# Patient Record
Sex: FEMALE | Race: WHITE | NOT HISPANIC OR LATINO | Employment: OTHER | URBAN - METROPOLITAN AREA
[De-identification: names, ages, dates, MRNs, and addresses within clinical notes are randomized per-mention and may not be internally consistent; named-entity substitution may affect disease eponyms.]

---

## 2019-03-08 ENCOUNTER — OFFICE VISIT (OUTPATIENT)
Dept: URGENT CARE | Facility: CLINIC | Age: 84
End: 2019-03-08
Payer: MEDICARE

## 2019-03-08 VITALS
HEART RATE: 78 BPM | TEMPERATURE: 97 F | OXYGEN SATURATION: 98 % | WEIGHT: 182 LBS | BODY MASS INDEX: 32.25 KG/M2 | HEIGHT: 63 IN | SYSTOLIC BLOOD PRESSURE: 140 MMHG | DIASTOLIC BLOOD PRESSURE: 85 MMHG

## 2019-03-08 DIAGNOSIS — S69.91XA HAND INJURY, RIGHT, INITIAL ENCOUNTER: Primary | ICD-10-CM

## 2019-03-08 PROCEDURE — 99203 OFFICE O/P NEW LOW 30 MIN: CPT | Mod: S$GLB,,, | Performed by: NURSE PRACTITIONER

## 2019-03-08 PROCEDURE — 73130 X-RAY EXAM OF HAND: CPT | Mod: FY,RT,S$GLB, | Performed by: RADIOLOGY

## 2019-03-08 PROCEDURE — 99203 PR OFFICE/OUTPT VISIT, NEW, LEVL III, 30-44 MIN: ICD-10-PCS | Mod: S$GLB,,, | Performed by: NURSE PRACTITIONER

## 2019-03-08 PROCEDURE — 73130 XR HAND COMPLETE 3 VIEW RIGHT: ICD-10-PCS | Mod: FY,RT,S$GLB, | Performed by: RADIOLOGY

## 2019-03-08 NOTE — PATIENT INSTRUCTIONS
Hand Contusion  You have a contusion. This is also called a bruise. There is swelling and some bleeding under the skin, but no broken bones. This injury generally takes a few days to a few weeks to heal.  During that time, the bruise will typically change in color from reddish, to purple-blue, to greenish-yellow, then to yellow-brown.  Home care  · Elevate the hand to reduce pain and swelling. As much as possible, sit or lie down with the hand raised about the level of your heart. This is especially important during the first 48 hours.  · Ice the hand to help reduce pain and swelling. Wrap a cold source (ice pack or ice cubes in a plastic bag) in a thin towel. Apply to the bruised area for 20 minutes every 1 to 2 hours the first day. Continue this 3 to 4 times a day until the pain and swelling goes away.  · Unless another medicine was prescribed, you can take acetaminophen, ibuprofen, or naproxen to control pain. (If you have chronic liver or kidney disease or ever had a stomach ulcer or gastrointestinal bleeding, talk with your doctor before using these medicines.)  Follow up  Follow up with your healthcare provider or our staff as advised. Call if you are not improving within 1 to 2 weeks.  When to seek medical advice   Call your healthcare provider right away if you have any of the following:  · Increased pain or swelling  · Arm becomes cold, blue, numb or tingly  · Signs of infection: Warmth, drainage, or increased redness or pain around the bruise  · Inability to move the injured hand   · Frequent bruising for unknown reasons  Date Last Reviewed: 2/1/2017 © 2000-2017 [employer]. [address] [place] [address], [place] [address]. All rights reserved. This information is not intended as a substitute for professional medical care. Always follow your healthcare professional's instructions.      Closed Hand Fracture (Adult)  You have a fracture, or broken bone, in your hand. This may be a small crack  or chip in the bone. Or it may be a major break with the broken parts pushed out of place. A closed fracture means that the broken bone has not gone through the skin. A hand fracture is treated with a splint or cast. It usually takes 4 to 6 weeks to heal. Severe injuries may require surgery.     Home care  · Keep your arm elevated to reduce pain and swelling. When sitting or lying down, elevate your arm above the level of your heart. You can do this by placing your arm on a pillow that rests on your chest or on a pillow at your side. This is most important during the first 48 hours after injury.  · Apply an ice pack over the injured area for no more than 15 to 20 minutes. Do this every 1 to 2 hours for the first 24 to 48 hours. Continue with ice packs as needed to ease pain and swelling. To make an ice pack, put ice cubes in a plastic bag that seals at the top. Wrap the bag in a clean, thin towel or cloth. Never put ice or an ice pack directly on the skin. You can place the ice pack inside the sling and directly over the cast or splint. As the ice melts, be careful that the cast or splint doesnt get wet.  · Keep the cast or splint completely dry at all times. Bathe with your cast or splint out of the water, protected with 2 large plastic bags. Place 1 bag outside the other. Tape each bag with duct tape at the top end. If a fiberglass cast or splint gets wet, dry it with a hair dryer on a cool setting.  · You may use over-the-counter pain medicine to control pain, unless another pain medicine was prescribed. If you have chronic liver or kidney disease or ever had a stomach ulcer or GI bleeding, talk with your provider beforeusing these medicines.  Follow-up care  Follow up with your healthcare provider within 1 week, or as advised. This is to be sure the bone is healing properly. If you were given a splint, it may be changed to a cast at your follow-up visit.  If X-rays were taken, you will be told of any new  findings that may affect your care.  When to seek medical advice  Call your healthcare provider right away if any of these occur:  · The plaster cast or splint becomes wet or soft  · The fiberglass cast or splint stays wet for more than 24 hours  · The cast has a bad smell  · The plaster cast or splint becomes loose  · There is increased tightness or pain under the cast or splint  · The fingers on your injured hand become swollen, cold, blue, numb, or tingly  Date Last Reviewed: 12/3/2015  © 2355-3365 [employer]. [address] [place] [address]. All rights reserved. This information is not intended as a substitute for professional medical care. Always follow your healthcare professional's instructions.        -Referral to follow up with Orthopedics.  I have given you an [facility] doctor referral. If you don't hear from them in a few days call [phone]  -Wear the splint until your follow up with Hand Surgery.  -Ice to the affected hand.  -Ibuprofen or Tylenol as needed for pain.  Please follow up with your Primary care provider within 2-5 days if your signs and symptoms have not resolved or worsen.     If your condition worsens or fails to improve we recommend that you receive another evaluation at the emergency room immediately or contact your primary medical clinic to discuss your concerns.   You must understand that you have received an Urgent Care treatment only and that you may be released before all of your medical problems are known or treated. You, the patient, will arrange for follow up care as instructed.
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
## 2020-03-12 ENCOUNTER — OFFICE VISIT (OUTPATIENT)
Dept: OPTOMETRY | Facility: CLINIC | Age: 85
End: 2020-03-12
Payer: COMMERCIAL

## 2020-03-12 DIAGNOSIS — H52.13 MYOPIA WITH ASTIGMATISM AND PRESBYOPIA, BILATERAL: Primary | ICD-10-CM

## 2020-03-12 DIAGNOSIS — H52.203 MYOPIA WITH ASTIGMATISM AND PRESBYOPIA, BILATERAL: Primary | ICD-10-CM

## 2020-03-12 DIAGNOSIS — H52.4 MYOPIA WITH ASTIGMATISM AND PRESBYOPIA, BILATERAL: Primary | ICD-10-CM

## 2020-03-12 DIAGNOSIS — Z96.1 PSEUDOPHAKIA: ICD-10-CM

## 2020-03-12 PROCEDURE — 92015 DETERMINE REFRACTIVE STATE: CPT | Mod: S$GLB,,, | Performed by: OPTOMETRIST

## 2020-03-12 PROCEDURE — 99999 PR PBB SHADOW E&M-EST. PATIENT-LVL II: ICD-10-PCS | Mod: PBBFAC,,, | Performed by: OPTOMETRIST

## 2020-03-12 PROCEDURE — 92015 PR REFRACTION: ICD-10-PCS | Mod: S$GLB,,, | Performed by: OPTOMETRIST

## 2020-03-12 PROCEDURE — 92004 PR EYE EXAM, NEW PATIENT,COMPREHESV: ICD-10-PCS | Mod: S$GLB,,, | Performed by: OPTOMETRIST

## 2020-03-12 PROCEDURE — 99999 PR PBB SHADOW E&M-EST. PATIENT-LVL II: CPT | Mod: PBBFAC,,, | Performed by: OPTOMETRIST

## 2020-03-12 PROCEDURE — 92004 COMPRE OPH EXAM NEW PT 1/>: CPT | Mod: S$GLB,,, | Performed by: OPTOMETRIST

## 2020-03-12 RX ORDER — LOSARTAN POTASSIUM 100 MG/1
100 TABLET ORAL DAILY
COMMUNITY
Start: 2020-03-06

## 2020-03-12 RX ORDER — GLYCOPYRROLATE AND FORMOTEROL FUMARATE 9; 4.8 UG/1; UG/1
2 AEROSOL, METERED RESPIRATORY (INHALATION) 2 TIMES DAILY
COMMUNITY
Start: 2020-03-06

## 2020-03-12 NOTE — PROGRESS NOTES
HPI     RANDY:1-2 yrs   Glasses? no  Contacts? no  H/o eye surgery, injections or laser: Cat eval OU   H/o eye injury: no  Known eye conditions? no  Family h/o eye conditions? Mother-cataracts   Eye gtts?no    (-) Flashes (+) Floaters OU occasional, unchanged  (-) Mucous   (-) Tearing (-) Itching (-) Burning   (-) Headaches (-) Eye Pain/discomfort (-) Irritation   (-) Redness (-) Double vision (-) Blurry vision    Diabetic? (-)  A1c?  (No results found for: HGBA1C)        Last edited by Elizabet Flood on 3/12/2020 10:24 AM. (History)            Assessment /Plan     For exam results, see Encounter Report.    Myopia with astigmatism and presbyopia, bilateral    Pseudophakia      1. SRx released to patient. Patient educated on lens options. Normal ocular health. RTC 1 year for routine exam.   2. Good result. Monitor.

## 2020-10-21 ENCOUNTER — TELEPHONE (OUTPATIENT)
Dept: OBSTETRICS AND GYNECOLOGY | Facility: CLINIC | Age: 85
End: 2020-10-21

## 2020-10-21 NOTE — TELEPHONE ENCOUNTER
Pt states she feels something in her vagina like her bladder is falling.  Discomfort improves when sleeping.  She is able to void.  Scheduled tomorrow with Dr. Garcia.

## 2020-10-22 ENCOUNTER — OFFICE VISIT (OUTPATIENT)
Dept: OBSTETRICS AND GYNECOLOGY | Facility: CLINIC | Age: 85
End: 2020-10-22
Attending: OBSTETRICS & GYNECOLOGY
Payer: MEDICARE

## 2020-10-22 VITALS
SYSTOLIC BLOOD PRESSURE: 122 MMHG | BODY MASS INDEX: 29.77 KG/M2 | WEIGHT: 168 LBS | DIASTOLIC BLOOD PRESSURE: 80 MMHG | HEIGHT: 63 IN

## 2020-10-22 DIAGNOSIS — Z12.31 ENCOUNTER FOR SCREENING MAMMOGRAM FOR BREAST CANCER: ICD-10-CM

## 2020-10-22 DIAGNOSIS — R10.2 PELVIC PRESSURE IN FEMALE: Primary | ICD-10-CM

## 2020-10-22 PROCEDURE — 99999 PR PBB SHADOW E&M-EST. PATIENT-LVL III: CPT | Mod: PBBFAC,,, | Performed by: OBSTETRICS & GYNECOLOGY

## 2020-10-22 PROCEDURE — 1159F MED LIST DOCD IN RCRD: CPT | Mod: S$GLB,,, | Performed by: OBSTETRICS & GYNECOLOGY

## 2020-10-22 PROCEDURE — 1126F PR PAIN SEVERITY QUANTIFIED, NO PAIN PRESENT: ICD-10-PCS | Mod: S$GLB,,, | Performed by: OBSTETRICS & GYNECOLOGY

## 2020-10-22 PROCEDURE — 1159F PR MEDICATION LIST DOCUMENTED IN MEDICAL RECORD: ICD-10-PCS | Mod: S$GLB,,, | Performed by: OBSTETRICS & GYNECOLOGY

## 2020-10-22 PROCEDURE — 99203 PR OFFICE/OUTPT VISIT, NEW, LEVL III, 30-44 MIN: ICD-10-PCS | Mod: S$GLB,,, | Performed by: OBSTETRICS & GYNECOLOGY

## 2020-10-22 PROCEDURE — 99999 PR PBB SHADOW E&M-EST. PATIENT-LVL III: ICD-10-PCS | Mod: PBBFAC,,, | Performed by: OBSTETRICS & GYNECOLOGY

## 2020-10-22 PROCEDURE — 99203 OFFICE O/P NEW LOW 30 MIN: CPT | Mod: S$GLB,,, | Performed by: OBSTETRICS & GYNECOLOGY

## 2020-10-22 PROCEDURE — 1126F AMNT PAIN NOTED NONE PRSNT: CPT | Mod: S$GLB,,, | Performed by: OBSTETRICS & GYNECOLOGY

## 2020-10-22 PROCEDURE — 1101F PT FALLS ASSESS-DOCD LE1/YR: CPT | Mod: CPTII,S$GLB,, | Performed by: OBSTETRICS & GYNECOLOGY

## 2020-10-22 PROCEDURE — 1101F PR PT FALLS ASSESS DOC 0-1 FALLS W/OUT INJ PAST YR: ICD-10-PCS | Mod: CPTII,S$GLB,, | Performed by: OBSTETRICS & GYNECOLOGY

## 2020-10-22 RX ORDER — AMLODIPINE BESYLATE 2.5 MG/1
TABLET ORAL
COMMUNITY
Start: 2020-10-01 | End: 2022-07-24

## 2020-10-22 NOTE — PROGRESS NOTES
Subjective:       Patient ID: Carmina Arcos is a 89 y.o. female.    Chief Complaint:  Vaginal Prolapse (Hyst last mammo 2016 birads 2 DIS )      History of Present Illness  - patient presents with c/o feeling something falling between her legs a couple of days ago when she walks; she doesn't feel it now. Has issues with having to strain for BMs. Her last BM was 3 - 4 days ago.     Past Medical History:   Diagnosis Date    Anxiety     Breast cancer     right (lumpectomy and radiation)    Broken bones     Colon cancer     COPD (chronic obstructive pulmonary disease)     Depression     depression (no meds now)    Hypertension        Past Surgical History:   Procedure Laterality Date    APPENDECTOMY      BREAST LUMPECTOMY Right     colon operation       HYSTERECTOMY      Pt states still have ovaries     TONSILLECTOMY           Current Outpatient Medications:     amLODIPine (NORVASC) 2.5 MG tablet, 1 tab Oral daily (standard), Disp: , Rfl:     BEVESPI AEROSPHERE 9-4.8 mcg HFAA, Inhale 2 puffs into the lungs 2 (two) times daily., Disp: , Rfl:     hydrochlorothiazide (HYDRODIURIL) 25 MG tablet, Take 25 mg by mouth once daily., Disp: , Rfl: 4    levothyroxine (SYNTHROID) 50 MCG tablet, Take 50 mcg by mouth once daily., Disp: , Rfl: 4    losartan (COZAAR) 100 MG tablet, , Disp: , Rfl:     Review of patient's allergies indicates:   Allergen Reactions    Penicillins Hives and Shortness Of Breath       GYN & OB History  No LMP recorded. Patient has had a hysterectomy.   Date of Last Pap: No result found    OB History    Para Term  AB Living   4 4 3     4   SAB TAB Ectopic Multiple Live Births           4      # Outcome Date GA Lbr Jason/2nd Weight Sex Delivery Anes PTL Lv   4 Term 60 37w0d  3.204 kg (7 lb 1 oz) F Vag-Spont  N JOSE   3 Term 57 40w0d  3.43 kg (7 lb 9 oz) M Vag-Spont  N JOSE   2 Term 54 37w0d  3.43 kg (7 lb 9 oz) F Vag-Spont  N JOSE   1 Para 50   3.742  "kg (8 lb 4 oz) M Vag-Spont  N JOSE       Social History     Socioeconomic History    Marital status:      Spouse name: Not on file    Number of children: Not on file    Years of education: Not on file    Highest education level: Not on file   Occupational History    Not on file   Social Needs    Financial resource strain: Not on file    Food insecurity     Worry: Not on file     Inability: Not on file    Transportation needs     Medical: Not on file     Non-medical: Not on file   Tobacco Use    Smoking status: Never Smoker    Smokeless tobacco: Never Used   Substance and Sexual Activity    Alcohol use: No    Drug use: No    Sexual activity: Never   Lifestyle    Physical activity     Days per week: Not on file     Minutes per session: Not on file    Stress: Not on file   Relationships    Social connections     Talks on phone: Not on file     Gets together: Not on file     Attends Islam service: Not on file     Active member of club or organization: Not on file     Attends meetings of clubs or organizations: Not on file     Relationship status: Not on file   Other Topics Concern    Not on file   Social History Narrative    Not on file       Family History   Problem Relation Age of Onset    Breast cancer Neg Hx     Colon cancer Neg Hx     Ovarian cancer Neg Hx        Review of Systems  Review of Systems   All other systems reviewed and are negative.       Objective:     Vitals:    10/22/20 0840   BP: 122/80   Weight: 76.2 kg (167 lb 15.9 oz)   Height: 5' 3" (1.6 m)       Physical Exam:   Constitutional: She appears well-developed and well-nourished. She is cooperative. No distress.             Abdominal: Soft. Normal appearance. There is no abdominal tenderness.     Genitourinary: There is no rash, tenderness or lesion on the right labia. There is no rash, tenderness or lesion on the left labia. Uterus is absent. Right adnexum displays no mass, no tenderness and no fullness. Left adnexum " displays no mass, no tenderness and no fullness. There is rectocele and cystocele in the vagina. Vaginal cuff normal.Cervix exhibits absence.    Genitourinary Comments: Minimal cystorectocele. Cuff well supported.                 Neurological: She is alert.          Assessment/ Plan:     Orders Placed This Encounter    Mammo Digital Screening Bilat w/ Talat       Carmina was seen today for vaginal prolapse.    Diagnoses and all orders for this visit:    Pelvic pressure in female    Encounter for screening mammogram for breast cancer  -     Mammo Digital Screening Bilat w/ Talat; Future  -     Mammo Digital Screening Bilat w/ Talat    - discussed how her constipation and straining caused the issue. Will take Miralax today and may need to take it for several days until having regular BMs. Advised her to increase po hydration and take Colace daily. She'll keep me posted.    Follow up for annual exam.

## 2021-03-11 ENCOUNTER — OFFICE VISIT (OUTPATIENT)
Dept: OPTOMETRY | Facility: CLINIC | Age: 86
End: 2021-03-11
Payer: COMMERCIAL

## 2021-03-11 DIAGNOSIS — H52.203 MYOPIA WITH ASTIGMATISM AND PRESBYOPIA, BILATERAL: Primary | ICD-10-CM

## 2021-03-11 DIAGNOSIS — H02.831 DERMATOCHALASIS OF BOTH UPPER EYELIDS: ICD-10-CM

## 2021-03-11 DIAGNOSIS — H52.13 MYOPIA WITH ASTIGMATISM AND PRESBYOPIA, BILATERAL: Primary | ICD-10-CM

## 2021-03-11 DIAGNOSIS — H02.834 DERMATOCHALASIS OF BOTH UPPER EYELIDS: ICD-10-CM

## 2021-03-11 DIAGNOSIS — H52.4 MYOPIA WITH ASTIGMATISM AND PRESBYOPIA, BILATERAL: Primary | ICD-10-CM

## 2021-03-11 DIAGNOSIS — H04.123 DRY EYE SYNDROME OF BOTH EYES: ICD-10-CM

## 2021-03-11 DIAGNOSIS — Z96.1 PSEUDOPHAKIA: ICD-10-CM

## 2021-03-11 PROCEDURE — 92014 COMPRE OPH EXAM EST PT 1/>: CPT | Mod: S$GLB,,, | Performed by: OPTOMETRIST

## 2021-03-11 PROCEDURE — 92014 PR EYE EXAM, EST PATIENT,COMPREHESV: ICD-10-PCS | Mod: S$GLB,,, | Performed by: OPTOMETRIST

## 2021-03-11 PROCEDURE — 92015 PR REFRACTION: ICD-10-PCS | Mod: S$GLB,,, | Performed by: OPTOMETRIST

## 2021-03-11 PROCEDURE — 99999 PR PBB SHADOW E&M-EST. PATIENT-LVL II: CPT | Mod: PBBFAC,,, | Performed by: OPTOMETRIST

## 2021-03-11 PROCEDURE — 99999 PR PBB SHADOW E&M-EST. PATIENT-LVL II: ICD-10-PCS | Mod: PBBFAC,,, | Performed by: OPTOMETRIST

## 2021-03-11 PROCEDURE — 92015 DETERMINE REFRACTIVE STATE: CPT | Mod: S$GLB,,, | Performed by: OPTOMETRIST

## 2021-06-24 ENCOUNTER — OFFICE VISIT (OUTPATIENT)
Dept: OTOLARYNGOLOGY | Facility: CLINIC | Age: 86
End: 2021-06-24
Payer: MEDICARE

## 2021-06-24 VITALS
HEART RATE: 83 BPM | DIASTOLIC BLOOD PRESSURE: 72 MMHG | TEMPERATURE: 97 F | WEIGHT: 175.25 LBS | BODY MASS INDEX: 31.05 KG/M2 | SYSTOLIC BLOOD PRESSURE: 134 MMHG

## 2021-06-24 DIAGNOSIS — J30.9 ALLERGIC RHINITIS, UNSPECIFIED SEASONALITY, UNSPECIFIED TRIGGER: ICD-10-CM

## 2021-06-24 DIAGNOSIS — R13.10 DYSPHAGIA, UNSPECIFIED TYPE: ICD-10-CM

## 2021-06-24 DIAGNOSIS — K21.9 LARYNGOPHARYNGEAL REFLUX (LPR): Primary | ICD-10-CM

## 2021-06-24 DIAGNOSIS — H93.299 ABNORMAL AUDITORY PERCEPTION, UNSPECIFIED LATERALITY: ICD-10-CM

## 2021-06-24 DIAGNOSIS — R09.89 CHOKING IN ADULT: ICD-10-CM

## 2021-06-24 DIAGNOSIS — J34.2 NASAL SEPTAL DEVIATION: ICD-10-CM

## 2021-06-24 PROCEDURE — 1126F AMNT PAIN NOTED NONE PRSNT: CPT | Mod: S$GLB,,, | Performed by: SPECIALIST

## 2021-06-24 PROCEDURE — 1101F PT FALLS ASSESS-DOCD LE1/YR: CPT | Mod: CPTII,S$GLB,, | Performed by: SPECIALIST

## 2021-06-24 PROCEDURE — 99999 PR PBB SHADOW E&M-EST. PATIENT-LVL III: ICD-10-PCS | Mod: PBBFAC,,, | Performed by: SPECIALIST

## 2021-06-24 PROCEDURE — 3288F FALL RISK ASSESSMENT DOCD: CPT | Mod: CPTII,S$GLB,, | Performed by: SPECIALIST

## 2021-06-24 PROCEDURE — 1101F PR PT FALLS ASSESS DOC 0-1 FALLS W/OUT INJ PAST YR: ICD-10-PCS | Mod: CPTII,S$GLB,, | Performed by: SPECIALIST

## 2021-06-24 PROCEDURE — 99204 OFFICE O/P NEW MOD 45 MIN: CPT | Mod: 25,S$GLB,, | Performed by: SPECIALIST

## 2021-06-24 PROCEDURE — 1126F PR PAIN SEVERITY QUANTIFIED, NO PAIN PRESENT: ICD-10-PCS | Mod: S$GLB,,, | Performed by: SPECIALIST

## 2021-06-24 PROCEDURE — 1159F MED LIST DOCD IN RCRD: CPT | Mod: S$GLB,,, | Performed by: SPECIALIST

## 2021-06-24 PROCEDURE — 31575 LARYNGOSCOPY: ICD-10-PCS | Mod: S$GLB,,, | Performed by: SPECIALIST

## 2021-06-24 PROCEDURE — 31575 DIAGNOSTIC LARYNGOSCOPY: CPT | Mod: S$GLB,,, | Performed by: SPECIALIST

## 2021-06-24 PROCEDURE — 3288F PR FALLS RISK ASSESSMENT DOCUMENTED: ICD-10-PCS | Mod: CPTII,S$GLB,, | Performed by: SPECIALIST

## 2021-06-24 PROCEDURE — 99999 PR PBB SHADOW E&M-EST. PATIENT-LVL III: CPT | Mod: PBBFAC,,, | Performed by: SPECIALIST

## 2021-06-24 PROCEDURE — 1159F PR MEDICATION LIST DOCUMENTED IN MEDICAL RECORD: ICD-10-PCS | Mod: S$GLB,,, | Performed by: SPECIALIST

## 2021-06-24 PROCEDURE — 99204 PR OFFICE/OUTPT VISIT, NEW, LEVL IV, 45-59 MIN: ICD-10-PCS | Mod: 25,S$GLB,, | Performed by: SPECIALIST

## 2021-06-24 RX ORDER — FAMOTIDINE 20 MG/1
20 TABLET, FILM COATED ORAL
Status: DISCONTINUED | OUTPATIENT
Start: 2021-06-24 | End: 2022-07-25

## 2021-06-25 ENCOUNTER — TELEPHONE (OUTPATIENT)
Dept: AUDIOLOGY | Facility: CLINIC | Age: 86
End: 2021-06-25

## 2021-06-30 ENCOUNTER — CLINICAL SUPPORT (OUTPATIENT)
Dept: AUDIOLOGY | Facility: CLINIC | Age: 86
End: 2021-06-30
Payer: MEDICARE

## 2021-06-30 DIAGNOSIS — H93.293 ABNORMAL AUDITORY PERCEPTION OF BOTH EARS: ICD-10-CM

## 2021-06-30 DIAGNOSIS — H90.3 SENSORINEURAL HEARING LOSS (SNHL), BILATERAL: Primary | ICD-10-CM

## 2021-06-30 PROCEDURE — 92557 PR COMPREHENSIVE HEARING TEST: ICD-10-PCS | Mod: S$GLB,,, | Performed by: AUDIOLOGIST

## 2021-06-30 PROCEDURE — 92557 COMPREHENSIVE HEARING TEST: CPT | Mod: S$GLB,,, | Performed by: AUDIOLOGIST

## 2021-08-12 ENCOUNTER — HOSPITAL ENCOUNTER (OUTPATIENT)
Dept: RADIOLOGY | Facility: HOSPITAL | Age: 86
Discharge: HOME | End: 2021-08-12
Attending: SPECIALIST
Payer: MEDICARE

## 2021-08-12 DIAGNOSIS — K21.9 LARYNGOPHARYNGEAL REFLUX (LPR): ICD-10-CM

## 2021-08-12 DIAGNOSIS — R13.10 DYSPHAGIA, UNSPECIFIED TYPE: ICD-10-CM

## 2021-08-12 DIAGNOSIS — R09.89 CHOKING IN ADULT: ICD-10-CM

## 2021-08-12 PROCEDURE — A9698 NON-RAD CONTRAST MATERIALNOC: HCPCS | Performed by: SPECIALIST

## 2021-08-12 PROCEDURE — 74220 FL ESOPHAGRAM COMPLETE: ICD-10-PCS | Mod: 26,,, | Performed by: RADIOLOGY

## 2021-08-12 PROCEDURE — 74220 X-RAY XM ESOPHAGUS 1CNTRST: CPT | Mod: TC

## 2021-08-12 PROCEDURE — 25500020 PHARM REV CODE 255: Performed by: SPECIALIST

## 2021-08-12 PROCEDURE — 74220 X-RAY XM ESOPHAGUS 1CNTRST: CPT | Mod: 26,,, | Performed by: RADIOLOGY

## 2021-08-12 RX ADMIN — BARIUM SULFATE 170 ML: 0.6 SUSPENSION ORAL at 01:08

## 2022-01-12 ENCOUNTER — TELEPHONE (OUTPATIENT)
Dept: OTOLARYNGOLOGY | Facility: CLINIC | Age: 87
End: 2022-01-12
Payer: MEDICARE

## 2022-01-12 NOTE — TELEPHONE ENCOUNTER
----- Message from Sylvia Bateman sent at 1/12/2022 11:08 AM CST -----  Regarding: Test results  Contact: [phone]  Patient Marion calling in ref to her test results from her ESOPHAGRAM done 8/12/21. Patient would like to know if the doctor would call her with the results she doesn't want to come into the clinic due to the Covid concern.. Please call patient at [phone]      Thank You and Have a great day  Called and spoke with patient today letting her know per Dr. Hardy states Please inform patient that t her barium swallow shows some mild abnormalities .  She needs to schedule a follow-up appointment within the next 2 weeks to discuss these findings and a general follow-up for her last visit.  I will discuss with them at their next visit. Nothing significant to worry about.    LP

## 2022-02-04 ENCOUNTER — OFFICE VISIT (OUTPATIENT)
Dept: OTOLARYNGOLOGY | Facility: CLINIC | Age: 87
End: 2022-02-04
Payer: MEDICARE

## 2022-02-04 VITALS
SYSTOLIC BLOOD PRESSURE: 149 MMHG | TEMPERATURE: 97 F | WEIGHT: 170.88 LBS | BODY MASS INDEX: 30.27 KG/M2 | DIASTOLIC BLOOD PRESSURE: 75 MMHG | HEART RATE: 92 BPM

## 2022-02-04 DIAGNOSIS — R13.10 DYSPHAGIA, UNSPECIFIED TYPE: Primary | ICD-10-CM

## 2022-02-04 DIAGNOSIS — R09.89 CHOKING IN ADULT: ICD-10-CM

## 2022-02-04 DIAGNOSIS — J30.9 ALLERGIC RHINITIS, UNSPECIFIED SEASONALITY, UNSPECIFIED TRIGGER: ICD-10-CM

## 2022-02-04 DIAGNOSIS — H90.3 SENSORINEURAL HEARING LOSS (SNHL) OF BOTH EARS: ICD-10-CM

## 2022-02-04 DIAGNOSIS — J34.2 NASAL SEPTAL DEVIATION: ICD-10-CM

## 2022-02-04 PROCEDURE — 1159F MED LIST DOCD IN RCRD: CPT | Mod: CPTII,S$GLB,, | Performed by: SPECIALIST

## 2022-02-04 PROCEDURE — 99214 PR OFFICE/OUTPT VISIT, EST, LEVL IV, 30-39 MIN: ICD-10-PCS | Mod: S$GLB,,, | Performed by: SPECIALIST

## 2022-02-04 PROCEDURE — 1126F AMNT PAIN NOTED NONE PRSNT: CPT | Mod: CPTII,S$GLB,, | Performed by: SPECIALIST

## 2022-02-04 PROCEDURE — 1101F PT FALLS ASSESS-DOCD LE1/YR: CPT | Mod: CPTII,S$GLB,, | Performed by: SPECIALIST

## 2022-02-04 PROCEDURE — 1159F PR MEDICATION LIST DOCUMENTED IN MEDICAL RECORD: ICD-10-PCS | Mod: CPTII,S$GLB,, | Performed by: SPECIALIST

## 2022-02-04 PROCEDURE — 3288F FALL RISK ASSESSMENT DOCD: CPT | Mod: CPTII,S$GLB,, | Performed by: SPECIALIST

## 2022-02-04 PROCEDURE — 3288F PR FALLS RISK ASSESSMENT DOCUMENTED: ICD-10-PCS | Mod: CPTII,S$GLB,, | Performed by: SPECIALIST

## 2022-02-04 PROCEDURE — 1160F PR REVIEW ALL MEDS BY PRESCRIBER/CLIN PHARMACIST DOCUMENTED: ICD-10-PCS | Mod: CPTII,S$GLB,, | Performed by: SPECIALIST

## 2022-02-04 PROCEDURE — 99999 PR PBB SHADOW E&M-EST. PATIENT-LVL III: ICD-10-PCS | Mod: PBBFAC,,, | Performed by: SPECIALIST

## 2022-02-04 PROCEDURE — 1101F PR PT FALLS ASSESS DOC 0-1 FALLS W/OUT INJ PAST YR: ICD-10-PCS | Mod: CPTII,S$GLB,, | Performed by: SPECIALIST

## 2022-02-04 PROCEDURE — 99214 OFFICE O/P EST MOD 30 MIN: CPT | Mod: S$GLB,,, | Performed by: SPECIALIST

## 2022-02-04 PROCEDURE — 1160F RVW MEDS BY RX/DR IN RCRD: CPT | Mod: CPTII,S$GLB,, | Performed by: SPECIALIST

## 2022-02-04 PROCEDURE — 1126F PR PAIN SEVERITY QUANTIFIED, NO PAIN PRESENT: ICD-10-PCS | Mod: CPTII,S$GLB,, | Performed by: SPECIALIST

## 2022-02-04 PROCEDURE — 99999 PR PBB SHADOW E&M-EST. PATIENT-LVL III: CPT | Mod: PBBFAC,,, | Performed by: SPECIALIST

## 2022-02-04 NOTE — PROGRESS NOTES
Subjective:       Patient ID: Carmina Arcos is a 89 y.o. female.    Chief Complaint: Follow-up (With results)    Follow-up    The patient is returning to discuss results from diagnostic test done after her last visit.  There are multiple issues to discuss:  1. Dysphagia/swallowing:  She has had no more choking spells since starting on the famotidine.  She  does have intermittent dysphagia.  2. Hearing:  Patient states that her hearing is not so good.  She is not having ringing in the ears.      Review of Systems     Constitutional: Positive for fatigue.  Negative for appetite change and unexpected weight loss.      HENT: Positive for postnasal drip, runny nose, sinus pressure, stuffy nose, trouble swallowing (Dysphagia and choking) and voice change.  Negative for ear discharge, ear infection, ear pain, facial swelling, hearing loss, mouth sores, nosebleeds, ringing in the ears, sinus infection, tonsil infection and dental problems.      Eyes:  Negative for change in eyesight, eye drainage, eye itching and photophobia.     Respiratory:  Negative for shortness of breath, sleep apnea, snoring and wheezing.      Cardiovascular:  Negative for foot swelling, irregular heartbeat and swollen veins.     Gastrointestinal:  Positive for acid reflux and heartburn. Negative for constipation and diarrhea.     Genitourinary: Negative for difficulty urinating, sexual problems and frequent urination.     Musc: Positive for aching joints and back pain. Negative for aching muscles.     Allergy: Positive for seasonal allergies. Negative for food allergies.     Endocrine: Negative for cold intolerance and heat intolerance.      Neurological: Negative for dizziness, light-headedness, seizures and tremors.      Hematologic: Negative for bruises/bleeds easily.      Psychiatric: Negative for decreased concentration, depression, nervous/anxious and sleep disturbance.                Objective:      Physical Exam  Vitals and nursing note  reviewed. Chaperone present: The patient's son accompanies her to the office.   Constitutional:       General: She is awake.      Appearance: Normal appearance. She is well-developed, well-groomed and normal weight.   HENT:      Head: Normocephalic.      Jaw: There is normal jaw occlusion.      Salivary Glands: Right salivary gland is not diffusely enlarged. Left salivary gland is not diffusely enlarged.      Right Ear: Ear canal and external ear normal. Decreased hearing noted. Tympanic membrane is retracted.      Left Ear: Ear canal and external ear normal. Decreased hearing noted. Tympanic membrane is retracted.      Nose: Septal deviation (To the left), mucosal edema (cyanotic, boggy inferior turbinates bilaterally) and rhinorrhea (clear mucus bilaterally) present. No nasal deformity. Rhinorrhea is clear.      Right Turbinates: Enlarged and pale.      Left Turbinates: Enlarged and pale.      Mouth/Throat:      Lips: No lesions.      Mouth: No oral lesions.      Dentition: Abnormal dentition. Has dentures ( partial upper denture). No gum lesions.      Tongue: No lesions.      Palate: No mass and lesions.      Pharynx: Oropharynx is clear. Uvula midline.   Eyes:      General: Lids are normal.         Right eye: No discharge.         Left eye: No discharge.      Conjunctiva/sclera:      Right eye: Right conjunctiva is injected. No exudate.     Left eye: Left conjunctiva is injected. No exudate.     Pupils: Pupils are equal, round, and reactive to light.   Neck:      Thyroid: No thyroid mass or thyromegaly.      Trachea: Trachea normal. No tracheal deviation.   Cardiovascular:      Rate and Rhythm: Normal rate and regular rhythm.      Pulses: Normal pulses.      Heart sounds: Normal heart sounds.   Pulmonary:      Effort: Pulmonary effort is normal.      Breath sounds: Normal breath sounds. No stridor. No decreased breath sounds, wheezing, rhonchi or rales.   Abdominal:      General: Bowel sounds are normal.       Palpations: Abdomen is soft.      Tenderness: There is no abdominal tenderness.   Musculoskeletal:      Cervical back: No muscular tenderness. Decreased range of motion.   Lymphadenopathy:      Head:      Right side of head: No submental, submandibular, preauricular, posterior auricular or occipital adenopathy.      Left side of head: No submental, submandibular, preauricular, posterior auricular or occipital adenopathy.      Cervical: No cervical adenopathy.   Skin:     General: Skin is warm and dry.      Findings: No petechiae or rash.      Nails: There is no clubbing.   Neurological:      Mental Status: She is alert and oriented to person, place, and time.      Cranial Nerves: No cranial nerve deficit.      Sensory: No sensory deficit.      Gait: Gait normal.   Psychiatric:         Speech: Speech normal.         Behavior: Behavior normal. Behavior is cooperative.         Thought Content: Thought content normal.         Judgment: Judgment normal.         Barium esophagram-esophageal dysmotility, prominent cricopharyngeal bar and small hiatal hernia, no strictures, obstruction or tumors noted      I personally discussed the results of the above audiogram and barium swallow studies with the patient in full detail and answered all of her questions during the visit.  Provided her with a copy of the audiogram.    Assessment:       1. Dysphagia, unspecified type    2. Choking in adult    3. Allergic rhinitis, unspecified seasonality, unspecified trigger    4. Nasal septal deviation    5. Sensorineural hearing loss (SNHL) of both ears        Plan:       I  will have the patient continue her present medications.  I will recheck her in 6 months.  I am recommending that she use bilateral amplification/hearing aids.  She will check with her insurance carrier further preferred provider.  I did write a note of medical clearance on the bottom of her audiogram.  She is medically cleared for bilateral amplification.

## 2022-06-20 ENCOUNTER — OFFICE VISIT (OUTPATIENT)
Dept: URGENT CARE | Facility: CLINIC | Age: 87
End: 2022-06-20
Payer: MEDICARE

## 2022-06-20 VITALS
TEMPERATURE: 98 F | HEIGHT: 63 IN | RESPIRATION RATE: 18 BRPM | WEIGHT: 170 LBS | SYSTOLIC BLOOD PRESSURE: 123 MMHG | OXYGEN SATURATION: 92 % | DIASTOLIC BLOOD PRESSURE: 76 MMHG | HEART RATE: 77 BPM | BODY MASS INDEX: 30.12 KG/M2

## 2022-06-20 DIAGNOSIS — S81.811A LACERATION OF RIGHT LOWER EXTREMITY, INITIAL ENCOUNTER: Primary | ICD-10-CM

## 2022-06-20 PROCEDURE — 99203 OFFICE O/P NEW LOW 30 MIN: CPT | Mod: 25,S$GLB,, | Performed by: NURSE PRACTITIONER

## 2022-06-20 PROCEDURE — 90471 IMMUNIZATION ADMIN: CPT | Mod: S$GLB,,, | Performed by: NURSE PRACTITIONER

## 2022-06-20 PROCEDURE — 1159F PR MEDICATION LIST DOCUMENTED IN MEDICAL RECORD: ICD-10-PCS | Mod: CPTII,S$GLB,, | Performed by: NURSE PRACTITIONER

## 2022-06-20 PROCEDURE — 90471 TDAP VACCINE GREATER THAN OR EQUAL TO 7YO IM: ICD-10-PCS | Mod: S$GLB,,, | Performed by: NURSE PRACTITIONER

## 2022-06-20 PROCEDURE — 12031 LACERATION REPAIR: ICD-10-PCS | Mod: S$GLB,,, | Performed by: NURSE PRACTITIONER

## 2022-06-20 PROCEDURE — 1160F PR REVIEW ALL MEDS BY PRESCRIBER/CLIN PHARMACIST DOCUMENTED: ICD-10-PCS | Mod: CPTII,S$GLB,, | Performed by: NURSE PRACTITIONER

## 2022-06-20 PROCEDURE — 12031 INTMD RPR S/A/T/EXT 2.5 CM/<: CPT | Mod: S$GLB,,, | Performed by: NURSE PRACTITIONER

## 2022-06-20 PROCEDURE — 90715 TDAP VACCINE GREATER THAN OR EQUAL TO 7YO IM: ICD-10-PCS | Mod: S$GLB,,, | Performed by: NURSE PRACTITIONER

## 2022-06-20 PROCEDURE — 1159F MED LIST DOCD IN RCRD: CPT | Mod: CPTII,S$GLB,, | Performed by: NURSE PRACTITIONER

## 2022-06-20 PROCEDURE — 99203 PR OFFICE/OUTPT VISIT, NEW, LEVL III, 30-44 MIN: ICD-10-PCS | Mod: 25,S$GLB,, | Performed by: NURSE PRACTITIONER

## 2022-06-20 PROCEDURE — 90715 TDAP VACCINE 7 YRS/> IM: CPT | Mod: S$GLB,,, | Performed by: NURSE PRACTITIONER

## 2022-06-20 PROCEDURE — 1160F RVW MEDS BY RX/DR IN RCRD: CPT | Mod: CPTII,S$GLB,, | Performed by: NURSE PRACTITIONER

## 2022-06-21 NOTE — PROCEDURES
"Laceration Repair    Date/Time: 6/20/2022 5:45 PM  Performed by: Renzo Heard NP  Authorized by: Renzo Heard NP   Consent Done: Yes  Consent: Verbal consent obtained. Written consent not obtained.  Consent given by: patient  Patient understanding: patient states understanding of the procedure being performed  Patient identity confirmed: verbally with patient and name  Time out: Immediately prior to procedure a "time out" was called to verify the correct patient, procedure, equipment, support staff and site/side marked as required.  Body area: lower extremity  Location details: right lower leg  Laceration length: 2.5 cm  Foreign bodies: no foreign bodies  Tendon involvement: none  Nerve involvement: none  Vascular damage: no  Anesthesia: see MAR for details    Patient sedated: no  Irrigation solution: saline  Irrigation method: syringe  Amount of cleaning: extensive  Debridement: none  Degree of undermining: none  Skin closure: glue  Approximation: close  Dressing: open (no dressing)  Patient tolerance: Patient tolerated the procedure well with no immediate complications        "

## 2022-07-19 ENCOUNTER — TELEPHONE (OUTPATIENT)
Dept: PULMONOLOGY | Facility: CLINIC | Age: 87
End: 2022-07-19
Payer: MEDICARE

## 2022-07-19 DIAGNOSIS — J44.9 CHRONIC OBSTRUCTIVE PULMONARY DISEASE, UNSPECIFIED COPD TYPE: Primary | ICD-10-CM

## 2022-07-22 ENCOUNTER — HOSPITAL ENCOUNTER (EMERGENCY)
Facility: HOSPITAL | Age: 87
Discharge: HOME | End: 2022-07-22
Attending: EMERGENCY MEDICINE
Payer: MEDICARE

## 2022-07-22 VITALS
WEIGHT: 177 LBS | DIASTOLIC BLOOD PRESSURE: 70 MMHG | TEMPERATURE: 99 F | HEART RATE: 90 BPM | OXYGEN SATURATION: 92 % | RESPIRATION RATE: 20 BRPM | SYSTOLIC BLOOD PRESSURE: 142 MMHG | BODY MASS INDEX: 31.36 KG/M2 | HEIGHT: 63 IN

## 2022-07-22 DIAGNOSIS — F30.10 MANIC BEHAVIOR: Primary | ICD-10-CM

## 2022-07-22 LAB
ALBUMIN SERPL BCP-MCNC: 3.4 G/DL (ref 3.5–5.2)
ALP SERPL-CCNC: 96 U/L (ref 55–135)
ALT SERPL W/O P-5'-P-CCNC: 11 U/L (ref 10–44)
AMPHET+METHAMPHET UR QL: NEGATIVE
ANION GAP SERPL CALC-SCNC: 12 MMOL/L (ref 8–16)
APAP SERPL-MCNC: <3 UG/ML (ref 10–20)
AST SERPL-CCNC: 14 U/L (ref 10–40)
BARBITURATES UR QL SCN>200 NG/ML: NEGATIVE
BASOPHILS # BLD AUTO: 0.05 K/UL (ref 0–0.2)
BASOPHILS NFR BLD: 0.6 % (ref 0–1.9)
BENZODIAZ UR QL SCN>200 NG/ML: NEGATIVE
BILIRUB SERPL-MCNC: 0.6 MG/DL (ref 0.1–1)
BILIRUB UR QL STRIP: NEGATIVE
BUN SERPL-MCNC: 13 MG/DL (ref 10–30)
BZE UR QL SCN: NEGATIVE
CALCIUM SERPL-MCNC: 9.6 MG/DL (ref 8.7–10.5)
CANNABINOIDS UR QL SCN: NEGATIVE
CHLORIDE SERPL-SCNC: 97 MMOL/L (ref 95–110)
CLARITY UR REFRACT.AUTO: CLEAR
CO2 SERPL-SCNC: 26 MMOL/L (ref 23–29)
COLOR UR AUTO: YELLOW
CREAT SERPL-MCNC: 0.7 MG/DL (ref 0.5–1.4)
CREAT UR-MCNC: 52 MG/DL (ref 15–325)
DIFFERENTIAL METHOD: ABNORMAL
EOSINOPHIL # BLD AUTO: 0.2 K/UL (ref 0–0.5)
EOSINOPHIL NFR BLD: 2.6 % (ref 0–8)
ERYTHROCYTE [DISTWIDTH] IN BLOOD BY AUTOMATED COUNT: 13.2 % (ref 11.5–14.5)
EST. GFR  (AFRICAN AMERICAN): >60 ML/MIN/1.73 M^2
EST. GFR  (NON AFRICAN AMERICAN): >60 ML/MIN/1.73 M^2
ETHANOL SERPL-MCNC: <10 MG/DL
GLUCOSE SERPL-MCNC: 114 MG/DL (ref 70–110)
GLUCOSE UR QL STRIP: NEGATIVE
HCT VFR BLD AUTO: 39.8 % (ref 37–48.5)
HGB BLD-MCNC: 12.9 G/DL (ref 12–16)
HGB UR QL STRIP: NEGATIVE
IMM GRANULOCYTES # BLD AUTO: 0.03 K/UL (ref 0–0.04)
IMM GRANULOCYTES NFR BLD AUTO: 0.4 % (ref 0–0.5)
KETONES UR QL STRIP: NEGATIVE
LEUKOCYTE ESTERASE UR QL STRIP: NEGATIVE
LYMPHOCYTES # BLD AUTO: 1.3 K/UL (ref 1–4.8)
LYMPHOCYTES NFR BLD: 15.9 % (ref 18–48)
MCH RBC QN AUTO: 28.4 PG (ref 27–31)
MCHC RBC AUTO-ENTMCNC: 32.4 G/DL (ref 32–36)
MCV RBC AUTO: 88 FL (ref 82–98)
METHADONE UR QL SCN>300 NG/ML: NEGATIVE
MONOCYTES # BLD AUTO: 0.7 K/UL (ref 0.3–1)
MONOCYTES NFR BLD: 8.3 % (ref 4–15)
NEUTROPHILS # BLD AUTO: 5.8 K/UL (ref 1.8–7.7)
NEUTROPHILS NFR BLD: 72.2 % (ref 38–73)
NITRITE UR QL STRIP: NEGATIVE
NRBC BLD-RTO: 0 /100 WBC
OPIATES UR QL SCN: NEGATIVE
PCP UR QL SCN>25 NG/ML: NEGATIVE
PH UR STRIP: 6 [PH] (ref 5–8)
PLATELET # BLD AUTO: 235 K/UL (ref 150–450)
PMV BLD AUTO: 10.2 FL (ref 9.2–12.9)
POTASSIUM SERPL-SCNC: 3.7 MMOL/L (ref 3.5–5.1)
PROT SERPL-MCNC: 6.9 G/DL (ref 6–8.4)
PROT UR QL STRIP: NEGATIVE
RBC # BLD AUTO: 4.54 M/UL (ref 4–5.4)
SARS-COV-2 RDRP RESP QL NAA+PROBE: NEGATIVE
SODIUM SERPL-SCNC: 135 MMOL/L (ref 136–145)
SP GR UR STRIP: 1.01 (ref 1–1.03)
TOXICOLOGY INFORMATION: NORMAL
TSH SERPL DL<=0.005 MIU/L-ACNC: 1.77 UIU/ML (ref 0.4–4)
URN SPEC COLLECT METH UR: NORMAL
WBC # BLD AUTO: 7.98 K/UL (ref 3.9–12.7)

## 2022-07-22 PROCEDURE — 80307 DRUG TEST PRSMV CHEM ANLYZR: CPT | Performed by: EMERGENCY MEDICINE

## 2022-07-22 PROCEDURE — U0002 COVID-19 LAB TEST NON-CDC: HCPCS | Performed by: EMERGENCY MEDICINE

## 2022-07-22 PROCEDURE — 85025 COMPLETE CBC W/AUTO DIFF WBC: CPT | Performed by: EMERGENCY MEDICINE

## 2022-07-22 PROCEDURE — 84443 ASSAY THYROID STIM HORMONE: CPT | Performed by: EMERGENCY MEDICINE

## 2022-07-22 PROCEDURE — 80143 DRUG ASSAY ACETAMINOPHEN: CPT | Performed by: EMERGENCY MEDICINE

## 2022-07-22 PROCEDURE — 99284 EMERGENCY DEPT VISIT MOD MDM: CPT | Mod: 25

## 2022-07-22 PROCEDURE — 81003 URINALYSIS AUTO W/O SCOPE: CPT | Mod: 59 | Performed by: EMERGENCY MEDICINE

## 2022-07-22 PROCEDURE — 82077 ASSAY SPEC XCP UR&BREATH IA: CPT | Performed by: EMERGENCY MEDICINE

## 2022-07-22 PROCEDURE — 80053 COMPREHEN METABOLIC PANEL: CPT | Performed by: EMERGENCY MEDICINE

## 2022-07-22 PROCEDURE — 99285 EMERGENCY DEPT VISIT HI MDM: CPT | Mod: CS,,, | Performed by: EMERGENCY MEDICINE

## 2022-07-22 PROCEDURE — 99285 PR EMERGENCY DEPT VISIT,LEVEL V: ICD-10-PCS | Mod: CS,,, | Performed by: EMERGENCY MEDICINE

## 2022-07-22 RX ORDER — FAMOTIDINE 10 MG/1
10 TABLET ORAL 2 TIMES DAILY
COMMUNITY

## 2022-07-22 NOTE — DISCHARGE INSTRUCTIONS
As we discussed, there is no emergent indication for psychiatric hospitalization today.  Should Ms. Arcos worsen in any way, she should return to the ER.  Be sure to follow-up with her psychiatry appointment next week.

## 2022-07-22 NOTE — ED NOTES
Pt lying on the stretcher resting, she endorses poor sleep for 3 days. Her speech is rambling and hyper verbal. Pt denies SI/HI/AVH's. Pt does follow instructions and is cooperative.

## 2022-07-22 NOTE — FIRST PROVIDER EVALUATION
Medical screening exam completed.  I have conducted a focused provider triage encounter, findings are as follows:    Brief history of present illness:  Zac in my her son with concern about her mental health.  She has suspected schizphrenia and has had volitile emotions l;ately.  She lives alone.  Not suicidal.    There were no vitals filed for this visit.    Pertinent physical exam:  Alert and conversant    Brief workup plan:  Will order psych order set.  WIll hold PEC for now.      Preliminary workup initiated; this workup will be continued and followed by the physician or advanced practice provider that is assigned to the patient when roomed.

## 2022-07-22 NOTE — ED PROVIDER NOTES
"Encounter Date: 7/22/2022       History     Chief Complaint   Patient presents with    Psychiatric Evaluation     Pt brought in by son for psych evaluation.  States pt has been volatile and confabulating.  Denies SI/HI.   Pt has hx "suspected schizophrenia"     89 yo W with pmhx COPD, anxiety, depression, HTN, breast CA presents with family for psych evaluation.  History is assisted by the patient and the patient's daughter.  They note that patient was having coughing flares of COPD 3 weeks ago and she was started on course of prednisone by a family member.  She was on the prednisone for almost 2 weeks.  That seemed to have precipitated manic behavior of the patient.  At baseline she lives alone.  Family members note that she has been rambling and seeing biblical allegories from daily activities.  Patient notes that this is not her normal behavior and is aware that she is having abnormal thinking such as seeing numbers in the atmosphere.  Patient recognizes that this is abnormal.  She denies any SI, HI, AVH.  At baseline she lives alone and feels safe at home.  Family members note that she was worse with this sujata 5 days ago and she is improving somewhat.  Patient is adamant that she does not want to go to psychiatric facility because if you put me in a psych hospital, I will die.  She recalls her father dying in a psychiatric hospital.  No EtOH or illicits.  She was hospitalized in a psychiatric facility in the 90s when she had a manic episode that was precipitated by a diet pill.  The daughter is not certain mother needs to go to a psychiatric facility.  The patient wishes to see a psychiatrist to be started on medication and to have more home health care at her house.  SpO2 borderline at triage 92-94%.  Patient denies any chest pain or shortness of breath and feels that her COPD is under control.  Family is requesting labs make sure patient's sodium is into low causing this behavior.        Review of " patient's allergies indicates:   Allergen Reactions    Penicillins Hives and Shortness Of Breath     Past Medical History:   Diagnosis Date    Anxiety     Breast cancer 2000    right (lumpectomy and radiation)    Broken bones     Colon cancer     COPD (chronic obstructive pulmonary disease)     Depression     depression (no meds now)    Hypertension      Past Surgical History:   Procedure Laterality Date    APPENDECTOMY      BREAST LUMPECTOMY Right     colon operation       HYSTERECTOMY      Pt states still have ovaries     TONSILLECTOMY       Family History   Problem Relation Age of Onset    Breast cancer Neg Hx     Colon cancer Neg Hx     Ovarian cancer Neg Hx      Social History     Tobacco Use    Smoking status: Never Smoker    Smokeless tobacco: Never Used   Substance Use Topics    Alcohol use: No    Drug use: No     Review of Systems   Constitutional: Negative for fever.   HENT: Negative for sore throat.    Respiratory: Negative for cough and shortness of breath.    Cardiovascular: Negative for chest pain.   Gastrointestinal: Negative for nausea.   Genitourinary: Negative for dysuria.   Musculoskeletal: Negative for back pain.   Skin: Negative for rash.   Neurological: Negative for weakness.   Hematological: Does not bruise/bleed easily.   Psychiatric/Behavioral: Positive for confusion and sleep disturbance. Negative for agitation, behavioral problems, decreased concentration, dysphoric mood, hallucinations, self-injury and suicidal ideas. The patient is hyperactive. The patient is not nervous/anxious.        Physical Exam     Initial Vitals [07/22/22 1438]   BP Pulse Resp Temp SpO2   (!) 142/70 93 20 98.6 °F (37 °C) (!) 92 %      MAP       --         Physical Exam    Nursing note and vitals reviewed.  Constitutional: She appears well-developed and well-nourished. She is not diaphoretic. No distress.   HENT:   Head: Normocephalic and atraumatic.   Eyes: EOM are normal. Pupils are equal,  round, and reactive to light. Right eye exhibits no discharge. Left eye exhibits no discharge. No scleral icterus.   Neck: Neck supple. No JVD present.   Normal range of motion.  Cardiovascular: Normal rate, regular rhythm and normal heart sounds. Exam reveals no gallop and no friction rub.    No murmur heard.  Pulmonary/Chest: Breath sounds normal. No respiratory distress. She has no wheezes. She has no rhonchi. She has no rales. She exhibits no tenderness.   Speaking complete sentences on room air in no distress   Abdominal: Abdomen is soft. Bowel sounds are normal. She exhibits no distension and no mass. There is no abdominal tenderness. There is no rebound and no guarding.   Musculoskeletal:         General: No tenderness or edema. Normal range of motion.      Cervical back: Normal range of motion and neck supple.     Lymphadenopathy:     She has no cervical adenopathy.   Neurological: She is alert and oriented to person, place, and time. She has normal strength. No sensory deficit.   Skin: Skin is warm and dry. Capillary refill takes less than 2 seconds.   Psychiatric: Her behavior is normal. Her affect is inappropriate. Her speech is rapid and/or pressured. She is not actively hallucinating. Thought content is delusional. Thought content is not paranoid. She expresses impulsivity. She expresses no homicidal and no suicidal ideation. She exhibits normal remote memory. She is attentive.         ED Course   Procedures  Labs Reviewed   CBC W/ AUTO DIFFERENTIAL - Abnormal; Notable for the following components:       Result Value    Lymph % 15.9 (*)     All other components within normal limits   COMPREHENSIVE METABOLIC PANEL - Abnormal; Notable for the following components:    Sodium 135 (*)     Glucose 114 (*)     Albumin 3.4 (*)     All other components within normal limits   ACETAMINOPHEN LEVEL - Abnormal; Notable for the following components:    Acetaminophen (Tylenol), Serum <3.0 (*)     All other components  within normal limits   TSH   URINALYSIS, REFLEX TO URINE CULTURE    Narrative:     Specimen Source->Urine   DRUG SCREEN PANEL, URINE EMERGENCY    Narrative:     Specimen Source->Urine   ALCOHOL,MEDICAL (ETHANOL)   SARS-COV-2 RNA AMPLIFICATION, QUAL          Imaging Results          X-Ray Chest AP Portable (Final result)  Result time 07/22/22 17:30:46    Final result by Enrique Bacon MD (07/22/22 17:30:46)                 Impression:      No acute intrathoracic process.    2.3 cm partially calcified nodule in the right infrahilar region.      Electronically signed by: Enrique Bacon MD  Date:    07/22/2022  Time:    17:30             Narrative:    EXAMINATION:  XR CHEST AP PORTABLE    CLINICAL HISTORY:  Hypoxemia    TECHNIQUE:  Single frontal view of the chest was performed.    COMPARISON:  Esophagram dated 08/12/2021    FINDINGS:  The trachea is unremarkable.  There is stable right paratracheal soft tissue fullness.  The cardiomediastinal silhouette is otherwise unremarkable.  There is no evidence of free air beneath the hemidiaphragms.  There are no pleural effusions.  There is no evidence of a pneumothorax.  There is an azygous fissure.  There is no evidence of pneumomediastinum.  No airspace opacity is present.  There is a 2.3 cm partially calcified nodule in the right infrahilar region.  There are degenerative changes in the osseous structures.                                 Medications - No data to display  Medical Decision Making:   History:   I obtained history from: someone other than patient.  Old Medical Records: I decided to obtain old medical records.  Initial Assessment:   89 yo W with pmhx COPD, anxiety, depression, HTN, breast CA presents with family for psych evaluation for increased delusions, sujata over the past few weeks since taking prednisone.  Differential Diagnosis:   Medication induced sujata, electrolyte abnormalities, infectious issues, psychiatric illness, pneumonia, COVID  Clinical Tests:    Lab Tests: Ordered  Radiological Study: Ordered  ED Management:  No SI, HI, AVH.  I explained to the patient and her daughter that I would be happy to look for an organic medical etiology of the patient's symptoms.  Will obtain labs.  Will hold off on pec at this time and readdress once labs result.    Reassessment:  CBC and CMP overall unremarkable without any significant abnormalities.  TSH normal.  U tox clear.  UA is clear.  COVID is negative.  Chest x-ray negative for acute process.  On repeat assessment, patient remains breathing comfortably.  I had a meeting with the patient's 3 children here.  They agree that there is no danger for the patient to be at home and they are arranging taking turns staying in her house to care for her.  They do not feel there is any emergent indication for psychiatric hospitalization.  After explained our psychiatry process here, they are comfortable deferring for outpatient evaluation.  An ambulatory referral to psychiatry was provided.  Patient already has a psychiatry appointment on Friday.  Patient and her children were explained at length return precautions.  Again, no obvious emergent pec criteria at this time.                      Clinical Impression:   Final diagnoses:  [F30.10] Manic behavior (Primary)          ED Disposition Condition    Discharge Stable        ED Prescriptions     None        Follow-up Information     Follow up With Specialties Details Why Contact Info    Dick Graham MD Internal Medicine   [address] [facility] [address]  [phone]      [facility] - Emergency Dept Emergency Medicine  As needed, If symptoms worsen [address] [facility] [address] [phone]           Rohith Gutierrez MD  07/22/22 [id number]

## 2022-07-24 ENCOUNTER — HOSPITAL ENCOUNTER (INPATIENT)
Facility: HOSPITAL | Age: 87
LOS: 8 days | Discharge: HOME HEALTH | DRG: 885 | End: 2022-08-09
Attending: EMERGENCY MEDICINE | Admitting: HOSPITALIST
Payer: MEDICARE

## 2022-07-24 DIAGNOSIS — T14.8XXA BLISTER: ICD-10-CM

## 2022-07-24 DIAGNOSIS — Z91.89 AT RISK FOR LONG QT SYNDROME: ICD-10-CM

## 2022-07-24 DIAGNOSIS — Z91.89 AT RISK FOR PROLONGED QT INTERVAL SYNDROME: ICD-10-CM

## 2022-07-24 DIAGNOSIS — F23 ACUTE PSYCHOSIS: ICD-10-CM

## 2022-07-24 DIAGNOSIS — H90.3 SENSORINEURAL HEARING LOSS (SNHL) OF BOTH EARS: ICD-10-CM

## 2022-07-24 DIAGNOSIS — R41.0 DRUG-INDUCED DELIRIUM: ICD-10-CM

## 2022-07-24 DIAGNOSIS — J96.01 ACUTE HYPOXEMIC RESPIRATORY FAILURE: ICD-10-CM

## 2022-07-24 DIAGNOSIS — I10 HYPERTENSION, UNSPECIFIED TYPE: ICD-10-CM

## 2022-07-24 DIAGNOSIS — C18.6 MALIGNANT NEOPLASM OF DESCENDING COLON: ICD-10-CM

## 2022-07-24 DIAGNOSIS — F06.30 MOOD DISORDER DUE TO A GENERAL MEDICAL CONDITION: ICD-10-CM

## 2022-07-24 DIAGNOSIS — F30.10 MANIC BEHAVIOR: ICD-10-CM

## 2022-07-24 DIAGNOSIS — T50.905A DRUG-INDUCED DELIRIUM: ICD-10-CM

## 2022-07-24 DIAGNOSIS — J44.1 COPD EXACERBATION: ICD-10-CM

## 2022-07-24 DIAGNOSIS — R41.82 ALTERED MENTAL STATUS, UNSPECIFIED ALTERED MENTAL STATUS TYPE: Primary | ICD-10-CM

## 2022-07-24 DIAGNOSIS — J44.9 COPD, SEVERITY TO BE DETERMINED: ICD-10-CM

## 2022-07-24 DIAGNOSIS — R07.9 CHEST PAIN: ICD-10-CM

## 2022-07-24 DIAGNOSIS — R41.0 DELIRIUM: ICD-10-CM

## 2022-07-24 LAB
ALBUMIN SERPL BCP-MCNC: 3.4 G/DL (ref 3.5–5.2)
ALP SERPL-CCNC: 102 U/L (ref 55–135)
ALT SERPL W/O P-5'-P-CCNC: 13 U/L (ref 10–44)
AMPHET+METHAMPHET UR QL: NEGATIVE
ANION GAP SERPL CALC-SCNC: 11 MMOL/L (ref 8–16)
APAP SERPL-MCNC: <3 UG/ML (ref 10–20)
AST SERPL-CCNC: 16 U/L (ref 10–40)
BARBITURATES UR QL SCN>200 NG/ML: NEGATIVE
BASOPHILS # BLD AUTO: 0.06 K/UL (ref 0–0.2)
BASOPHILS NFR BLD: 0.7 % (ref 0–1.9)
BENZODIAZ UR QL SCN>200 NG/ML: NEGATIVE
BILIRUB SERPL-MCNC: 0.5 MG/DL (ref 0.1–1)
BILIRUB UR QL STRIP: NEGATIVE
BUN SERPL-MCNC: 17 MG/DL (ref 10–30)
BZE UR QL SCN: NEGATIVE
CALCIUM SERPL-MCNC: 9.7 MG/DL (ref 8.7–10.5)
CANNABINOIDS UR QL SCN: NEGATIVE
CHLORIDE SERPL-SCNC: 99 MMOL/L (ref 95–110)
CLARITY UR REFRACT.AUTO: CLEAR
CO2 SERPL-SCNC: 25 MMOL/L (ref 23–29)
COLOR UR AUTO: YELLOW
CREAT SERPL-MCNC: 0.7 MG/DL (ref 0.5–1.4)
CREAT UR-MCNC: 114 MG/DL (ref 15–325)
DIFFERENTIAL METHOD: ABNORMAL
EOSINOPHIL # BLD AUTO: 0.2 K/UL (ref 0–0.5)
EOSINOPHIL NFR BLD: 2.4 % (ref 0–8)
ERYTHROCYTE [DISTWIDTH] IN BLOOD BY AUTOMATED COUNT: 13.4 % (ref 11.5–14.5)
EST. GFR  (AFRICAN AMERICAN): >60 ML/MIN/1.73 M^2
EST. GFR  (NON AFRICAN AMERICAN): >60 ML/MIN/1.73 M^2
ETHANOL SERPL-MCNC: <10 MG/DL
GLUCOSE SERPL-MCNC: 134 MG/DL (ref 70–110)
GLUCOSE UR QL STRIP: NEGATIVE
HCT VFR BLD AUTO: 41.5 % (ref 37–48.5)
HGB BLD-MCNC: 13.5 G/DL (ref 12–16)
HGB UR QL STRIP: NEGATIVE
IMM GRANULOCYTES # BLD AUTO: 0.03 K/UL (ref 0–0.04)
IMM GRANULOCYTES NFR BLD AUTO: 0.3 % (ref 0–0.5)
KETONES UR QL STRIP: NEGATIVE
LEUKOCYTE ESTERASE UR QL STRIP: NEGATIVE
LYMPHOCYTES # BLD AUTO: 1.2 K/UL (ref 1–4.8)
LYMPHOCYTES NFR BLD: 13.6 % (ref 18–48)
MCH RBC QN AUTO: 28.4 PG (ref 27–31)
MCHC RBC AUTO-ENTMCNC: 32.5 G/DL (ref 32–36)
MCV RBC AUTO: 87 FL (ref 82–98)
METHADONE UR QL SCN>300 NG/ML: NEGATIVE
MONOCYTES # BLD AUTO: 0.8 K/UL (ref 0.3–1)
MONOCYTES NFR BLD: 9.8 % (ref 4–15)
NEUTROPHILS # BLD AUTO: 6.3 K/UL (ref 1.8–7.7)
NEUTROPHILS NFR BLD: 73.2 % (ref 38–73)
NITRITE UR QL STRIP: NEGATIVE
NRBC BLD-RTO: 0 /100 WBC
OPIATES UR QL SCN: NEGATIVE
PCP UR QL SCN>25 NG/ML: NEGATIVE
PH UR STRIP: 6 [PH] (ref 5–8)
PLATELET # BLD AUTO: 246 K/UL (ref 150–450)
PMV BLD AUTO: 10.5 FL (ref 9.2–12.9)
POTASSIUM SERPL-SCNC: 4 MMOL/L (ref 3.5–5.1)
PROT SERPL-MCNC: 7 G/DL (ref 6–8.4)
PROT UR QL STRIP: NEGATIVE
RBC # BLD AUTO: 4.75 M/UL (ref 4–5.4)
SARS-COV-2 RDRP RESP QL NAA+PROBE: NEGATIVE
SODIUM SERPL-SCNC: 135 MMOL/L (ref 136–145)
SP GR UR STRIP: 1.02 (ref 1–1.03)
TOXICOLOGY INFORMATION: NORMAL
TSH SERPL DL<=0.005 MIU/L-ACNC: 1.55 UIU/ML (ref 0.4–4)
URN SPEC COLLECT METH UR: NORMAL
WBC # BLD AUTO: 8.58 K/UL (ref 3.9–12.7)

## 2022-07-24 PROCEDURE — G0378 HOSPITAL OBSERVATION PER HR: HCPCS

## 2022-07-24 PROCEDURE — 93005 ELECTROCARDIOGRAM TRACING: CPT

## 2022-07-24 PROCEDURE — 90792 PR PSYCHIATRIC DIAGNOSTIC EVALUATION W/MEDICAL SERVICES: ICD-10-PCS | Mod: ,,, | Performed by: PSYCHIATRY & NEUROLOGY

## 2022-07-24 PROCEDURE — 25000003 PHARM REV CODE 250

## 2022-07-24 PROCEDURE — 99220 PR INITIAL OBSERVATION CARE,LEVL III: CPT | Mod: GC,,, | Performed by: HOSPITALIST

## 2022-07-24 PROCEDURE — U0002 COVID-19 LAB TEST NON-CDC: HCPCS | Performed by: EMERGENCY MEDICINE

## 2022-07-24 PROCEDURE — 80053 COMPREHEN METABOLIC PANEL: CPT

## 2022-07-24 PROCEDURE — 25000003 PHARM REV CODE 250: Performed by: STUDENT IN AN ORGANIZED HEALTH CARE EDUCATION/TRAINING PROGRAM

## 2022-07-24 PROCEDURE — 82077 ASSAY SPEC XCP UR&BREATH IA: CPT

## 2022-07-24 PROCEDURE — 99285 EMERGENCY DEPT VISIT HI MDM: CPT | Mod: CS,,, | Performed by: EMERGENCY MEDICINE

## 2022-07-24 PROCEDURE — 84443 ASSAY THYROID STIM HORMONE: CPT

## 2022-07-24 PROCEDURE — 90792 PSYCH DIAG EVAL W/MED SRVCS: CPT | Mod: ,,, | Performed by: PSYCHIATRY & NEUROLOGY

## 2022-07-24 PROCEDURE — 99285 EMERGENCY DEPT VISIT HI MDM: CPT

## 2022-07-24 PROCEDURE — 80307 DRUG TEST PRSMV CHEM ANLYZR: CPT

## 2022-07-24 PROCEDURE — 99285 PR EMERGENCY DEPT VISIT,LEVEL V: ICD-10-PCS | Mod: CS,,, | Performed by: EMERGENCY MEDICINE

## 2022-07-24 PROCEDURE — 80143 DRUG ASSAY ACETAMINOPHEN: CPT

## 2022-07-24 PROCEDURE — 85025 COMPLETE CBC W/AUTO DIFF WBC: CPT

## 2022-07-24 PROCEDURE — 93010 ELECTROCARDIOGRAM REPORT: CPT | Mod: ,,, | Performed by: INTERNAL MEDICINE

## 2022-07-24 PROCEDURE — 93010 EKG 12-LEAD: ICD-10-PCS | Mod: ,,, | Performed by: INTERNAL MEDICINE

## 2022-07-24 PROCEDURE — 99220 PR INITIAL OBSERVATION CARE,LEVL III: ICD-10-PCS | Mod: GC,,, | Performed by: HOSPITALIST

## 2022-07-24 PROCEDURE — 81003 URINALYSIS AUTO W/O SCOPE: CPT

## 2022-07-24 RX ORDER — AMLODIPINE BESYLATE 2.5 MG/1
2.5 TABLET ORAL DAILY
Status: DISCONTINUED | OUTPATIENT
Start: 2022-07-24 | End: 2022-07-25

## 2022-07-24 RX ORDER — ENOXAPARIN SODIUM 100 MG/ML
40 INJECTION SUBCUTANEOUS EVERY 24 HOURS
Status: DISCONTINUED | OUTPATIENT
Start: 2022-07-24 | End: 2022-08-09 | Stop reason: HOSPADM

## 2022-07-24 RX ORDER — HYDROCHLOROTHIAZIDE 25 MG/1
25 TABLET ORAL DAILY
Status: DISCONTINUED | OUTPATIENT
Start: 2022-07-24 | End: 2022-07-25

## 2022-07-24 RX ORDER — IBUPROFEN 200 MG
16 TABLET ORAL
Status: DISCONTINUED | OUTPATIENT
Start: 2022-07-24 | End: 2022-08-09 | Stop reason: HOSPADM

## 2022-07-24 RX ORDER — GLUCAGON 1 MG
1 KIT INJECTION
Status: DISCONTINUED | OUTPATIENT
Start: 2022-07-24 | End: 2022-08-09 | Stop reason: HOSPADM

## 2022-07-24 RX ORDER — IPRATROPIUM BROMIDE AND ALBUTEROL SULFATE 2.5; .5 MG/3ML; MG/3ML
3 SOLUTION RESPIRATORY (INHALATION) EVERY 4 HOURS PRN
Status: DISCONTINUED | OUTPATIENT
Start: 2022-07-24 | End: 2022-08-02

## 2022-07-24 RX ORDER — FAMOTIDINE 20 MG/1
20 TABLET, FILM COATED ORAL DAILY
Status: DISCONTINUED | OUTPATIENT
Start: 2022-07-24 | End: 2022-07-30

## 2022-07-24 RX ORDER — SODIUM CHLORIDE 0.9 % (FLUSH) 0.9 %
10 SYRINGE (ML) INJECTION EVERY 12 HOURS PRN
Status: DISCONTINUED | OUTPATIENT
Start: 2022-07-24 | End: 2022-08-09 | Stop reason: HOSPADM

## 2022-07-24 RX ORDER — NALOXONE HCL 0.4 MG/ML
0.02 VIAL (ML) INJECTION
Status: DISCONTINUED | OUTPATIENT
Start: 2022-07-24 | End: 2022-08-09 | Stop reason: HOSPADM

## 2022-07-24 RX ORDER — LEVOTHYROXINE SODIUM 50 UG/1
50 TABLET ORAL DAILY
Status: DISCONTINUED | OUTPATIENT
Start: 2022-07-25 | End: 2022-07-25

## 2022-07-24 RX ORDER — QUETIAPINE FUMARATE 25 MG/1
50 TABLET, FILM COATED ORAL NIGHTLY
Status: DISCONTINUED | OUTPATIENT
Start: 2022-07-24 | End: 2022-07-25

## 2022-07-24 RX ORDER — METHOCARBAMOL 500 MG/1
1000 TABLET, FILM COATED ORAL 4 TIMES DAILY PRN
Status: DISCONTINUED | OUTPATIENT
Start: 2022-07-24 | End: 2022-07-25

## 2022-07-24 RX ORDER — IBUPROFEN 200 MG
24 TABLET ORAL
Status: DISCONTINUED | OUTPATIENT
Start: 2022-07-24 | End: 2022-08-09 | Stop reason: HOSPADM

## 2022-07-24 RX ADMIN — HYDROCHLOROTHIAZIDE 25 MG: 25 TABLET ORAL at 03:07

## 2022-07-24 RX ADMIN — METHOCARBAMOL 1000 MG: 500 TABLET ORAL at 11:07

## 2022-07-24 RX ADMIN — QUETIAPINE FUMARATE 50 MG: 25 TABLET ORAL at 09:07

## 2022-07-24 RX ADMIN — FAMOTIDINE 20 MG: 20 TABLET ORAL at 03:07

## 2022-07-24 RX ADMIN — AMLODIPINE BESYLATE 2.5 MG: 2.5 TABLET ORAL at 03:07

## 2022-07-24 NOTE — H&P
"[facility] - Emergency Dept  Hospital Medicine  History & Physical    Patient Name: Carmina Arcos  MRN: [id number]  Patient Class: OP- Observation  Admission Date: 7/24/2022  Attending Physician: Poppy Gonzales MD   Primary Care Provider: Dick Graham MD         Patient information was obtained from relative(s) and ER records.     Subjective:     Principal Problem:Manic behavior    Chief Complaint:   Chief Complaint   Patient presents with    Multiple complaints     Daughter want psych eval, not sleeping, took sons prednisone        HPI: History provided mostly by daughter Nimco. Ms Grewal is a 89 yo that  according to daughter after having a 3-4 weeks ago COPD exacerbation, was started on Prednisone by her son who is a nurse. States  that after a few days she started noticing behavioral changes described as flight of ideas, insomnia, rapid speech, alternating irritability with excitability, grandiose delusions auditory hallucinations, fixation with allegories, numerology and Taoism.  Daughter also affirms, reckless spending. She was taken to [facility] but there was no psych service available. They decided to come to [facility] for psychiatric evaluation.  States previous episode precipitated by "diet pills " in 1999 and had to be committed to [email or web address] for a week. There she was started on risperidone, its unclear for how long she was on it. Daughter states that the patient also has a history of depression with ne suicide attempt.    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 She currently was living on her own and managed rental property, her family lives close to her home and visit regularly.    PMH: Breast Cancer? Colon Cancer. HTN treated with losartan hydrochlorothiazide and amlodipine, Hypothyroidism treated with levothyroxine, COPD treated with with formoterol-glycopyrrolate , albuterol PRN and Sildenafil given by his " son.    Surgical hx positive for bilateral knee replacement, tonsillectomy, hysterectomy, right lumpectomy and colon surgery .    Allergies: Penicillin      Past Medical History:   Diagnosis Date    Anxiety     Breast cancer 2000    right (lumpectomy and radiation)    Broken bones     Colon cancer     COPD (chronic obstructive pulmonary disease)     Depression     depression (no meds now)    Hypertension        Past Surgical History:   Procedure Laterality Date    APPENDECTOMY      BREAST LUMPECTOMY Right     colon operation       COLON SURGERY      HYSTERECTOMY      Pt states still have ovaries     JOINT REPLACEMENT      TONSILLECTOMY         Review of patient's allergies indicates:   Allergen Reactions    Penicillins Hives and Shortness Of Breath       Current Facility-Administered Medications on File Prior to Encounter   Medication    famotidine tablet 20 mg     Current Outpatient Medications on File Prior to Encounter   Medication Sig    amLODIPine (NORVASC) 2.5 MG tablet 1 tab Oral daily (standard)    BEVESPI AEROSPHERE 9-4.8 mcg HFAA Inhale 2 puffs into the lungs 2 (two) times daily.    famotidine (PEPCID) 10 MG tablet Take 10 mg by mouth 2 (two) times daily.    hydrochlorothiazide (HYDRODIURIL) 25 MG tablet Take 25 mg by mouth once daily.    levothyroxine (SYNTHROID) 50 MCG tablet Take 50 mcg by mouth once daily.    losartan (COZAAR) 100 MG tablet      Family History    None       Tobacco Use    Smoking status: Current Every Day Smoker     Types: Cigarettes    Smokeless tobacco: Never Used    Tobacco comment: smoked for 30-35 years per daughter quit many years ago   Substance and Sexual Activity    Alcohol use: Yes     Comment: socially less than one a month    Drug use: No    Sexual activity: Not Currently     Review of Systems   Constitutional:  Negative for activity change, chills, fatigue and fever.   HENT:  Positive for trouble swallowing. Negative for sore throat.     Respiratory:  Negative for apnea, choking and shortness of breath.    Cardiovascular:  Negative for chest pain and palpitations.   Gastrointestinal:  Negative for abdominal pain and anal bleeding.   Endocrine: Negative for cold intolerance.   Genitourinary:  Negative for difficulty urinating and dysuria.   Musculoskeletal:  Negative for arthralgias and gait problem.   Neurological:  Negative for light-headedness, numbness and headaches.   Psychiatric/Behavioral:  Negative for agitation, behavioral problems, confusion and decreased concentration.    Objective:     Vital Signs (Most Recent):  Temp: 98.7 °F (37.1 °C) (07/24/22 1820)  Pulse: 96 (07/24/22 1812)  Resp: 18 (07/24/22 1010)  BP: 135/74 (07/24/22 1810)  SpO2: 95 % (07/24/22 1812) Vital Signs (24h Range):  Temp:  [98.4 °F (36.9 °C)-98.7 °F (37.1 °C)] 98.7 °F (37.1 °C)  Pulse:  [] 96  Resp:  [18] 18  SpO2:  [95 %] 95 %  BP: (135-177)/(69-85) 135/74     Weight: 80.3 kg (177 lb)  Body mass index is 31.35 kg/m².    Physical Exam  Constitutional:       General: She is not in acute distress.     Appearance: She is not toxic-appearing.   HENT:      Head: Normocephalic and atraumatic.      Nose: Nose normal.      Mouth/Throat:      Mouth: Mucous membranes are moist.      Pharynx: Oropharynx is clear.   Eyes:      Extraocular Movements: Extraocular movements intact.      Conjunctiva/sclera: Conjunctivae normal.      Pupils: Pupils are equal, round, and reactive to light.   Cardiovascular:      Rate and Rhythm: Normal rate and regular rhythm.      Heart sounds: No murmur heard.    No gallop.   Pulmonary:      Effort: Pulmonary effort is normal. No respiratory distress.      Breath sounds: Normal breath sounds.   Abdominal:      General: There is no distension.      Palpations: There is no mass.      Tenderness: There is no abdominal tenderness.      Hernia: No hernia is present.   Musculoskeletal:      Cervical back: Normal range of motion and neck supple. No  rigidity.      Right lower leg: No edema.      Left lower leg: No edema.   Skin:     General: Skin is warm.   Neurological:      Mental Status: She is alert and oriented to person, place, and time.   Psychiatric:      Comments: Grandiose delusions, rapid speech, emotional lability         CRANIAL NERVES     CN III, IV, VI   Pupils are equal, round, and reactive to light.     Significant Labs: All pertinent labs within the past 24 hours have been reviewed.  Recent Lab Results         07/24/22  1344   07/24/22  1043   07/24/22  1036        Benzodiazepines Negative           Methadone metabolites Negative           Phencyclidine Negative           Acetaminophen (Tylenol), Serum     <3.0  Comment: Toxic Levels:  Adults (4 hr post-ingestion).........>150 ug/mL  Adults (12 hr post-ingestion)........>40 ug/mL  Peds (2 hr post-ingestion, liquid)...>225 ug/mL         Albumin     3.4       Alcohol, Serum     <10       Alkaline Phosphatase     102       ALT     13       Amphetamine Screen, Ur Negative           Anion Gap     11       Appearance, UA   Clear         AST     16       Barbiturate Screen, Ur Negative           Baso #     0.06       Basophil %     0.7       Bilirubin (UA)   Negative         BILIRUBIN TOTAL     0.5  Comment: For infants and newborns, interpretation of results should be based  on gestational age, weight and in agreement with clinical  observations.    Premature Infant recommended reference ranges:  Up to 24 hours.............<8.0 mg/dL  Up to 48 hours............<12.0 mg/dL  3-5 days..................<15.0 mg/dL  6-29 days.................<15.0 mg/dL         BUN     17       Calcium     9.7       Chloride     99       CO2     25       Cocaine (Metab.) Negative           Color, UA   Yellow         Creatinine     0.7       Creatinine, Urine 114.0           Differential Method     Automated       eGFR if      >60.0       eGFR if non      >60.0  Comment: Calculation used to  obtain the estimated glomerular filtration  rate (eGFR) is the CKD-EPI equation.          Eos #     0.2       Eosinophil %     2.4       Glucose     134       Glucose, UA   Negative         Gran # (ANC)     6.3       Gran %     73.2       Hematocrit     41.5       Hemoglobin     13.5       Immature Grans (Abs)     0.03  Comment: Mild elevation in immature granulocytes is non specific and   can be seen in a variety of conditions including stress response,   acute inflammation, trauma and pregnancy. Correlation with other   laboratory and clinical findings is essential.         Immature Granulocytes     0.3       Ketones, UA   Negative         Leukocytes, UA   Negative         Lymph #     1.2       Lymph %     13.6       MCH     28.4       MCHC     32.5       MCV     87       Mono #     0.8       Mono %     9.8       MPV     10.5       NITRITE UA   Negative         nRBC     0       Occult Blood UA   Negative         Opiate Scrn, Ur Negative           pH, UA   6.0         Platelets     246       Potassium     4.0       PROTEIN TOTAL     7.0       Protein, UA   Negative  Comment: Recommend a 24 hour urine protein or a urine   protein/creatinine ratio if globulin induced proteinuria is  clinically suspected.           RBC     4.75       RDW     13.4       SARS-CoV-2 RNA, Amplification, Qual   Negative  Comment: This test utilizes isothermal nucleic acid amplification   technology to detect the SARS-CoV-2 RdRp nucleic acid segment.   The analytical sensitivity (limit of detection) is 125 genome   equivalents/mL.     A POSITIVE result implies infection with the SARS-CoV-2 virus;  the patient is presumed to be contagious.    A NEGATIVE result means that SARS-CoV-2 nucleic acids are not  present above the limit of detection. A NEGATIVE result should be   treated as presumptive. It does not rule out the possibility of   COVID-19 and should not be the sole basis for treatment decisions.   If COVID-19 is strongly suspected  based on clinical and exposure   history, re-testing using an alternate molecular assay should be   considered.       This test is only for use under the Food and Drug   Administration s Emergency Use Authorization (EUA).   Commercial kits are provided by [employer].   Performance characteristics of the EUA have been independently  verified by [facility] Department of  Pathology and Laboratory Medicine.   _________________________________________________________________  The ID NOW COVID-19 Letter of Authorization, along with the   authorized Fact Sheet for Healthcare Providers, the authorized Fact  Sheet for Patients, and authorized labeling are available on the FDA   website:  www.fda.gov/MedicalDevices/Safety/EmergencySituations/dnb104235.htm           Sodium     135       Specific [place], UA   1.020         Specimen UA   Urine, Clean Catch         Marijuana (THC) Metabolite Negative           Toxicology Information SEE COMMENT  Comment: This screen includes the following classes of drugs at the listed   cut-off:    Benzodiazepines 200 ng/ml  Methadone 300 ng/ml  Cocaine metabolite 300 ng/ml  Opiates 300 ng/ml  Barbiturates 200 ng/ml  Amphetamines 1000 ng/ml  Marijuana metabs (THC) 50 ng/ml  Phencyclidine (PCP) 25 ng/ml    This is a screening test. If results do not correlate with clinical   presentation, then a confirmatory send out test is advised.     This report is intended for use in clinical monitoring and management   of   patients. It is not intended for use in employment related drug   testing.             TSH     1.547       WBC     8.58               Significant Imaging: I have reviewed all pertinent imaging results/findings within the past 24 hours.    Assessment/Plan:     * Drug-induced delirium  Ms. Arcos is a 89 yo that  according to daughter after having a 3-4 weeks ago COPD exacerbation, was started on Prednisone by her son who is a nurse. States  that after a few days she  started noticing behavioral changes described as flight of ideas, insomnia, rapid speech, alternating irritability with excitability, grandiose delusions auditory hallucinations, fixation with allegories, numerology and Christian.     Differential includes but not limited to drug induced delirium like from recent steroids use vs manic episode from mood disorder. Unlikely to be 2/2 to infection given normal WBC and lacks of infectious symptoms. UA is also negative. UDS is also negative. TSH is normal, low likely moran for thyroid condition     - Psychiatry consult in the ED; recommended Seraquel 50 mg QHS. QTc 470  - Continue to monitor mood and mental status   - Delirium precautions   - Holding further steroids       COPD exacerbation  - Not in exacerbation at this time   - Duo Neb PRN     Mood disorder due to a general medical condition  See drug induced delirium     Sensorineural hearing loss (SNHL) of both ears        Hypertension  Home regimen: amlodipine 2.5 mg QD, HCTZ 25 mg QD, Losartan 100 mg QD     Plan:   - Resume home regimen       Hx of breast cancer  S/p right lumpectomy and radiation     VTE Risk Mitigation (From admission, onward)         Ordered     enoxaparin injection 40 mg  Daily         07/24/22 1332     IP VTE HIGH RISK PATIENT  Once         07/24/22 1332     Place sequential compression device  Until discontinued         07/24/22 1332                   Gadiel Grajeda MD  Department of Hospital Medicine   [facility] - Emergency Dept

## 2022-07-24 NOTE — HPI
"Carmina Arcos is a 89 y.o. female with a past psychiatric history of substance-induced psychotic disorder who presented to [facility] due to Manic behavior. Psychiatry was consulted for "sujata x several days, multiple ED visits".    Per Primary Team:  89 yo W with pmhx COPD, anxiety, depression, HTN, breast CA presents with family for psych evaluation.  History is assisted by the patient and the patient's daughter.  They note that patient was having coughing flares of COPD 3 weeks ago and she was started on course of prednisone by a family member.  She was on the prednisone for almost 2 weeks.  The prednisone has since discontinued that she continues to have manic symptoms including insomnia, reckless spending behavior, typical references Septra.  She was seen in the emergency department several days ago and discharged home with close observation by family members and outpatient psychiatry follow-up.  That follow-up is in 6 days.  Family states that she will need a sooner psychiatric evaluation.  She did have history of manic episode many years ago in the setting of taking a diet pill.  She was admitted inpatient time.  The patient does report some foul-smelling odor in her urine and is concerned that she has a kidney infection.  Otherwise she is essentially without complaints.     The history is provided by the patient and a relative. No  was used.     Per Psychiatry:  Patient was seen in the ED with daughter at bedside; pt and daughter provided history together. Pt was alert, oriented x4, rapid pressured interruptive speech, euthymic affect. They report about a week of psychiatric symptoms including excitability/irritability, anxiety, insomnia, hyperactivity, visual hallucinations, hyperreligious and grandiose delusions. Pt has been preoccupied with numerology and believes her family is part of a biblical story and that [facility]. She has also been " "impulsively shopping and spending large amounts of money. She has intermittent confusion, FOI, BALA on exam.    Pt has a history of COPD and started a tapered 2-wk course of PO Prednisone for a presumed exacerbation, which she got from her son (a nurse and RT). Before the course was completed, the pt's psychiatric symptoms started as described above. Family was concerned that the Prednisone may be causing her symptoms but they could not convince her to stop taking it. They presented to the ED several times since then and were initially comfortable with outpatient follow-up with pt's former psychiatrist, Dr. Ceballos, on Friday 7/29. However, they have become increasingly concerned with pt's symptoms and feel she may be a danger to herself.     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 She speaks several languages and has no known history of cognitive decline. Her psychiatric history includes one substance-induced psychotic episode in 1999. She took some stimulant diet pills, developed insomnia and visual hallucinations, and was admitted to [place]. She was started on Risperdal and Zoloft, which she continued to see Dr. Ceballos for for some time. Eventually her symptoms resolved and she went back to her "relatively high-strung" baseline, per daughter. She has been out of Dr. Ceballos's care and not having psychiatric issues for the past 10+ years. Pt's daughter also reports a remote suicide attempt over 30 yrs ago, via Benadryl overdose.    Prior to the course of Prednisone, pt was at baseline with none of her current symptoms. She last took Prednisone on Wednesday 7/20 but the symptoms have persisted. Pt and family are amenable to inpt hospitalization for her current symptoms.    Psychiatric Review of Systems-is patient experiencing or having changes in  sleep: yes, insomnia  appetite: no  weight: no  energy/anergy: yes, " hyperactive  interest/pleasure/anhedonia: no  somatic symptoms: no  libido: no  anxiety/panic: yes  guilty/hopelessness: no  concentration: yes  S.I.B.s/risky behavior: yes, impulsive shopping  any drugs: no  alcohol: no     Allergies:  Penicillins

## 2022-07-24 NOTE — ED TRIAGE NOTES
"Carmina Arcos, a 89 y.o. female presents to the ED w/ complaint of psych eval. Pt recently seen for the same chief complaint. Pt family members assist w/ history. Family reports she has been taking prednisone, and subsequently developed manic behavior. Example of behavior includes, obsession w/ symbolism/Hinduism, bouts of extreme crying/laughter, "patterns," obsession w/ past family history/deaths, excessive spending, insomnia per family. On assessment, pt is rambling, paranoid, w/ labile mood. Pt states she is concerned for a kidney infection, acknowledges that she has been manic. Family states they no longer feel as though they can care for her. Hx of dementia, HTN. AAO x4.     Triage note:  Chief Complaint   Patient presents with    Multiple complaints     Daughter want psych eval, not sleeping, took sons prednisone     Review of patient's allergies indicates:   Allergen Reactions    Penicillins Hives and Shortness Of Breath     Past Medical History:   Diagnosis Date    Anxiety     Breast cancer 2000    right (lumpectomy and radiation)    Broken bones     Colon cancer     COPD (chronic obstructive pulmonary disease)     Depression     depression (no meds now)    Hypertension        "

## 2022-07-24 NOTE — ED NOTES
Pt and pt's family member called for a nurse to the room, I went into the room to help and pt was coughing a lot, family member stated she had just taken a bite of her food. She spit up pieces of her food and remained coughing. Pt took sip of water appropriately and did not vomit but coughed up pieces of her food into a paper towel. Notified ED provider immediately.

## 2022-07-24 NOTE — CONSULTS
"[facility]  Psychiatry  Consult Note    Patient Name: Carmina Arcos  MRN: [id number]   Code Status: Full Code  Admission Date: 7/24/2022  Hospital Length of Stay: 0 days  Attending Physician: Poppy Gonzales MD  Primary Care Provider: Dick Graham MD    Current Legal Status: Uncontested    Patient information was obtained from patient, relative(s), past medical records and ER records.   Inpatient consult to Psychiatry  Consult performed by: Alba Tyson MD  Consult ordered by: Marysol Vora MD        Subjective:     Principal Problem:Manic behavior    Chief Complaint:  As above    HPI:   Carmina Arcos is a 89 y.o. female with a past psychiatric history of substance-induced psychotic disorder who [place] [facility] [place]ic [place]. Psychiatry was consulted for "sujata x several days, multiple ED visits".    Per Primary Team:  89 yo W with pmhx COPD, anxiety, depression, HTN, breast CA presents with family for psych evaluation.  History is assisted by the patient and the patient's daughter.  They note that patient was having coughing flares of COPD 3 weeks ago and she was started on course of prednisone by a family member.  She was on the prednisone for almost 2 weeks.  The prednisone has since discontinued that she continues to have manic symptoms including insomnia, reckless spending behavior, typical references Septra.  She was seen in the emergency department several days ago and discharged home with close observation by family members and outpatient psychiatry follow-up.  That follow-up is in 6 days.  Family states that she will need a sooner psychiatric evaluation.  She did have history of manic episode many years ago in the setting of taking a diet pill.  She was admitted inpatient time.  The patient does report some foul-smelling odor in her urine and is concerned that she has a kidney infection.  Otherwise she is essentially without complaints.     The history is provided by " "the patient and a relative. No  was used.     Per Psychiatry:  Patient was seen in the ED with daughter at bedside; pt and daughter provided history together. Pt was alert, oriented x4, rapid pressured interruptive speech, euthymic affect. They report about a week of psychiatric symptoms including excitability/irritability, anxiety, insomnia, hyperactivity, visual hallucinations, hyperreligious and grandiose delusions. Pt has been preoccupied with numerology and believes her family is part of a biblical story and that [facility]. She has also been impulsively shopping and spending large amounts of money. She has intermittent confusion, FOI, BALA on exam.    Pt has a history of COPD and started a tapered 2-wk course of PO Prednisone for a presumed exacerbation, which she got from her son (a nurse and RT). Before the course was completed, the pt's psychiatric symptoms started as described above. Family was concerned that the Prednisone may be causing her symptoms but they could not convince her to stop taking it. They presented to the ED several times since then and were initially comfortable with outpatient follow-up with pt's former psychiatrist, Dr. Ceballos, on Friday 7/29. However, they have become increasingly concerned with pt's symptoms and feel she may be a danger to herself.     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 She speaks several languages and has no known history of cognitive decline. Her psychiatric history includes one substance-induced psychotic episode in 1999. She took some stimulant diet pills, developed insomnia and visual hallucinations, and was admitted to [facility]. She was started on Risperdal and Zoloft, which she continued to see Dr. Ceballos for for some time. Eventually her symptoms resolved and she went back to her "relatively high-strung" baseline, per " "daughter. She has been out of Dr. Ceballos's care and not having psychiatric issues for the past 10+ years. Pt's daughter also reports a remote suicide attempt over 30 yrs ago, via Benadryl overdose.    Prior to the course of Prednisone, pt was at baseline with none of her current symptoms. She last took Prednisone on Wednesday 7/20 but the symptoms have persisted. Pt and family are amenable to inpt hospitalization for her current symptoms.    Medical Review of Systems:  Complete review of systems performed covering Constitutional, Eyes, ENT/Mouth, Cardiovascular, Respiratory, Gastrointestinal, Genitourinary, Musculoskeletal, Skin, Neurologic, Endocrine, Heme/Lymph, and Allergy/Immune.     Complete review of systems was negative with the exception of the following positive symptoms: "funny feeling" in pt's gums/teeth    Psychiatric Review of Systems-is patient experiencing or having changes in  sleep: yes, insomnia  appetite: no  weight: no  energy/anergy: yes, hyperactive  interest/pleasure/anhedonia: no  somatic symptoms: no  libido: no  anxiety/panic: yes  guilty/hopelessness: no  concentration: yes  S.I.B.s/risky behavior: yes, impulsive shopping  any drugs: no  alcohol: no     Allergies:  Penicillins    Past Medical/Surgical History:  Past Medical History:   Diagnosis Date    Anxiety     Breast cancer 2000    right (lumpectomy and radiation)    Broken bones     Colon cancer     COPD (chronic obstructive pulmonary disease)     Depression     depression (no meds now)    Hypertension      Past Surgical History:   Procedure Laterality Date    APPENDECTOMY      BREAST LUMPECTOMY Right     colon operation       HYSTERECTOMY      Pt states still have ovaries     TONSILLECTOMY         Past Psychiatric History:  Previous Medication Trials:  yes, Risperdal and Zoloft    Previous Psychiatric Hospitalizations: yes   Previous Suicide Attempts: yes   History of Violence: no  Outpatient Psychiatrist:  Dr. Ceballos " (formerly, with plans to re-establish)    Social History:  Marital Status:   Children: 3   Employment Status/Info: retired  Education: college graduate  Special Ed: no  Housing Status: lives alone  History of phys/sexual abuse: no  Access to gun: no    Substance Abuse History:  Recreational Drugs:  denied  Use of Alcohol: occasional, social use  Rehab History: no   Tobacco Use: no  Use of OTC: denied    Legal History:  Past Charges/Incarcerations: no   Pending charges: no     Family Psychiatric History:   denied    Psychosocial Stressors: health  Functioning Relationships: good support system and poor relationship with children      Hospital Course: No notes on file         Patient History               Medical as of 7/24/2022       Past Medical History       Diagnosis Date Comments Source    Anxiety -- -- Provider    Breast cancer 2000 right (lumpectomy and radiation) Provider    Broken bones -- -- Provider    Colon cancer -- -- Provider    COPD (chronic obstructive pulmonary disease) -- -- Provider    Depression -- depression (no meds now) Provider    Hypertension -- -- Provider                          Surgical as of 7/24/2022       Past Surgical History       Procedure Laterality Date Comments Source    HYSTERECTOMY -- -- Pt states still have ovaries  Provider    APPENDECTOMY -- -- -- Provider    TONSILLECTOMY -- -- -- Provider    BREAST LUMPECTOMY Right -- -- Provider    colon operation  [Other] -- -- -- Provider                          Family as of 7/24/2022       Problem Relation Name Age of Onset Comments Source    Breast cancer Neg Hx -- -- -- Provider    Colon cancer Neg Hx -- -- -- Provider    Ovarian cancer Neg Hx -- -- -- Provider                  Tobacco Use as of 7/24/2022       Smoking Status Smoking Start Date Smoking Quit Date Packs/Day Years Used    Never Smoker -- -- -- --      Types Comments Smokeless Tobacco Status Smokeless Tobacco Quit Date Source    -- -- Never Used -- Provider                   Alcohol Use as of 7/24/2022       Alcohol Use Drinks/Week Alcohol/Week Comments Source    No   -- -- Provider                  Drug Use as of 7/24/2022       Drug Use Types Frequency Comments Source    No -- -- -- Provider                  Sexual Activity as of 7/24/2022       Sexually Active Birth Control Partners Comments Source    Never -- -- -- Provider                  Activities of Daily Living as of 7/24/2022    None               Social Documentation as of 7/24/2022    None               Occupational as of 7/24/2022    None               Socioeconomic as of 7/24/2022       Marital Status Spouse Name Number of Children Years Education Education Level Preferred Language Ethnicity Race Source     -- -- -- -- English Not  or /a White --                  Pertinent History       Question Response Comments    Lives with -- --    Place in Birth Order -- --    Lives in -- --    Number of Siblings -- --    Raised by -- --    Legal Involvement -- --    Childhood Trauma -- --    Criminal History of -- --    Financial Status -- --    Highest Level of Education -- --    Does patient have access to a firearm? -- --     Service -- --    Primary Leisure Activity -- --    Spirituality -- --          Past Medical History:   Diagnosis Date    Anxiety     Breast cancer 2000    right (lumpectomy and radiation)    Broken bones     Colon cancer     COPD (chronic obstructive pulmonary disease)     Depression     depression (no meds now)    Hypertension      Past Surgical History:   Procedure Laterality Date    APPENDECTOMY      BREAST LUMPECTOMY Right     colon operation       HYSTERECTOMY      Pt states still have ovaries     TONSILLECTOMY       Family History    None       Tobacco Use    Smoking status: Never Smoker    Smokeless tobacco: Never Used   Substance and Sexual Activity    Alcohol use: No    Drug use: No    Sexual activity: Never     Review of patient's allergies  "indicates:   Allergen Reactions    Penicillins Hives and Shortness Of Breath       Current Facility-Administered Medications on File Prior to Encounter   Medication    famotidine tablet 20 mg     Current Outpatient Medications on File Prior to Encounter   Medication Sig    amLODIPine (NORVASC) 2.5 MG tablet 1 tab Oral daily (standard)    BEVESPI AEROSPHERE 9-4.8 mcg HFAA Inhale 2 puffs into the lungs 2 (two) times daily.    famotidine (PEPCID) 10 MG tablet Take 10 mg by mouth 2 (two) times daily.    hydrochlorothiazide (HYDRODIURIL) 25 MG tablet Take 25 mg by mouth once daily.    levothyroxine (SYNTHROID) 50 MCG tablet Take 50 mcg by mouth once daily.    losartan (COZAAR) 100 MG tablet      Psychotherapeutics (From admission, onward)                None            Strengths and Liabilities: Strength: Patient is intelligent., Strength: Patient has positive support network., Strength: Patient has reasonable judgment., Liability: Patient is impulsive.    Objective:     Vital Signs (Most Recent):  Temp: 98.4 °F (36.9 °C) (07/24/22 1010)  Pulse: 104 (07/24/22 1317)  Resp: 18 (07/24/22 1010)  BP: (!) 177/85 (07/24/22 1315)  SpO2: 95 % (07/24/22 1317)   Vital Signs (24h Range):  Temp:  [98.4 °F (36.9 °C)] 98.4 °F (36.9 °C)  Pulse:  [] 104  Resp:  [18] 18  SpO2:  [95 %] 95 %  BP: (142-177)/(69-85) 177/85     Height: 5' 3" (160 cm)  Weight: 80.3 kg (177 lb)  Body mass index is 31.35 kg/m².    No intake or output data in the 24 hours ending 07/24/22 1654    MSE:  Orientation: oriented to person, place, time, situation  Speech: rapid, pressured, interruptive  Language: fluent  Mood: anxious  Affect: elevated, labile, irritable  Thought Process: racing, FOI, tangential  Associations: loose  Thought Content: No SI/HI/AVH. +delusions, grandiose, hyperreligious  Memory: Recent intact, Remote intact  Attention and Concentration: Distractible  Fund of Knowledge: Appropriate for education level  Insight: Good  Judgment: " Poor    Significant Labs: Last 24 Hours:   Recent Lab Results         07/24/22  1344   07/24/22  1043   07/24/22  1036        Benzodiazepines Negative           Methadone metabolites Negative           Phencyclidine Negative           Acetaminophen (Tylenol), Serum     <3.0  Comment: Toxic Levels:  Adults (4 hr post-ingestion).........>150 ug/mL  Adults (12 hr post-ingestion)........>40 ug/mL  Peds (2 hr post-ingestion, liquid)...>225 ug/mL         Albumin     3.4       Alcohol, Serum     <10       Alkaline Phosphatase     102       ALT     13       Amphetamine Screen, Ur Negative           Anion Gap     11       Appearance, UA   Clear         AST     16       Barbiturate Screen, Ur Negative           Baso #     0.06       Basophil %     0.7       Bilirubin (UA)   Negative         BILIRUBIN TOTAL     0.5  Comment: For infants and newborns, interpretation of results should be based  on gestational age, weight and in agreement with clinical  observations.    Premature Infant recommended reference ranges:  Up to 24 hours.............<8.0 mg/dL  Up to 48 hours............<12.0 mg/dL  3-5 days..................<15.0 mg/dL  6-29 days.................<15.0 mg/dL         BUN     17       Calcium     9.7       Chloride     99       CO2     25       Cocaine (Metab.) Negative           Color, UA   Yellow         Creatinine     0.7       Creatinine, Urine 114.0           Differential Method     Automated       eGFR if      >60.0       eGFR if non      >60.0  Comment: Calculation used to obtain the estimated glomerular filtration  rate (eGFR) is the CKD-EPI equation.          Eos #     0.2       Eosinophil %     2.4       Glucose     134       Glucose, UA   Negative         Gran # (ANC)     6.3       Gran %     73.2       Hematocrit     41.5       Hemoglobin     13.5       Immature Grans (Abs)     0.03  Comment: Mild elevation in immature granulocytes is non specific and   can be seen in a variety  of conditions including stress response,   acute inflammation, trauma and pregnancy. Correlation with other   laboratory and clinical findings is essential.         Immature Granulocytes     0.3       Ketones, UA   Negative         Leukocytes, UA   Negative         Lymph #     1.2       Lymph %     13.6       MCH     28.4       MCHC     32.5       MCV     87       Mono #     0.8       Mono %     9.8       MPV     10.5       NITRITE UA   Negative         nRBC     0       Occult Blood UA   Negative         Opiate Scrn, Ur Negative           pH, UA   6.0         Platelets     246       Potassium     4.0       PROTEIN TOTAL     7.0       Protein, UA   Negative  Comment: Recommend a 24 hour urine protein or a urine   protein/creatinine ratio if globulin induced proteinuria is  clinically suspected.           RBC     4.75       RDW     13.4       SARS-CoV-2 RNA, Amplification, Qual   Negative  Comment: This test utilizes isothermal nucleic acid amplification   technology to detect the SARS-CoV-2 RdRp nucleic acid segment.   The analytical sensitivity (limit of detection) is 125 genome   equivalents/mL.     A POSITIVE result implies infection with the SARS-CoV-2 virus;  the patient is presumed to be contagious.    A NEGATIVE result means that SARS-CoV-2 nucleic acids are not  present above the limit of detection. A NEGATIVE result should be   treated as presumptive. It does not rule out the possibility of   COVID-19 and should not be the sole basis for treatment decisions.   If COVID-19 is strongly suspected based on clinical and exposure   history, re-testing using an alternate molecular assay should be   considered.       This test is only for use under the Food and Drug   Administration s Emergency Use Authorization (EUA).   Commercial kits are provided by [employer].   Performance characteristics of the EUA have been independently  verified by [facility] Department of  Pathology and Laboratory  Medicine.   _________________________________________________________________  The ID NOW COVID-19 Letter of Authorization, along with the   authorized Fact Sheet for Healthcare Providers, the authorized Fact  Sheet for Patients, and authorized labeling are available on the FDA   website:  www.fda.gov/MedicalDevices/Safety/EmergencySituations/eqi784978.htm           Sodium     135       Specific [place], UA   1.020         Specimen UA   Urine, Clean Catch         Marijuana (THC) Metabolite Negative           Toxicology Information SEE COMMENT  Comment: This screen includes the following classes of drugs at the listed   cut-off:    Benzodiazepines 200 ng/ml  Methadone 300 ng/ml  Cocaine metabolite 300 ng/ml  Opiates 300 ng/ml  Barbiturates 200 ng/ml  Amphetamines 1000 ng/ml  Marijuana metabs (THC) 50 ng/ml  Phencyclidine (PCP) 25 ng/ml    This is a screening test. If results do not correlate with clinical   presentation, then a confirmatory send out test is advised.     This report is intended for use in clinical monitoring and management   of   patients. It is not intended for use in employment related drug   testing.             TSH     1.547       WBC     8.58               Significant Imaging: None    Assessment/Plan:     * Manic behavior  ASSESSMENT     Carmina Arcos is a 89 y.o. female with a past psychiatric history of SIPD, who [place] [facility] [place]. Her symptoms of insomnia, hyperactivity, impulsivity, and delusions are likely 2/2 recent course of steroids. However, these symptoms are endangering her and have been difficult for her family to manage, especially due to the delay in seeing an outpatient provider. Pt would benefit from hospital admission to psychiatrically stabilize, reinstate sleep-wake cycle, rule out medical etiologies of current symptoms, and stay safe while substances continue to wash out.    IMPRESSION  Substance-induced psychotic disorder  R/o bipolar  disorder    RECOMMENDATION(S)      1. Scheduled Medication(s):  - Check QTc; if within normal limits, start Seroquel 50 mg PO QHS    2. PRN Medication(s):  - Defer PRNs as patient is currently cooperative and redirectable.    3.  Monitor:  - Please obtain daily EKG to monitor QTc    4. Legal Status/Precaution(s):  - Patient does not meet criteria for PEC at this time. Patient does appear gravely disabled due to a psychiatric illness but is help-seeking and not contesting her admission.      This patient was seen and discussed with Dr. Adorno. Thank you for the consult - Psychiatry will continue to follow. Please reach out with any questions/concerns.           Total Time:  60 minutes      Alba Tyson MD   Psychiatry  [facility]

## 2022-07-24 NOTE — ED NOTES
Pt to remain in gown, not changed into paper scrubs; pt not a code watch at this time per MD Vielka.

## 2022-07-24 NOTE — SUBJECTIVE & OBJECTIVE
Past Medical History:   Diagnosis Date    Anxiety     Breast cancer 2000    right (lumpectomy and radiation)    Broken bones     Colon cancer     COPD (chronic obstructive pulmonary disease)     Depression     depression (no meds now)    Hypertension        Past Surgical History:   Procedure Laterality Date    APPENDECTOMY      BREAST LUMPECTOMY Right     colon operation       COLON SURGERY      HYSTERECTOMY      Pt states still have ovaries     JOINT REPLACEMENT      TONSILLECTOMY         Review of patient's allergies indicates:   Allergen Reactions    Penicillins Hives and Shortness Of Breath       Current Facility-Administered Medications on File Prior to Encounter   Medication    famotidine tablet 20 mg     Current Outpatient Medications on File Prior to Encounter   Medication Sig    amLODIPine (NORVASC) 2.5 MG tablet 1 tab Oral daily (standard)    BEVESPI AEROSPHERE 9-4.8 mcg HFAA Inhale 2 puffs into the lungs 2 (two) times daily.    famotidine (PEPCID) 10 MG tablet Take 10 mg by mouth 2 (two) times daily.    hydrochlorothiazide (HYDRODIURIL) 25 MG tablet Take 25 mg by mouth once daily.    levothyroxine (SYNTHROID) 50 MCG tablet Take 50 mcg by mouth once daily.    losartan (COZAAR) 100 MG tablet      Family History    None       Tobacco Use    Smoking status: Current Every Day Smoker     Types: Cigarettes    Smokeless tobacco: Never Used    Tobacco comment: smoked for 30-35 years per daughter quit many years ago   Substance and Sexual Activity    Alcohol use: Yes     Comment: socially less than one a month    Drug use: No    Sexual activity: Not Currently     Review of Systems   Constitutional:  Negative for activity change, chills, fatigue and fever.   HENT:  Positive for trouble swallowing. Negative for sore throat.    Respiratory:  Negative for apnea, choking and shortness of breath.    Cardiovascular:  Negative for chest pain and palpitations.   Gastrointestinal:  Negative for abdominal pain and anal  bleeding.   Endocrine: Negative for cold intolerance.   Genitourinary:  Negative for difficulty urinating and dysuria.   Musculoskeletal:  Negative for arthralgias and gait problem.   Neurological:  Negative for light-headedness, numbness and headaches.   Psychiatric/Behavioral:  Negative for agitation, behavioral problems, confusion and decreased concentration.    Objective:     Vital Signs (Most Recent):  Temp: 98.7 °F (37.1 °C) (07/24/22 1820)  Pulse: 96 (07/24/22 1812)  Resp: 18 (07/24/22 1010)  BP: 135/74 (07/24/22 1810)  SpO2: 95 % (07/24/22 1812) Vital Signs (24h Range):  Temp:  [98.4 °F (36.9 °C)-98.7 °F (37.1 °C)] 98.7 °F (37.1 °C)  Pulse:  [] 96  Resp:  [18] 18  SpO2:  [95 %] 95 %  BP: (135-177)/(69-85) 135/74     Weight: 80.3 kg (177 lb)  Body mass index is 31.35 kg/m².    Physical Exam  Constitutional:       General: She is not in acute distress.     Appearance: She is not toxic-appearing.   HENT:      Head: Normocephalic and atraumatic.      Nose: Nose normal.      Mouth/Throat:      Mouth: Mucous membranes are moist.      Pharynx: Oropharynx is clear.   Eyes:      Extraocular Movements: Extraocular movements intact.      Conjunctiva/sclera: Conjunctivae normal.      Pupils: Pupils are equal, round, and reactive to light.   Cardiovascular:      Rate and Rhythm: Normal rate and regular rhythm.      Heart sounds: No murmur heard.    No gallop.   Pulmonary:      Effort: Pulmonary effort is normal. No respiratory distress.      Breath sounds: Normal breath sounds.   Abdominal:      General: There is no distension.      Palpations: There is no mass.      Tenderness: There is no abdominal tenderness.      Hernia: No hernia is present.   Musculoskeletal:      Cervical back: Normal range of motion and neck supple. No rigidity.      Right lower leg: No edema.      Left lower leg: No edema.   Skin:     General: Skin is warm.   Neurological:      Mental Status: She is alert and oriented to person, place, and  time.   Psychiatric:      Comments: Grandiose delusions, rapid speech, emotional lability         CRANIAL NERVES     CN III, IV, VI   Pupils are equal, round, and reactive to light.     Significant Labs: All pertinent labs within the past 24 hours have been reviewed.  Recent Lab Results         07/24/22  1344   07/24/22  1043   07/24/22  1036        Benzodiazepines Negative           Methadone metabolites Negative           Phencyclidine Negative           Acetaminophen (Tylenol), Serum     <3.0  Comment: Toxic Levels:  Adults (4 hr post-ingestion).........>150 ug/mL  Adults (12 hr post-ingestion)........>40 ug/mL  Peds (2 hr post-ingestion, liquid)...>225 ug/mL         Albumin     3.4       Alcohol, Serum     <10       Alkaline Phosphatase     102       ALT     13       Amphetamine Screen, Ur Negative           Anion Gap     11       Appearance, UA   Clear         AST     16       Barbiturate Screen, Ur Negative           Baso #     0.06       Basophil %     0.7       Bilirubin (UA)   Negative         BILIRUBIN TOTAL     0.5  Comment: For infants and newborns, interpretation of results should be based  on gestational age, weight and in agreement with clinical  observations.    Premature Infant recommended reference ranges:  Up to 24 hours.............<8.0 mg/dL  Up to 48 hours............<12.0 mg/dL  3-5 days..................<15.0 mg/dL  6-29 days.................<15.0 mg/dL         BUN     17       Calcium     9.7       Chloride     99       CO2     25       Cocaine (Metab.) Negative           Color, UA   Yellow         Creatinine     0.7       Creatinine, Urine 114.0           Differential Method     Automated       eGFR if      >60.0       eGFR if non      >60.0  Comment: Calculation used to obtain the estimated glomerular filtration  rate (eGFR) is the CKD-EPI equation.          Eos #     0.2       Eosinophil %     2.4       Glucose     134       Glucose, UA   Negative          Gran # (ANC)     6.3       Gran %     73.2       Hematocrit     41.5       Hemoglobin     13.5       Immature Grans (Abs)     0.03  Comment: Mild elevation in immature granulocytes is non specific and   can be seen in a variety of conditions including stress response,   acute inflammation, trauma and pregnancy. Correlation with other   laboratory and clinical findings is essential.         Immature Granulocytes     0.3       Ketones, UA   Negative         Leukocytes, UA   Negative         Lymph #     1.2       Lymph %     13.6       MCH     28.4       MCHC     32.5       MCV     87       Mono #     0.8       Mono %     9.8       MPV     10.5       NITRITE UA   Negative         nRBC     0       Occult Blood UA   Negative         Opiate Scrn, Ur Negative           pH, UA   6.0         Platelets     246       Potassium     4.0       PROTEIN TOTAL     7.0       Protein, UA   Negative  Comment: Recommend a 24 hour urine protein or a urine   protein/creatinine ratio if globulin induced proteinuria is  clinically suspected.           RBC     4.75       RDW     13.4       SARS-CoV-2 RNA, Amplification, Qual   Negative  Comment: This test utilizes isothermal nucleic acid amplification   technology to detect the SARS-CoV-2 RdRp nucleic acid segment.   The analytical sensitivity (limit of detection) is 125 genome   equivalents/mL.     A POSITIVE result implies infection with the SARS-CoV-2 virus;  the patient is presumed to be contagious.    A NEGATIVE result means that SARS-CoV-2 nucleic acids are not  present above the limit of detection. A NEGATIVE result should be   treated as presumptive. It does not rule out the possibility of   COVID-19 and should not be the sole basis for treatment decisions.   If COVID-19 is strongly suspected based on clinical and exposure   history, re-testing using an alternate molecular assay should be   considered.       This test is only for use under the Food and Drug   Administration s  Emergency Use Authorization (EUA).   Commercial kits are provided by [employer].   Performance characteristics of the EUA have been independently  verified by [facility] Department of  Pathology and Laboratory Medicine.   _________________________________________________________________  The ID NOW COVID-19 Letter of Authorization, along with the   authorized Fact Sheet for Healthcare Providers, the authorized Fact  Sheet for Patients, and authorized labeling are available on the FDA   website:  www.fda.gov/MedicalDevices/Safety/EmergencySituations/nqj247679.htm           Sodium     135       Specific [place], UA   1.020         Specimen UA   Urine, Clean Catch         Marijuana (THC) Metabolite Negative           Toxicology Information SEE COMMENT  Comment: This screen includes the following classes of drugs at the listed   cut-off:    Benzodiazepines 200 ng/ml  Methadone 300 ng/ml  Cocaine metabolite 300 ng/ml  Opiates 300 ng/ml  Barbiturates 200 ng/ml  Amphetamines 1000 ng/ml  Marijuana metabs (THC) 50 ng/ml  Phencyclidine (PCP) 25 ng/ml    This is a screening test. If results do not correlate with clinical   presentation, then a confirmatory send out test is advised.     This report is intended for use in clinical monitoring and management   of   patients. It is not intended for use in employment related drug   testing.             TSH     1.547       WBC     8.58               Significant Imaging: I have reviewed all pertinent imaging results/findings within the past 24 hours.

## 2022-07-24 NOTE — ASSESSMENT & PLAN NOTE
ASSESSMENT     Carmina Arcos is a 89 y.o. female with a past psychiatric history of SIPD, who presented to the [facility] due to manic behavior. Her symptoms of insomnia, hyperactivity, impulsivity, and delusions are likely 2/2 recent course of steroids. However, these symptoms are endangering her and have been difficult for her family to manage, especially due to the delay in seeing an outpatient provider. Pt would benefit from hospital admission to psychiatrically stabilize, reinstate sleep-wake cycle, rule out medical etiologies of current symptoms, and stay safe while substances continue to wash out.    IMPRESSION  Substance-induced psychotic disorder  R/o bipolar disorder    RECOMMENDATION(S)      1. Scheduled Medication(s):  - Check QTc; if within normal limits, start Seroquel 50 mg PO QHS    2. PRN Medication(s):  - Defer PRNs as patient is currently cooperative and redirectable.    3.  Monitor:  - Please obtain daily EKG to monitor QTc    4. Legal Status/Precaution(s):  - Patient does not meet criteria for PEC at this time. Patient does appear gravely disabled due to a psychiatric illness but is help-seeking and not contesting her admission.      This patient was seen and discussed with Dr. Adorno. Thank you for the consult - Psychiatry will continue to follow. Please reach out with any questions/concerns.

## 2022-07-24 NOTE — HPI
"History provided mostly by daughter Nimco. Ms Grewal is a 89 yo that  according to daughter after having a 3-4 weeks ago COPD exacerbation, was started on Prednisone by her son who is a nurse. States  that after a few days she started noticing behavioral changes described as flight of ideas, insomnia, rapid speech, alternating irritability with excitability, grandiose delusions auditory hallucinations, fixation with allegories, numerology and Anabaptism.  Daughter also affirms, reckless spending. She was taken to [facility] but there was no psych service available. They decided to come to [facility] [place] for psychiatric evaluation.  States previous episode precipitated by "diet pills " in 1999 and had to be committed to [facility] for a week. There she was started on risperidone, its unclear for how long she was on it. Daughter states that the patient also has a history of depression with ne suicide attempt.    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 She currently was living on her own and managed rental property, her family lives close to her home and visit regularly.    PMH: Breast Cancer? Colon Cancer. HTN treated with losartan hydrochlorothiazide and amlodipine, Hypothyroidism treated with levothyroxine, COPD treated with with formoterol-glycopyrrolate , albuterol PRN and Sildenafil given by his son.    Surgical hx positive for bilateral knee replacement, tonsillectomy, hysterectomy, right lumpectomy and colon surgery .    Allergies: Penicillin  "

## 2022-07-24 NOTE — SUBJECTIVE & OBJECTIVE
Patient History               Medical as of 7/24/2022       Past Medical History       Diagnosis Date Comments Source    Anxiety -- -- Provider    Breast cancer 2000 right (lumpectomy and radiation) Provider    Broken bones -- -- Provider    Colon cancer -- -- Provider    COPD (chronic obstructive pulmonary disease) -- -- Provider    Depression -- depression (no meds now) Provider    Hypertension -- -- Provider                          Surgical as of 7/24/2022       Past Surgical History       Procedure Laterality Date Comments Source    HYSTERECTOMY -- -- Pt states still have ovaries  Provider    APPENDECTOMY -- -- -- Provider    TONSILLECTOMY -- -- -- Provider    BREAST LUMPECTOMY Right -- -- Provider    colon operation  [Other] -- -- -- Provider                          Family as of 7/24/2022       Problem Relation Name Age of Onset Comments Source    Breast cancer Neg Hx -- -- -- Provider    Colon cancer Neg Hx -- -- -- Provider    Ovarian cancer Neg Hx -- -- -- Provider                  Tobacco Use as of 7/24/2022       Smoking Status Smoking Start Date Smoking Quit Date Packs/Day Years Used    Never Smoker -- -- -- --      Types Comments Smokeless Tobacco Status Smokeless Tobacco Quit Date Source    -- -- Never Used -- Provider                  Alcohol Use as of 7/24/2022       Alcohol Use Drinks/Week Alcohol/Week Comments Source    No   -- -- Provider                  Drug Use as of 7/24/2022       Drug Use Types Frequency Comments Source    No -- -- -- Provider                  Sexual Activity as of 7/24/2022       Sexually Active Birth Control Partners Comments Source    Never -- -- -- Provider                  Activities of Daily Living as of 7/24/2022    None               Social Documentation as of 7/24/2022    None               Occupational as of 7/24/2022    None               Socioeconomic as of 7/24/2022       Marital Status Spouse Name Number of Children Years Education Education Level  Preferred Language Ethnicity Race Source     -- -- -- -- English Not  or /a White --                  Pertinent History       Question Response Comments    Lives with -- --    Place in Birth Order -- --    Lives in -- --    Number of Siblings -- --    Raised by -- --    Legal Involvement -- --    Childhood Trauma -- --    Criminal History of -- --    Financial Status -- --    Highest Level of Education -- --    Does patient have access to a firearm? -- --     Service -- --    Primary Leisure Activity -- --    Spirituality -- --          Past Medical History:   Diagnosis Date    Anxiety     Breast cancer 2000    right (lumpectomy and radiation)    Broken bones     Colon cancer     COPD (chronic obstructive pulmonary disease)     Depression     depression (no meds now)    Hypertension      Past Surgical History:   Procedure Laterality Date    APPENDECTOMY      BREAST LUMPECTOMY Right     colon operation       HYSTERECTOMY      Pt states still have ovaries     TONSILLECTOMY       Family History    None       Tobacco Use    Smoking status: Never Smoker    Smokeless tobacco: Never Used   Substance and Sexual Activity    Alcohol use: No    Drug use: No    Sexual activity: Never     Review of patient's allergies indicates:   Allergen Reactions    Penicillins Hives and Shortness Of Breath       Current Facility-Administered Medications on File Prior to Encounter   Medication    famotidine tablet 20 mg     Current Outpatient Medications on File Prior to Encounter   Medication Sig    amLODIPine (NORVASC) 2.5 MG tablet 1 tab Oral daily (standard)    BEVESPI AEROSPHERE 9-4.8 mcg HFAA Inhale 2 puffs into the lungs 2 (two) times daily.    famotidine (PEPCID) 10 MG tablet Take 10 mg by mouth 2 (two) times daily.    hydrochlorothiazide (HYDRODIURIL) 25 MG tablet Take 25 mg by mouth once daily.    levothyroxine (SYNTHROID) 50 MCG tablet Take 50 mcg by mouth once daily.    losartan (COZAAR) 100 MG tablet  "     Psychotherapeutics (From admission, onward)                None            Strengths and Liabilities: Strength: Patient is intelligent., Strength: Patient has positive support network., Strength: Patient has reasonable judgment., Liability: Patient is impulsive.    Objective:     Vital Signs (Most Recent):  Temp: 98.4 °F (36.9 °C) (07/24/22 1010)  Pulse: 104 (07/24/22 1317)  Resp: 18 (07/24/22 1010)  BP: (!) 177/85 (07/24/22 1315)  SpO2: 95 % (07/24/22 1317)   Vital Signs (24h Range):  Temp:  [98.4 °F (36.9 °C)] 98.4 °F (36.9 °C)  Pulse:  [] 104  Resp:  [18] 18  SpO2:  [95 %] 95 %  BP: (142-177)/(69-85) 177/85     Height: 5' 3" (160 cm)  Weight: 80.3 kg (177 lb)  Body mass index is 31.35 kg/m².    No intake or output data in the 24 hours ending 07/24/22 1654    MSE:  Orientation: oriented to person, place, time, situation  Speech: rapid, pressured, interruptive  Language: fluent  Mood: anxious  Affect: elevated, labile, irritable  Thought Process: racing, FOI, tangential  Associations: loose  Thought Content: No SI/HI/AVH. +delusions, grandiose, hyperreligious  Memory: Recent intact, Remote intact  Attention and Concentration: Distractible  Fund of Knowledge: Appropriate for education level  Insight: Good  Judgment: Poor    Significant Labs: Last 24 Hours:   Recent Lab Results         07/24/22  1344   07/24/22  1043   07/24/22  1036        Benzodiazepines Negative           Methadone metabolites Negative           Phencyclidine Negative           Acetaminophen (Tylenol), Serum     <3.0  Comment: Toxic Levels:  Adults (4 hr post-ingestion).........>150 ug/mL  Adults (12 hr post-ingestion)........>40 ug/mL  Peds (2 hr post-ingestion, liquid)...>225 ug/mL         Albumin     3.4       Alcohol, Serum     <10       Alkaline Phosphatase     102       ALT     13       Amphetamine Screen, Ur Negative           Anion Gap     11       Appearance, UA   Clear         AST     16       Barbiturate Screen, Ur Negative   "         Baso #     0.06       Basophil %     0.7       Bilirubin (UA)   Negative         BILIRUBIN TOTAL     0.5  Comment: For infants and newborns, interpretation of results should be based  on gestational age, weight and in agreement with clinical  observations.    Premature Infant recommended reference ranges:  Up to 24 hours.............<8.0 mg/dL  Up to 48 hours............<12.0 mg/dL  3-5 days..................<15.0 mg/dL  6-29 days.................<15.0 mg/dL         BUN     17       Calcium     9.7       Chloride     99       CO2     25       Cocaine (Metab.) Negative           Color, UA   Yellow         Creatinine     0.7       Creatinine, Urine 114.0           Differential Method     Automated       eGFR if      >60.0       eGFR if non      >60.0  Comment: Calculation used to obtain the estimated glomerular filtration  rate (eGFR) is the CKD-EPI equation.          Eos #     0.2       Eosinophil %     2.4       Glucose     134       Glucose, UA   Negative         Gran # (ANC)     6.3       Gran %     73.2       Hematocrit     41.5       Hemoglobin     13.5       Immature Grans (Abs)     0.03  Comment: Mild elevation in immature granulocytes is non specific and   can be seen in a variety of conditions including stress response,   acute inflammation, trauma and pregnancy. Correlation with other   laboratory and clinical findings is essential.         Immature Granulocytes     0.3       Ketones, UA   Negative         Leukocytes, UA   Negative         Lymph #     1.2       Lymph %     13.6       MCH     28.4       MCHC     32.5       MCV     87       Mono #     0.8       Mono %     9.8       MPV     10.5       NITRITE UA   Negative         nRBC     0       Occult Blood UA   Negative         Opiate Scrn, Ur Negative           pH, UA   6.0         Platelets     246       Potassium     4.0       PROTEIN TOTAL     7.0       Protein, UA   Negative  Comment: Recommend a 24 hour urine  protein or a urine   protein/creatinine ratio if globulin induced proteinuria is  clinically suspected.           RBC     4.75       RDW     13.4       SARS-CoV-2 RNA, Amplification, Qual   Negative  Comment: This test utilizes isothermal nucleic acid amplification   technology to detect the SARS-CoV-2 RdRp nucleic acid segment.   The analytical sensitivity (limit of detection) is 125 genome   equivalents/mL.     A POSITIVE result implies infection with the SARS-CoV-2 virus;  the patient is presumed to be contagious.    A NEGATIVE result means that SARS-CoV-2 nucleic acids are not  present above the limit of detection. A NEGATIVE result should be   treated as presumptive. It does not rule out the possibility of   COVID-19 and should not be the sole basis for treatment decisions.   If COVID-19 is strongly suspected based on clinical and exposure   history, re-testing using an alternate molecular assay should be   considered.       This test is only for use under the Food and Drug   Administration s Emergency Use Authorization (EUA).   Commercial kits are provided by [employer].   Performance characteristics of the EUA have been independently  verified by [facility] Department of  Pathology and Laboratory Medicine.   _________________________________________________________________  The ID NOW COVID-19 Letter of Authorization, along with the   authorized Fact Sheet for Healthcare Providers, the authorized Fact  Sheet for Patients, and authorized labeling are available on the FDA   website:  www.fda.gov/MedicalDevices/Safety/EmergencySituations/vep202382.htm           Sodium     135       Specific [place], UA   1.020         Specimen UA   Urine, Clean Catch         Marijuana (THC) Metabolite Negative           Toxicology Information SEE COMMENT  Comment: This screen includes the following classes of drugs at the listed   cut-off:    Benzodiazepines 200 ng/ml  Methadone 300 ng/ml  Cocaine metabolite  300 ng/ml  Opiates 300 ng/ml  Barbiturates 200 ng/ml  Amphetamines 1000 ng/ml  Marijuana metabs (THC) 50 ng/ml  Phencyclidine (PCP) 25 ng/ml    This is a screening test. If results do not correlate with clinical   presentation, then a confirmatory send out test is advised.     This report is intended for use in clinical monitoring and management   of   patients. It is not intended for use in employment related drug   testing.             TSH     1.547       WBC     8.58               Significant Imaging: None

## 2022-07-24 NOTE — ED PROVIDER NOTES
Encounter Date: 7/24/2022       History     Chief Complaint   Patient presents with    Multiple complaints     Daughter want psych eval, not sleeping, took sons prednisone     89 yo W with pmhx COPD, anxiety, depression, HTN, breast CA presents with family for psych evaluation.  History is assisted by the patient and the patient's daughter.  They note that patient was having coughing flares of COPD 3 weeks ago and she was started on course of prednisone by a family member.  She was on the prednisone for almost 2 weeks.  The prednisone has since discontinued that she continues to have manic symptoms including insomnia, reckless spending behavior, typical references Septra.  She was seen in the emergency department several days ago and discharged home with close observation by family members and outpatient psychiatry follow-up.  That follow-up is in 6 days.  Family states that she will need a sooner psychiatric evaluation.  She did have history of manic episode many years ago in the setting of taking a diet pill.  She was admitted inpatient time.  The patient does report some foul-smelling odor in her urine and is concerned that she has a kidney infection.  Otherwise she is essentially without complaints.    The history is provided by the patient and a relative. The history is limited by the condition of the patient. No  was used.     Review of patient's allergies indicates:   Allergen Reactions    Penicillins Hives and Shortness Of Breath     Past Medical History:   Diagnosis Date    Anxiety     Breast cancer 2000    right (lumpectomy and radiation)    Broken bones     Colon cancer     COPD (chronic obstructive pulmonary disease)     Depression     depression (no meds now)    Hypertension      Past Surgical History:   Procedure Laterality Date    APPENDECTOMY      BREAST LUMPECTOMY Right     colon operation       COLON SURGERY      HYSTERECTOMY      Pt states still have ovaries      JOINT REPLACEMENT      TONSILLECTOMY       Family History   Problem Relation Age of Onset    Breast cancer Neg Hx     Colon cancer Neg Hx     Ovarian cancer Neg Hx      Social History     Tobacco Use    Smoking status: Current Every Day Smoker     Types: Cigarettes    Smokeless tobacco: Never Used    Tobacco comment: smoked for 30-35 years per daughter quit many years ago   Substance Use Topics    Alcohol use: Yes     Comment: socially less than one a month    Drug use: No     Review of Systems   Constitutional: Negative for chills and fever.   HENT: Negative for rhinorrhea and sore throat.    Respiratory: Negative for cough and shortness of breath.    Cardiovascular: Negative for chest pain and leg swelling.   Gastrointestinal: Negative for abdominal pain, diarrhea, nausea and vomiting.   Genitourinary: Negative for dysuria and hematuria.   Musculoskeletal: Negative for back pain and neck pain.   Skin: Negative for rash and wound.   Neurological: Negative for seizures and headaches.   Psychiatric/Behavioral: Positive for agitation and sleep disturbance. Negative for behavioral problems. The patient is hyperactive.        Physical Exam     Initial Vitals [07/24/22 1010]   BP Pulse Resp Temp SpO2   (!) 142/69 92 18 98.4 °F (36.9 °C) 95 %      MAP       --         Physical Exam    Nursing note and vitals reviewed.  Constitutional: She appears well-developed and well-nourished.   HENT:   Head: Normocephalic and atraumatic.   Eyes: Conjunctivae and EOM are normal.   Neck: Neck supple.   Normal range of motion.  Cardiovascular: Normal rate, regular rhythm, normal heart sounds and intact distal pulses.   Pulmonary/Chest: No respiratory distress. She has no wheezes. She has no rhonchi. She has no rales.   Abdominal: Abdomen is soft. Bowel sounds are normal. She exhibits no distension. There is no abdominal tenderness. There is no rebound and no guarding.   Musculoskeletal:         General: No tenderness or edema.  Normal range of motion.      Cervical back: Normal range of motion and neck supple.     Neurological: She is alert and oriented to person, place, and time. She has normal strength.   Skin: Skin is warm and dry.   Psychiatric:   Pressured tangential speech, labile mood, cooperative, has some insight, she is redirectable         ED Course   Procedures  Labs Reviewed   CBC W/ AUTO DIFFERENTIAL - Abnormal; Notable for the following components:       Result Value    Gran % 73.2 (*)     Lymph % 13.6 (*)     All other components within normal limits   COMPREHENSIVE METABOLIC PANEL - Abnormal; Notable for the following components:    Sodium 135 (*)     Glucose 134 (*)     Albumin 3.4 (*)     All other components within normal limits   ACETAMINOPHEN LEVEL - Abnormal; Notable for the following components:    Acetaminophen (Tylenol), Serum <3.0 (*)     All other components within normal limits   TSH   URINALYSIS, REFLEX TO URINE CULTURE    Narrative:     Specimen Source->Urine   DRUG SCREEN PANEL, URINE EMERGENCY    Narrative:     Specimen Source->Urine   ALCOHOL,MEDICAL (ETHANOL)   SARS-COV-2 RNA AMPLIFICATION, QUAL    Narrative:     Is the patient symptomatic?->No  Is testing needed for patient travel?->No  Is this needed for pre-procedure or pre-op testing?->No          Imaging Results    None          Medications   dextrose 10% bolus 125 mL (has no administration in time range)   dextrose 10% bolus 250 mL (has no administration in time range)   sodium chloride 0.9% flush 10 mL (has no administration in time range)   naloxone 0.4 mg/mL injection 0.02 mg (has no administration in time range)   glucose chewable tablet 16 g (has no administration in time range)   glucose chewable tablet 24 g (has no administration in time range)   glucagon (human recombinant) injection 1 mg (has no administration in time range)   dextrose 10% bolus 125 mL (has no administration in time range)   dextrose 10% bolus 250 mL (has no administration  in time range)   enoxaparin injection 40 mg (0 mg Subcutaneous Hold 7/24/22 1700)   amLODIPine tablet 2.5 mg (2.5 mg Oral Given 7/24/22 1522)   famotidine tablet 20 mg (20 mg Oral Given 7/24/22 1521)   hydroCHLOROthiazide tablet 25 mg (25 mg Oral Given 7/24/22 1522)   levothyroxine tablet 50 mcg (50 mcg Oral Not Given 7/25/22 0600)   albuterol-ipratropium 2.5 mg-0.5 mg/3 mL nebulizer solution 3 mL (has no administration in time range)   QUEtiapine tablet 50 mg (50 mg Oral Given 7/24/22 2112)   methocarbamoL tablet 1,000 mg (1,000 mg Oral Given 7/24/22 2330)     Medical Decision Making:   Initial Assessment:   89 yo W with pmhx COPD, anxiety, depression, HTN, breast CA presents with family for psych evaluation.  History of manic episode requiring inpatient admission over 20 years ago.  Thought to be induced by stimulant, diet pill at that time.  Patient was recently on prednisone and is presenting with another acute manic episode.  She was already discharged from the emergency department with family several days ago but symptoms have not improved.   Differential Diagnosis:   Acute sujata, medication side effect, UTI, doubt bipolar disorder as a new diagnosis in this 89-year-old female  ED Management:  Consult to Psychiatry placed.  We both felt that hospital medicine admission for delirium would be most appropriate in this elderly 89-year-old.  Psych will be following. Her labs have been unremarkable.  She remains stable while in the emergency department.              Attending Attestation:   Physician Attestation Statement for Resident:  As the supervising MD   Physician Attestation Statement: I have personally seen and examined this patient.   I agree with the above history. -:   As the supervising MD I agree with the above PE.    As the supervising MD I agree with the above treatment, course, plan, and disposition.            Attending ED Notes:   89y F with worsening AMS. Likely steroid induced sujata, delirium.  Unlikely psych given her lack of history. Admit to       ED Course as of 07/25/22 0730   Sun Jul 24, 2022   1120 89y F with increasingly worse AMS. Remote history of inpatient psych (23years) Concern for sujata v delirium. Given age feel that patient would benefit from medical admission and stabilization  [HS]   1201 Patient and family were updated on plan for hospital medicine admission with psychiatry following.  Psychiatry will that the patient in the ED as soon as possible. [KL]   1203 COVID negative. [KL]   1203 Urinalysis within normal limits.  Contaminated with 7 squamous epithelial cells.  Only 1 white blood cell. [KL]   1206 CBC unremarkable. [KL]   1231 CMP within normal limits.  Acetaminophen, ethanol and drugs with use negative.  [KL]      ED Course User Index  [HS] Autumn Caballero MD  [KL] Marysol Vora MD             Clinical Impression:   Final diagnoses:  [R41.82] Altered mental status, unspecified altered mental status type (Primary)  [F30.10] Manic behavior  [I10] Hypertension, unspecified type  [R41.0] Delirium          ED Disposition Condition    Observation               Marysol Vora MD  Resident  07/24/22 3110       Autumn Caballero MD  07/25/22 0717

## 2022-07-24 NOTE — ASSESSMENT & PLAN NOTE
Psychotic episode probably precipitated by prednisone which has been stopped.  -Psychiatry has evaluated the patient and will follow closely.  Seroquel is indicate qs

## 2022-07-25 PROCEDURE — 96372 THER/PROPH/DIAG INJ SC/IM: CPT | Performed by: STUDENT IN AN ORGANIZED HEALTH CARE EDUCATION/TRAINING PROGRAM

## 2022-07-25 PROCEDURE — 97161 PT EVAL LOW COMPLEX 20 MIN: CPT

## 2022-07-25 PROCEDURE — G0378 HOSPITAL OBSERVATION PER HR: HCPCS

## 2022-07-25 PROCEDURE — 99226 PR SUBSEQUENT OBSERVATION CARE,LEVEL III: ICD-10-PCS | Mod: GC,,, | Performed by: HOSPITALIST

## 2022-07-25 PROCEDURE — 97530 THERAPEUTIC ACTIVITIES: CPT

## 2022-07-25 PROCEDURE — 99226 PR SUBSEQUENT OBSERVATION CARE,LEVEL III: CPT | Mod: GC,,, | Performed by: HOSPITALIST

## 2022-07-25 PROCEDURE — 25000003 PHARM REV CODE 250: Performed by: STUDENT IN AN ORGANIZED HEALTH CARE EDUCATION/TRAINING PROGRAM

## 2022-07-25 PROCEDURE — 97116 GAIT TRAINING THERAPY: CPT

## 2022-07-25 PROCEDURE — 63600175 PHARM REV CODE 636 W HCPCS: Performed by: STUDENT IN AN ORGANIZED HEALTH CARE EDUCATION/TRAINING PROGRAM

## 2022-07-25 RX ORDER — RISPERIDONE 0.5 MG/1
0.5 TABLET ORAL EVERY 8 HOURS PRN
Status: DISCONTINUED | OUTPATIENT
Start: 2022-07-25 | End: 2022-07-27

## 2022-07-25 RX ORDER — RISPERIDONE 0.5 MG/1
0.5 TABLET ORAL NIGHTLY
Status: DISCONTINUED | OUTPATIENT
Start: 2022-07-25 | End: 2022-07-26

## 2022-07-25 RX ORDER — QUETIAPINE FUMARATE 25 MG/1
50 TABLET, FILM COATED ORAL NIGHTLY PRN
Status: DISCONTINUED | OUTPATIENT
Start: 2022-07-25 | End: 2022-07-25

## 2022-07-25 RX ORDER — HYDROCHLOROTHIAZIDE 12.5 MG/1
12.5 CAPSULE ORAL DAILY
Status: ON HOLD | COMMUNITY
Start: 2022-05-03 | End: 2022-07-26

## 2022-07-25 RX ORDER — LEVOTHYROXINE SODIUM 50 UG/1
50 TABLET ORAL
Status: DISCONTINUED | OUTPATIENT
Start: 2022-07-26 | End: 2022-08-01

## 2022-07-25 RX ORDER — ALBUTEROL SULFATE 90 UG/1
2 AEROSOL, METERED RESPIRATORY (INHALATION) 2 TIMES DAILY PRN
COMMUNITY
Start: 2022-07-08

## 2022-07-25 RX ORDER — TALC
6 POWDER (GRAM) TOPICAL NIGHTLY
Status: DISCONTINUED | OUTPATIENT
Start: 2022-07-25 | End: 2022-07-27

## 2022-07-25 RX ORDER — ACETAMINOPHEN 325 MG/1
650 TABLET ORAL EVERY 6 HOURS PRN
Status: DISCONTINUED | OUTPATIENT
Start: 2022-07-25 | End: 2022-08-09 | Stop reason: HOSPADM

## 2022-07-25 RX ADMIN — RISPERIDONE 0.5 MG: 0.5 TABLET ORAL at 08:07

## 2022-07-25 RX ADMIN — ENOXAPARIN SODIUM 40 MG: 40 INJECTION SUBCUTANEOUS at 04:07

## 2022-07-25 RX ADMIN — ACETAMINOPHEN 650 MG: 325 TABLET ORAL at 02:07

## 2022-07-25 RX ADMIN — ACETAMINOPHEN 650 MG: 325 TABLET ORAL at 07:07

## 2022-07-25 RX ADMIN — Medication 6 MG: at 08:07

## 2022-07-25 NOTE — PLAN OF CARE
[facility] Med Surg  Initial Discharge Assessment       Primary Care Provider: Dick Graham MD    Admission Diagnosis: Mood disorder due to a general medical condition [F06.30]  Delirium [R41.0]  Chest pain [R07.9]  Manic behavior [F30.10]  Altered mental status, unspecified altered mental status type [R41.82]  Hypertension, unspecified type [I10]  At risk for prolonged QT interval syndrome [Z91.89]    Admission Date: 7/24/2022  Expected Discharge Date: 7/26/2022    Discharge Barriers Identified: None    Payor: [employer] MEDICARE / Plan: [employer] MEDICARE HMO / Product Type: Capitation /     Extended Emergency Contact Information  Primary Emergency Contact: Pura Francois  Address: [address] [place] [address] [place] [address] [facility]  Home Phone: [phone]  Mobile Phone: [phone]  Relation: Daughter    Discharge Plan A: Home with family  Discharge Plan B: Home with family      [facility] [place]  [address] [place]  [address] [place] [address]  Phone: [phone] Fax: [phone]      Initial Assessment (most recent)     Adult Discharge Assessment - 07/25/22 1153        Discharge Assessment    Assessment Type Discharge Planning Assessment     Confirmed/corrected address, phone number and insurance Yes     Confirmed Demographics Correct on Facesheet     Source of Information patient;family     Does patient/caregiver understand observation status Yes     Communicated ANA with patient/caregiver Yes     Reason For Admission drug-induced delirium     Lives With alone     Do you expect to return to your current living situation? Yes     Do you have help at home or someone to help you manage your care at home? Yes     Who are your caregiver(s) and their phone number(s)? pura francois(daughter) [phone]     Prior to hospitilization cognitive status: Unable to Assess     Current cognitive status: Alert/Oriented     Walking or Climbing Stairs Difficulty  ambulation difficulty, requires equipment     Mobility Management uses a cane     Dressing/Bathing Difficulty none     Home Layout Able to live on 1st floor     Equipment Currently Used at Home none     Readmission within 30 days? Yes     Patient currently being followed by outpatient case management? No     Do you currently have service(s) that help you manage your care at home? No     Do you take prescription medications? Yes     Do you have prescription coverage? Yes     Coverage [employer] managed medicare     Do you have any problems affording any of your prescribed medications? No     Is the patient taking medications as prescribed? yes     Who is going to help you get home at discharge? apple cornelius(daughter) [phone]     How do you get to doctors appointments? family or friend will provide     Are you on dialysis? No     Do you take coumadin? No     Discharge Plan A Home with family     Discharge Plan B Home with family     DME Needed Upon Discharge  none     Discharge Plan discussed with: Patient;Adult children     Discharge Barriers Identified None                 Readmission Assessment (most recent)     Readmission Assessment - 07/25/22 1155        Readmission    Why were you hospitalized in the last 30 days? manic behavior     Why were you readmitted? Related to previous admission     When you left the hospital where did you go? Home with Family     Was this a planned readmission? No                Cm spoke to patient and her son in the room.  She lives alone at the address on the facesheet.  No home health. No home o2.  Has a cane.  No dme needs at this time.  Children can take patient home upon dc.     DCP: home    Khadra Robison RN [facility] [phone]

## 2022-07-25 NOTE — PROGRESS NOTES
"[facility] Medicine  Progress Note    Patient Name: Carmina Arcos  MRN: [id number]  Patient Class: OP- Observation   Admission Date: 7/24/2022  Length of Stay: 0 days  Attending Physician: Susie Wynne MD  Primary Care Provider: Dick Graham MD        Subjective:     Principal Problem:Drug-induced delirium        HPI:  History provided mostly by daughter Nimco. Ms Grewal is a 89 yo that  according to daughter after having a 3-4 weeks ago COPD exacerbation, was started on Prednisone by her son who is a nurse. States  that after a few days she started noticing behavioral changes described as flight of ideas, insomnia, rapid speech, alternating irritability with excitability, grandiose delusions auditory hallucinations, fixation with allegories, numerology and Uatsdin.  Daughter also affirms, reckless spending. She was taken to [facility] but there was no psych service available. They decided to come to [facility] for psychiatric evaluation.  States previous episode precipitated by "diet pills " in 1999 and had to be committed to [facility] for a week. There she was started on risperidone, its unclear for how long she was on it. Daughter states that the patient also has a history of depression with ne suicide attempt.    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 She currently was living on her own and managed rental property, her family lives close to her home and visit regularly.    PMH: Breast Cancer? Colon Cancer. HTN treated with losartan hydrochlorothiazide and amlodipine, Hypothyroidism treated with levothyroxine, COPD treated with with formoterol-glycopyrrolate , albuterol PRN and Sildenafil given by his son.    Surgical hx positive for bilateral knee replacement, tonsillectomy, hysterectomy, right lumpectomy and colon surgery .    Allergies: Penicillin      Overview/Hospital Course:  No notes on file    Interval " History:    No acute events over night, patient requested her nebulizer. Affirms that she slept. Continues with pressured speech and flight of ideas.    Review of Systems  Objective:     Vital Signs (Most Recent):  Temp: 98.8 °F (37.1 °C) (07/25/22 1637)  Pulse: 95 (07/25/22 1637)  Resp: 17 (07/25/22 1637)  BP: 117/62 (07/25/22 1637)  SpO2: (!) 90 % (07/25/22 1637)   Vital Signs (24h Range):  Temp:  [98 °F (36.7 °C)-98.8 °F (37.1 °C)] 98.8 °F (37.1 °C)  Pulse:  [] 95  Resp:  [16-20] 17  SpO2:  [88 %-95 %] 90 %  BP: (115-135)/(59-90) 117/62     Weight: 80.3 kg (177 lb)  Body mass index is 31.35 kg/m².  No intake or output data in the 24 hours ending 07/25/22 1704   Physical Exam  HENT:      Head: Normocephalic and atraumatic.      Right Ear: External ear normal.      Left Ear: External ear normal.      Nose: Nose normal.      Mouth/Throat:      Mouth: Mucous membranes are moist.      Pharynx: Oropharynx is clear.   Eyes:      General: No scleral icterus.     Extraocular Movements: Extraocular movements intact.      Conjunctiva/sclera: Conjunctivae normal.   Cardiovascular:      Rate and Rhythm: Normal rate and regular rhythm.      Heart sounds: No murmur heard.  Pulmonary:      Effort: Pulmonary effort is normal.      Breath sounds: Normal breath sounds. No wheezing or rales.   Abdominal:      General: Abdomen is flat. Bowel sounds are normal. There is no distension.      Palpations: Abdomen is soft.      Tenderness: There is no abdominal tenderness.   Musculoskeletal:         General: No swelling. Normal range of motion.      Cervical back: Normal range of motion.   Skin:     General: Skin is warm and dry.   Neurological:      General: No focal deficit present.      Mental Status: She is alert.   Psychiatric:      Comments: Flight of ideas,delusions, pressured speech       Significant Labs: All pertinent labs within the past 24 hours have been reviewed.  Recent Lab Results       None            Significant  Imaging: I have reviewed all pertinent imaging results/findings within the past 24 hours.      Assessment/Plan:      * Drug-induced delirium  Ms. Arcos is a 89 yo that  according to daughter after having a 3-4 weeks ago COPD exacerbation, was started on Prednisone by her son who is a nurse. States  that after a few days she started noticing behavioral changes described as flight of ideas, insomnia, rapid speech, alternating irritability with excitability, grandiose delusions auditory hallucinations, fixation with allegories, numerology and Buddhism.     Differential includes but not limited to drug induced delirium like from recent steroids use vs manic episode from mood disorder. Unlikely to be 2/2 to infection given normal WBC and lacks of infectious symptoms. UA is also negative. UDS is also negative. TSH is normal, low likely moran for thyroid condition     - Psychiatry recommends changing seroquel to risperidal at night. 7/25  - Continue to monitor mood and mental status   - Delirium precautions   - Holding further steroids       COPD exacerbation  - Not in exacerbation at this time   - Duo Neb PRN     Mood disorder due to a general medical condition  See drug induced delirium     Sensorineural hearing loss (SNHL) of both ears        Hypertension  Home regimen: amlodipine 2.5 mg QD, HCTZ 25 mg QD, Losartan 100 mg QD     Plan:   - Resume home regimen       Hx of breast cancer  S/p right lumpectomy and radiation       VTE Risk Mitigation (From admission, onward)         Ordered     enoxaparin injection 40 mg  Daily         07/24/22 1332     IP VTE HIGH RISK PATIENT  Once         07/24/22 1332     Place sequential compression device  Until discontinued         07/24/22 1332                Discharge Planning   ANA: 7/26/2022     Code Status: Full Code   Is the patient medically ready for discharge?: No    Reason for patient still in hospital (select all that apply): Patient trending condition  Discharge Plan A:  Home with family                  Gadiel Grajeda MD  Department of Hospital Medicine   [facility]

## 2022-07-25 NOTE — PLAN OF CARE
Problem: Physical Therapy  Goal: Physical Therapy Goal  Description: Goals to be met by: 2022     Patient will increase functional independence with mobility by performin. Supine to sit with Modified Person.  2. Sit to supine with Modified Person.  3. Sit to stand transfer with Modified Person using Single- point Cane.  4. Bed to chair transfer with Modified Person using Single-point Cane   5. Gait  x 200 feet with Modified Person using Single-point Cane .     Outcome: Ongoing, Progressing     Pt evaluated and appropriate goals established for patient.

## 2022-07-25 NOTE — PT/OT/SLP PROGRESS
Occupational Therapy      Patient Name:  Carmina Arcos   MRN:  [id number]     Patient not seen today secondary to pt extremely confused and not appropriate for eval at this time.Will follow-up tomorrow.    7/25/2022

## 2022-07-25 NOTE — ASSESSMENT & PLAN NOTE
ASSESSMENT     Carmina Arcos is a 89 y.o. female with a past psychiatric history of SIPD, who presented to the [facility] due to manic behavior. Her symptoms of insomnia, hyperactivity, impulsivity, and delusions are likely 2/2 recent course of steroids. However, these symptoms are endangering her and have been difficult for her family to manage, especially due to the delay in seeing an outpatient provider. Pt would benefit from hospital admission to psychiatrically stabilize, reinstate sleep-wake cycle, rule out medical etiologies of current symptoms, and stay safe while substances continue to wash out.    IMPRESSION  Substance-induced psychotic disorder  R/o bipolar disorder    RECOMMENDATION(S)      1. Scheduled Medication(s):  - Discontinue Seroquel 50 mg PO QHS as it was apparently not beneficial  - Start Risperdal 0.5 mg PO QHS for acute sujata    2. PRN Medication(s):  - Start Risperdal 0.5 mg PO q8hrs PRN non-redirectable agitation    3.  Monitor:  - Please obtain daily EKG to monitor QTc    4. Legal Status/Precaution(s):  - Patient does not meet criteria for PEC at this time. Patient does appear gravely disabled due to a psychiatric illness but is help-seeking and not contesting her admission.      This patient was seen and discussed with Dr. Jefferson. Thank you for the consult - Psychiatry will continue to follow. Please reach out with any questions/concerns.

## 2022-07-25 NOTE — PT/OT/SLP EVAL
Physical Therapy Evaluation and Treatment    Patient Name:  Carmina Arcos   MRN:  [id number]    Recommendations:     Discharge Recommendations:  nursing facility, skilled   Discharge Equipment Recommendations: none   Barriers to discharge: None    Assessment:     Carmina Arcos is a 89 y.o. female admitted with a medical diagnosis of Drug-induced delirium.  She presents with the following impairments/functional limitations:  weakness, impaired cognition, impaired endurance, decreased coordination, impaired self care skills, impaired functional mobility, gait instability, impaired balance . Carmina tolerated treatment well today. Pt seated on toilet seat with daughter outside the room upon PT entry. Pt presents with altered mental status demonstrated by pt conversing with persons not physically present. Multiple attempts to engage pt in returning pt to bed, but pt easily distracted and non agreeable and easily agitated. Pt reported hunger which caused pt to be agreeable in returning to bed. Moderate assistance of 2 persons required to return to bed. Pt able to maintain seated balance at EOB and maintain conversation well. Pt attempts to make transitions without regard for pt safety. Requires frequent redirection to task at hand. Pt independent at baseline, but currently functioning below baseline in part due to altered mental status. Recommendation for skilled nursing facility to address functional and mobility deficits, and assist with pt decrease in safety awareness.    Rehab Prognosis: Good; patient would benefit from acute skilled PT services to address these deficits and reach maximum level of function.    Recent Surgery: * No surgery found *      Plan:     During this hospitalization, patient to be seen 3 x/week to address the identified rehab impairments via gait training, therapeutic activities, therapeutic exercises, neuromuscular re-education and progress toward the following goals:    · Plan of Care  Expires:  08/25/22    Subjective     Chief Complaint: no complaints  Patient/Family Comments/goals: Return home  Pain/Comfort:  Pain Rating 1: 0/10    Patients cultural, spiritual, Baptism conflicts given the current situation: no    Living Environment:  Patient lives alone in a single story home with no steps with children who live nearby.  Prior to admission, patients level of function was Independent.  Equipment used at home: cane, straight.  DME owned (not currently used): none.  Upon discharge, patient will have assistance from her children.    Objective:     Communicated with RN prior to session.  Patient found seated on bathroom toilet with peripheral IV  upon PT entry to room.    General Precautions: Standard, fall   Orthopedic Precautions:N/A   Braces: N/A  Respiratory Status: Room air    Exams:  · Cognitive Exam:  Patient is oriented to Person, Place, Time, Situation intermittently throughout session. Pt alert but presents with confusion and engages in conversation with persons not physically present.  · Gross Motor Coordination:  WFL  · RUE ROM: WFL  · RUE Strength: WFL  · LUE ROM: WFL  · LUE Strength: WFL  · RLE ROM: WFL  · RLE Strength: WFL  · LLE ROM: WFL  · LLE Strength: WFL    Functional Mobility:  · Transfers:     · Sit to Stand: moderate assistance with no AD with pt using RUE to support self on bathroom counter.   · Stand to sit: minimum assistance x 2 persons for safety reaching EOB  · Gait: Pt ambulated 20' from bathroom to EOB with moderate assistance of 2 persons. Gait presents with decreased step length, decreased syed, kyphotic posture, and floor directed gaze. Verbal cueing required for initiating steps for sequencing of gait.     Therapeutic Activities and Exercises:   Pt performed static sitting balance while using the bathroom. PT instructed pt on diaphragmatic breathing during overwhelming sensations. Pt performed static sitting balance EOB while engaging in conversation.    Pt  educated on role of PT and POC.     AM-PAC 6 CLICK MOBILITY  Total Score:14     Patient left sitting edge of bed with daughter present.    GOALS:   Multidisciplinary Problems     Physical Therapy Goals        Problem: Physical Therapy    Goal Priority Disciplines Outcome Goal Variances Interventions   Physical Therapy Goal     PT, PT/OT Ongoing, Progressing     Description: Goals to be met by: 2022     Patient will increase functional independence with mobility by performin. Supine to sit with Modified [place].  2. Sit to supine with Modified Dex[place].  3. Sit to stand transfer with Modified Dex[place] using Single- point Cane.  4. Bed to chair transfer with Modified Dex[place] using Single-point Cane   5. Gait  x 200 feet with Modified Dexter using Single-point Cane .                      History:     Past Medical History:   Diagnosis Date    Anxiety     Breast cancer     right (lumpectomy and radiation)    Broken bones     Colon cancer     COPD (chronic obstructive pulmonary disease)     Depression     depression (no meds now)    Hypertension        Past Surgical History:   Procedure Laterality Date    APPENDECTOMY      BREAST LUMPECTOMY Right     colon operation       COLON SURGERY      HYSTERECTOMY      Pt states still have ovaries     JOINT REPLACEMENT      TONSILLECTOMY         Time Tracking:     PT Received On: 22  PT Start Time: 842     PT Stop Time: 917  PT Total Time (min): 35 min     Billable Minutes: Evaluation 5, Gait Training 10 and Therapeutic Activity 20      2022

## 2022-07-25 NOTE — PLAN OF CARE
Left vm with daughter apple about snf placement.  Waiting to hear back. Will continue to follow-up.     DCP: efrain Robison RN [facility] [phone]

## 2022-07-25 NOTE — ED NOTES
Assumed care of patient. Pt is yelling and has flight of ideas and pressured speech. Pt is on 2 Lpm NC.    Patient identifiers verified and correct for Carmina Arcos    LOC: The patient is awake, alert, and aware of environment. The patient is AOX4 and speaking appropriately.   APPEARANCE: hyperactive  HEENT: WDL, PERRLA  PSYCHOSOCIAL: hyperactive, flight of ideas, pressured speech, disorganized thought process  SKIN: The skin is warm, dry, color consistent with ethnicity. No breakdown or brusing visible.  RESPIRATORY: Airway is open and patent. Bilateral chest rise and fall. Respiratory rate even and unlabored.  No accessory muscle use noted.  CARDIAC: Patient has a normal rate and rhythm. No complaints of chest pain.  ABDOMEN/GI: Soft, non tender. No distention noted. Denies n/v/d.   URINARY:  Voids independently without difficulty. No complaints of frequency, urgency, burning, or blood in urine.   NEUROLOGIC: Eyes open spontaneously. Speech clear.  Able to follow commands, demonstrating ability to actively and appropriately communicate within context of current conversation. Symmetrical facial muscles. Movement is purposeful. Denies dizziness/lightheadedness.  MUSCULOSKELETAL: No obvious deformities noted. Full ROM in all extremities.  PERIPHERAL VASCULAR: Cap refill <3 secs bilaterally. No peripheral edema noted. Denies numbness and tingling in extremities.

## 2022-07-25 NOTE — PLAN OF CARE
Patient had an uneventful day  Problem: Adult Inpatient Plan of Care  Goal: Plan of Care Review  Outcome: Ongoing, Progressing  Goal: Patient-Specific Goal (Individualized)  Outcome: Ongoing, Progressing  Goal: Absence of Hospital-Acquired Illness or Injury  Outcome: Ongoing, Progressing  Goal: Optimal Comfort and Wellbeing  Outcome: Ongoing, Progressing  Goal: Readiness for Transition of Care  Outcome: Ongoing, Progressing     Problem: Fall Injury Risk  Goal: Absence of Fall and Fall-Related Injury  Outcome: Ongoing, Progressing

## 2022-07-25 NOTE — ASSESSMENT & PLAN NOTE
Group Topic:  Education    Date: 3/25/2022  Start Time: 1030  End Time: 1115  Facilitators: Henok Gardner; Lynn Hoskins MS    Pt was recruited for group but did not attend. Efforts to encourage participation in programming on the unit will continue.  Henok Gardner, LPC       Home regimen: amlodipine 2.5 mg QD, HCTZ 25 mg QD, Losartan 100 mg QD     Plan:   - Resume home regimen

## 2022-07-25 NOTE — SUBJECTIVE & OBJECTIVE
Interval History:    No acute events over night, patient requested her nebulizer. Affirms that she slept. Continues with pressured speech and flight of ideas.    Review of Systems  Objective:     Vital Signs (Most Recent):  Temp: 98.8 °F (37.1 °C) (07/25/22 1637)  Pulse: 95 (07/25/22 1637)  Resp: 17 (07/25/22 1637)  BP: 117/62 (07/25/22 1637)  SpO2: (!) 90 % (07/25/22 1637)   Vital Signs (24h Range):  Temp:  [98 °F (36.7 °C)-98.8 °F (37.1 °C)] 98.8 °F (37.1 °C)  Pulse:  [] 95  Resp:  [16-20] 17  SpO2:  [88 %-95 %] 90 %  BP: (115-135)/(59-90) 117/62     Weight: 80.3 kg (177 lb)  Body mass index is 31.35 kg/m².  No intake or output data in the 24 hours ending 07/25/22 1704   Physical Exam  HENT:      Head: Normocephalic and atraumatic.      Right Ear: External ear normal.      Left Ear: External ear normal.      Nose: Nose normal.      Mouth/Throat:      Mouth: Mucous membranes are moist.      Pharynx: Oropharynx is clear.   Eyes:      General: No scleral icterus.     Extraocular Movements: Extraocular movements intact.      Conjunctiva/sclera: Conjunctivae normal.   Cardiovascular:      Rate and Rhythm: Normal rate and regular rhythm.      Heart sounds: No murmur heard.  Pulmonary:      Effort: Pulmonary effort is normal.      Breath sounds: Normal breath sounds. No wheezing or rales.   Abdominal:      General: Abdomen is flat. Bowel sounds are normal. There is no distension.      Palpations: Abdomen is soft.      Tenderness: There is no abdominal tenderness.   Musculoskeletal:         General: No swelling. Normal range of motion.      Cervical back: Normal range of motion.   Skin:     General: Skin is warm and dry.   Neurological:      General: No focal deficit present.      Mental Status: She is alert.   Psychiatric:      Comments: Flight of ideas,delusions, pressured speech       Significant Labs: All pertinent labs within the past 24 hours have been reviewed.  Recent Lab Results       None             Significant Imaging: I have reviewed all pertinent imaging results/findings within the past 24 hours.

## 2022-07-25 NOTE — ASSESSMENT & PLAN NOTE
Ms. Arcos is a 89 yo that  according to daughter after having a 3-4 weeks ago COPD exacerbation, was started on Prednisone by her son who is a nurse. States  that after a few days she started noticing behavioral changes described as flight of ideas, insomnia, rapid speech, alternating irritability with excitability, grandiose delusions auditory hallucinations, fixation with allegories, numerology and Buddhism.     Differential includes but not limited to drug induced delirium like from recent steroids use vs manic episode from mood disorder. Unlikely to be 2/2 to infection given normal WBC and lacks of infectious symptoms. UA is also negative. UDS is also negative. TSH is normal, low likely moran for thyroid condition     - Psychiatry recommends changing seroquel to risperidal at night. 7/25  - Continue to monitor mood and mental status   - Delirium precautions   - Holding further steroids

## 2022-07-25 NOTE — PROGRESS NOTES
"[facility]  Psychiatry  Progress Note    Patient Name: Carmina Arcos  MRN: [id number]   Code Status: Full Code  Admission Date: 7/24/2022  Hospital Length of Stay: 0 days  Expected Discharge Date: 7/26/2022  Attending Physician: Susie Wynne MD  Primary Care Provider: Dick Graham MD    Current Legal Status: Uncontested    Patient information was obtained from patient, relative(s), ER records and primary team.       Subjective:     Patient is a 89 y.o., female, presents with:    Principal Problem:Drug-induced delirium    Chief Complaint: sujata    HPI:   Carmina Arcos is a 89 y.o. female with a past psychiatric history of substance-induced psychotic disorder who presented to [facility] due to Manic behavior. Psychiatry was consulted for "sujata x several days, multiple ED visits".    Per Primary Team:  89 yo W with pmhx COPD, anxiety, depression, HTN, breast CA presents with family for psych evaluation.  History is assisted by the patient and the patient's daughter.  They note that patient was having coughing flares of COPD 3 weeks ago and she was started on course of prednisone by a family member.  She was on the prednisone for almost 2 weeks.  The prednisone has since discontinued that she continues to have manic symptoms including insomnia, reckless spending behavior, typical references Septra.  She was seen in the emergency department several days ago and discharged home with close observation by family members and outpatient psychiatry follow-up.  That follow-up is in 6 days.  Family states that she will need a sooner psychiatric evaluation.  She did have history of manic episode many years ago in the setting of taking a diet pill.  She was admitted inpatient time.  The patient does report some foul-smelling odor in her urine and is concerned that she has a kidney infection.  Otherwise she is essentially without complaints.     The history is provided by the patient and a relative. No language " " was used.     Per Psychiatry:  Patient was seen in the ED with daughter at bedside; pt and daughter provided history together. Pt was alert, oriented x4, rapid pressured interruptive speech, euthymic affect. They report about a week of psychiatric symptoms including excitability/irritability, anxiety, insomnia, hyperactivity, visual hallucinations, hyperreligious and grandiose delusions. Pt has been preoccupied with numerology and believes her family is part of a biblical story and that she has been in the [place] [facility] with Lázaro. She has also been impulsively shopping and spending large amounts of money. She has intermittent confusion, FOI, BALA on exam.    Pt has a history of COPD and started a tapered 2-wk course of PO Prednisone for a presumed exacerbation, which she got from her son (a nurse and RT). Before the course was completed, the pt's psychiatric symptoms started as described above. Family was concerned that the Prednisone may be causing her symptoms but they could not convince her to stop taking it. They presented to the ED several times since then and were initially comfortable with outpatient follow-up with pt's former psychiatrist, Dr. Ceballos, on Friday 7/29. However, they have become increasingly concerned with pt's symptoms and feel she may be a danger to herself.     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 She speaks several languages and has no known history of cognitive decline. Her psychiatric history includes one substance-induced psychotic episode in 1999. She took some stimulant diet pills, developed insomnia and visual hallucinations, and was admitted to [facility]. She was started on Risperdal and Zoloft, which she continued to see Dr. Ceballos for for some time. Eventually her symptoms resolved and she went back to her "relatively high-strung" baseline, per daughter. She has been out of Dr. Ceballos's care " "and not having psychiatric issues for the past 10+ years. Pt's daughter also reports a remote suicide attempt over 30 yrs ago, via Benadryl overdose.    Prior to the course of Prednisone, pt was at baseline with none of her current symptoms. She last took Prednisone on Wednesday 7/20 but the symptoms have persisted. Pt and family are amenable to inpt hospitalization for her current symptoms.    Medical Review of Systems:  Complete review of systems performed covering Constitutional, Eyes, ENT/Mouth, Cardiovascular, Respiratory, Gastrointestinal, Genitourinary, Musculoskeletal, Skin, Neurologic, Endocrine, Heme/Lymph, and Allergy/Immune.     Complete review of systems was negative with the exception of the following positive symptoms: "funny feeling" in pt's gums/teeth    Psychiatric Review of Systems-is patient experiencing or having changes in  sleep: yes, insomnia  appetite: no  weight: no  energy/anergy: yes, hyperactive  interest/pleasure/anhedonia: no  somatic symptoms: no  libido: no  anxiety/panic: yes  guilty/hopelessness: no  concentration: yes  S.I.B.s/risky behavior: yes, impulsive shopping  any drugs: no  alcohol: no     Allergies:  Penicillins    Past Medical/Surgical History:  Past Medical History:   Diagnosis Date    Anxiety     Breast cancer 2000    right (lumpectomy and radiation)    Broken bones     Colon cancer     COPD (chronic obstructive pulmonary disease)     Depression     depression (no meds now)    Hypertension      Past Surgical History:   Procedure Laterality Date    APPENDECTOMY      BREAST LUMPECTOMY Right     colon operation       HYSTERECTOMY      Pt states still have ovaries     TONSILLECTOMY         Past Psychiatric History:  Previous Medication Trials:  yes, Risperdal and Zoloft    Previous Psychiatric Hospitalizations: yes   Previous Suicide Attempts: yes   History of Violence: no  Outpatient Psychiatrist:  Dr. Ceballos (formerly, with plans to re-establish)    Social " History:  Marital Status:   Children: 3   Employment Status/Info: retired  Education: college graduate  Special Ed: no  Housing Status: lives alone  History of phys/sexual abuse: no  Access to gun: no    Substance Abuse History:  Recreational Drugs:  denied  Use of Alcohol: occasional, social use  Rehab History: no   Tobacco Use: no  Use of OTC: denied    Legal History:  Past Charges/Incarcerations: no   Pending charges: no     Family Psychiatric History:   denied    Psychosocial Stressors: health  Functioning Relationships: good support system and poor relationship with children      Hospital Course: 7/25/22:  Patient received first dose of scheduled Seroquel 50 mg PO QHS overnight, but had poor sleep, frequently getting up from bed and becoming agitated regarding delusional thought content (per daughter and nursing). Patient seen sitting up at bedside this morning eating a breakfast tray. Her affect was labile, ranging from angry/irritable to tearful to laughing/excited. She continues to have hyperreligious thoughts, is fixated on the thermostat in her room, and intermittently anxious/agitated per daughter. She continues to have FOI, pressured speech, but is also disoriented today. She acknowledges feeling confused and is reorientable, but listed time and place as [address] [place] [address].          Family History    None       Tobacco Use    Smoking status: Current Every Day Smoker     Types: Cigarettes    Smokeless tobacco: Never Used    Tobacco comment: smoked for 30-35 years per daughter quit many years ago   Substance and Sexual Activity    Alcohol use: Yes     Comment: socially less than one a month    Drug use: No    Sexual activity: Not Currently     Psychotherapeutics (From admission, onward)                Start     Stop Route Frequency Ordered    07/24/22 2100  QUEtiapine tablet 50 mg         -- Oral Nightly 07/24/22 7912               Objective:     Vital Signs (Most Recent):  Temp:  "98.1 °F (36.7 °C) (07/25/22 1126)  Pulse: 104 (07/25/22 1126)  Resp: 18 (07/25/22 1126)  BP: (!) 130/59 (07/25/22 1126)  SpO2: (!) 91 % (07/25/22 1126)   Vital Signs (24h Range):  Temp:  [98 °F (36.7 °C)-98.7 °F (37.1 °C)] 98.1 °F (36.7 °C)  Pulse:  [] 104  Resp:  [16-20] 18  SpO2:  [88 %-95 %] 91 %  BP: (115-177)/(59-90) 130/59     Height: 5' 3" (160 cm)  Weight: 80.3 kg (177 lb)  Body mass index is 31.35 kg/m².    No intake or output data in the 24 hours ending 07/25/22 1223    MSE:  Orientation: oriented to person, disoriented to place and time  Speech: rapid, pressured, interruptive  Language: fluent  Mood: anxious  Affect: elevated, labile, irritable  Thought Process: racing, FOI, tangential  Associations: loose  Thought Content: No SI/HI/AVH. +delusions, grandiose, hyperreligious  Memory: Recent intact, Remote intact  Attention and Concentration: Distractible  Fund of Knowledge: Appropriate for education level  Insight: Good  Judgment: Poor    Significant Labs: Last 24 Hours:   Recent Lab Results         07/24/22  1344        Benzodiazepines Negative       Methadone metabolites Negative       Phencyclidine Negative       Amphetamine Screen, Ur Negative       Barbiturate Screen, Ur Negative       Cocaine (Metab.) Negative       Creatinine, Urine 114.0       Opiate Scrn, Ur Negative       Marijuana (THC) Metabolite Negative       Toxicology Information SEE COMMENT  Comment: This screen includes the following classes of drugs at the listed   cut-off:    Benzodiazepines 200 ng/ml  Methadone 300 ng/ml  Cocaine metabolite 300 ng/ml  Opiates 300 ng/ml  Barbiturates 200 ng/ml  Amphetamines 1000 ng/ml  Marijuana metabs (THC) 50 ng/ml  Phencyclidine (PCP) 25 ng/ml    This is a screening test. If results do not correlate with clinical   presentation, then a confirmatory send out test is advised.     This report is intended for use in clinical monitoring and management   of   patients. It is not intended for use in " employment related drug   testing.                 Significant Imaging: None       Scheduled Medications:   enoxaparin  40 mg Subcutaneous Daily    famotidine  20 mg Oral Daily    [START ON 7/26/2022] levothyroxine  50 mcg Oral Before breakfast    melatonin  6 mg Oral Nightly    risperiDONE  0.5 mg Oral QHS       PRN Medications:  acetaminophen, albuterol-ipratropium, dextrose 10%, dextrose 10%, glucagon (human recombinant), glucose, glucose, naloxone, risperiDONE, sodium chloride 0.9%    Review of patient's allergies indicates:   Allergen Reactions    Penicillins Hives and Shortness Of Breath       Assessment/Plan:     * Drug-induced delirium  ASSESSMENT     Carmina Arcos is a 89 y.o. female with a past psychiatric history of SIPD, who presented to the [facility] due to manic behavior. Her symptoms of insomnia, hyperactivity, impulsivity, and delusions are likely 2/2 recent course of steroids. However, these symptoms are endangering her and have been difficult for her family to manage, especially due to the delay in seeing an outpatient provider. Pt would benefit from hospital admission to psychiatrically stabilize, reinstate sleep-wake cycle, rule out medical etiologies of current symptoms, and stay safe while substances continue to wash out.    IMPRESSION  Substance-induced psychotic disorder  R/o bipolar disorder    RECOMMENDATION(S)      1. Scheduled Medication(s):  - Discontinue Seroquel 50 mg PO QHS as it was apparently not beneficial  - Start Risperdal 0.5 mg PO QHS for acute sujata    2. PRN Medication(s):  - Start Risperdal 0.5 mg PO q8hrs PRN non-redirectable agitation    3.  Monitor:  - Please obtain daily EKG to monitor QTc    4. Legal Status/Precaution(s):  - Patient does not meet criteria for PEC at this time. Patient does appear gravely disabled due to a psychiatric illness but is help-seeking and not contesting her admission.      This patient was seen and discussed with Dr. Jefferson. Thank you for  the consult - Psychiatry will continue to follow. Please reach out with any questions/concerns.           Need for Continued Hospitalization:  Psychiatric illness continues to pose a potential threat to life or bodily function, of self or others, thereby requiring the need for continued inpatient psychiatric hospitalization.    Anticipated Disposition:  Psychiatric Hospital vs Home/Self-care    Total time:  35 with greater than 50% of this time spent in counseling and/or coordination of care.       Alba Tyson MD   Psychiatry  [facility]

## 2022-07-25 NOTE — SUBJECTIVE & OBJECTIVE
"    Family History    None       Tobacco Use    Smoking status: Current Every Day Smoker     Types: Cigarettes    Smokeless tobacco: Never Used    Tobacco comment: smoked for 30-35 years per daughter quit many years ago   Substance and Sexual Activity    Alcohol use: Yes     Comment: socially less than one a month    Drug use: No    Sexual activity: Not Currently     Psychotherapeutics (From admission, onward)                Start     Stop Route Frequency Ordered    07/24/22 2100  QUEtiapine tablet 50 mg         -- Oral Nightly 07/24/22 1822               Objective:     Vital Signs (Most Recent):  Temp: 98.1 °F (36.7 °C) (07/25/22 1126)  Pulse: 104 (07/25/22 1126)  Resp: 18 (07/25/22 1126)  BP: (!) 130/59 (07/25/22 1126)  SpO2: (!) 91 % (07/25/22 1126)   Vital Signs (24h Range):  Temp:  [98 °F (36.7 °C)-98.7 °F (37.1 °C)] 98.1 °F (36.7 °C)  Pulse:  [] 104  Resp:  [16-20] 18  SpO2:  [88 %-95 %] 91 %  BP: (115-177)/(59-90) 130/59     Height: 5' 3" (160 cm)  Weight: 80.3 kg (177 lb)  Body mass index is 31.35 kg/m².    No intake or output data in the 24 hours ending 07/25/22 1223    MSE:  Orientation: oriented to person, disoriented to place and time  Speech: rapid, pressured, interruptive  Language: fluent  Mood: anxious  Affect: elevated, labile, irritable  Thought Process: racing, FOI, tangential  Associations: loose  Thought Content: No SI/HI/AVH. +delusions, grandiose, hyperreligious  Memory: Recent intact, Remote intact  Attention and Concentration: Distractible  Fund of Knowledge: Appropriate for education level  Insight: Good  Judgment: Poor    Significant Labs: Last 24 Hours:   Recent Lab Results         07/24/22  1344        Benzodiazepines Negative       Methadone metabolites Negative       Phencyclidine Negative       Amphetamine Screen, Ur Negative       Barbiturate Screen, Ur Negative       Cocaine (Metab.) Negative       Creatinine, Urine 114.0       Opiate Scrn, Ur Negative       Marijuana (THC) " Metabolite Negative       Toxicology Information SEE COMMENT  Comment: This screen includes the following classes of drugs at the listed   cut-off:    Benzodiazepines 200 ng/ml  Methadone 300 ng/ml  Cocaine metabolite 300 ng/ml  Opiates 300 ng/ml  Barbiturates 200 ng/ml  Amphetamines 1000 ng/ml  Marijuana metabs (THC) 50 ng/ml  Phencyclidine (PCP) 25 ng/ml    This is a screening test. If results do not correlate with clinical   presentation, then a confirmatory send out test is advised.     This report is intended for use in clinical monitoring and management   of   patients. It is not intended for use in employment related drug   testing.                 Significant Imaging: None

## 2022-07-25 NOTE — HOSPITAL COURSE
"  Initiated Seroquel 50 mg PO QHS overnight on 7/25, but had poor sleep, frequently getting up from bed and becoming agitated regarding delusional thought content (per daughter and nursing). Labile affect with hyperreligious thoughts. Disoriented to time, place. Seroquel was siwtched to risperdal 0.5mg qhs on 7/26, continued to be hyperreligious but AOx4. Intermittently agitated overnight on 7/27, with incident during which she fell at 3AM as she frequently got out of bed without assistance. Became physically threatening with staff in the AM. Continued to have minimal sleep.     7/28/22:  Overnight, pt had another fall/weakness episode requiring assistance to return to bed from bathroom. She received scheduled Risperdal 1 mg PO scheduled and 0.5 mg PRN @ 0216 for agitation. Pt reportedly did not respond to the PRN medication and continued to be elevated, irritable, and not sleeping. This morning, pt was sleeping but rousable for interview. She continued to be irritable and hyperreligious, referring to her "visions" and sleeping when God wants her to. She was oriented x3 and denied complaints other than wanting to sleep and being tired of getting interrupted. Interview terminated due to pt's irritability.    7/29/22:  Patient was agitated yesterday, frequently screaming, crying, and calling family members to discuss Hoahaoism delusions. She received PRN IM Zyprexa 2.5 mg yesterday afternoon and slept for several hours afterward. She received her first dose of QHS VPA yesterday and slept well throughout the night, per nursing. Pt was sleeping and difficult to arouse this morning but participated in brief psychiatric interview. She was oriented to time/place and endorsed good sleep overnight. Recalls recent events (daughter bringing baked goods to bedside). She reported she was still having her "visions" but said they were "allegory", not real. Interview terminated due to pt's drowsiness. Per nursing, pt was " "conversant/oriented this morning with son-in-law at bedside, both remarked how much calmer she was today than previous days.    7/31/22: Patient resting in bed with family at bedside, minimally interactive with eyes closed throughout most of interaction. Described the year as "2023" and when informed it was 2022, denied and stated that it was 2023. Perseverated on saying "they weren't telling the truth," and when asked who "they" were, she continuously stated "everyone." Reportedly not given risperdal and depakote d/t confusion and inability to swallow.    8/1/22:  Patient was frequently somnolent and intermittently unresponsive over the weekend, suspected to be 2/2 CO2 narcosis (confirmed via ABG yesterday, improved with Bipap). Per chart, she did not receive PO psych meds over the weekend due to difficulty swallowing and poor mentation. Her PO Depakote was switched to IV Depakon 125 BID. She was seen sleeping, upright in bed this morning, difficult to rouse and to keep alert/attentive. Frequently falling asleep and mumbling incoherently but also answering appropriately at times; oriented x4. Her family at bedside is concerned about her breathing as well as her ongoing irritability and refusal to cooperate with care at times.    8/2/22:  Slept poorly overnight per daughter in room and per nursing notes. Patient reports she slept fine. Not wearing O2 at this time. Says that if she wears it she will die. Cannot explain why - instead tells me to "fast forward, you'll find out." Oriented to year, month, place, self. Hurrying me along - "Go ahead ask another question doctor" - "Can you hurry and ask your questions?" Daughter says she is rushing me because then she doesn't have to try and organize her thoughts.     8/6/22:  Chart reviewed. ARGELIA. Medication compliant and no behavioral PRNs required. Patient greeted at bedside, oriented to person, place, month, year, day of week, current events. Reports her mood is "more " "optimistic" today. Says she visited with both her grandson and great-grandson who came to see her today. She is calm, grossly linear in thought process, and pleasant. Endorses some mild paranoia at times. Otherwise denies SI/HI/AVH.     8/7/22:  Patient seen and chart reviewed. No acute events documented overnight. Compliant with scheduled medications, did not require any behavioral PRNs. Patient seen today with family at bedside. Family reports that patient appeared to be "100% lucid" earlier this morning, and believes the current medication regimen is working well. Patient allowed to continue resting at this time.   "

## 2022-07-25 NOTE — ED NOTES
Pt is now sleeping in stretcher. RR even and unlabored. No needs at this time. Will continue to monitor.

## 2022-07-25 NOTE — NURSING
Per patient daughter just got off the phone with patient primary physician. She stated that he okay with Mrs Grewal to stop the HCTZ per Dr. Graham because of leg cramps as long as her bp stays in good range. Also she wanted her off the amlodipine due to ankle swelling. Informed doctor on patient daughter update on patient.

## 2022-07-26 PROBLEM — F23 ACUTE PSYCHOSIS: Status: ACTIVE | Noted: 2022-07-24

## 2022-07-26 PROBLEM — J44.9 COPD, SEVERITY TO BE DETERMINED: Status: ACTIVE | Noted: 2022-07-24

## 2022-07-26 PROBLEM — J43.8 OTHER EMPHYSEMA: Status: ACTIVE | Noted: 2022-07-24

## 2022-07-26 LAB
ALBUMIN SERPL BCP-MCNC: 3.4 G/DL (ref 3.5–5.2)
ALP SERPL-CCNC: 97 U/L (ref 55–135)
ALT SERPL W/O P-5'-P-CCNC: 12 U/L (ref 10–44)
ANION GAP SERPL CALC-SCNC: 13 MMOL/L (ref 8–16)
AST SERPL-CCNC: 18 U/L (ref 10–40)
BASOPHILS # BLD AUTO: 0.06 K/UL (ref 0–0.2)
BASOPHILS NFR BLD: 0.9 % (ref 0–1.9)
BILIRUB SERPL-MCNC: 0.3 MG/DL (ref 0.1–1)
BUN SERPL-MCNC: 30 MG/DL (ref 10–30)
CALCIUM SERPL-MCNC: 9.8 MG/DL (ref 8.7–10.5)
CHLORIDE SERPL-SCNC: 96 MMOL/L (ref 95–110)
CO2 SERPL-SCNC: 23 MMOL/L (ref 23–29)
CREAT SERPL-MCNC: 0.8 MG/DL (ref 0.5–1.4)
DIFFERENTIAL METHOD: ABNORMAL
EOSINOPHIL # BLD AUTO: 0.4 K/UL (ref 0–0.5)
EOSINOPHIL NFR BLD: 5.4 % (ref 0–8)
ERYTHROCYTE [DISTWIDTH] IN BLOOD BY AUTOMATED COUNT: 13 % (ref 11.5–14.5)
EST. GFR  (AFRICAN AMERICAN): >60 ML/MIN/1.73 M^2
EST. GFR  (NON AFRICAN AMERICAN): >60 ML/MIN/1.73 M^2
GLUCOSE SERPL-MCNC: 191 MG/DL (ref 70–110)
HCT VFR BLD AUTO: 41.4 % (ref 37–48.5)
HGB BLD-MCNC: 13.4 G/DL (ref 12–16)
IMM GRANULOCYTES # BLD AUTO: 0.03 K/UL (ref 0–0.04)
IMM GRANULOCYTES NFR BLD AUTO: 0.5 % (ref 0–0.5)
LYMPHOCYTES # BLD AUTO: 1.2 K/UL (ref 1–4.8)
LYMPHOCYTES NFR BLD: 17.3 % (ref 18–48)
MAGNESIUM SERPL-MCNC: 2.2 MG/DL (ref 1.6–2.6)
MCH RBC QN AUTO: 28.2 PG (ref 27–31)
MCHC RBC AUTO-ENTMCNC: 32.4 G/DL (ref 32–36)
MCV RBC AUTO: 87 FL (ref 82–98)
MONOCYTES # BLD AUTO: 0.7 K/UL (ref 0.3–1)
MONOCYTES NFR BLD: 10.4 % (ref 4–15)
NEUTROPHILS # BLD AUTO: 4.3 K/UL (ref 1.8–7.7)
NEUTROPHILS NFR BLD: 65.5 % (ref 38–73)
NRBC BLD-RTO: 0 /100 WBC
PHOSPHATE SERPL-MCNC: 4.3 MG/DL (ref 2.7–4.5)
PLATELET # BLD AUTO: 230 K/UL (ref 150–450)
PMV BLD AUTO: 10.7 FL (ref 9.2–12.9)
POTASSIUM SERPL-SCNC: 3.3 MMOL/L (ref 3.5–5.1)
PROT SERPL-MCNC: 6.9 G/DL (ref 6–8.4)
RBC # BLD AUTO: 4.75 M/UL (ref 4–5.4)
SODIUM SERPL-SCNC: 132 MMOL/L (ref 136–145)
WBC # BLD AUTO: 6.63 K/UL (ref 3.9–12.7)

## 2022-07-26 PROCEDURE — 84100 ASSAY OF PHOSPHORUS: CPT | Performed by: STUDENT IN AN ORGANIZED HEALTH CARE EDUCATION/TRAINING PROGRAM

## 2022-07-26 PROCEDURE — 83735 ASSAY OF MAGNESIUM: CPT | Performed by: STUDENT IN AN ORGANIZED HEALTH CARE EDUCATION/TRAINING PROGRAM

## 2022-07-26 PROCEDURE — 85025 COMPLETE CBC W/AUTO DIFF WBC: CPT | Performed by: STUDENT IN AN ORGANIZED HEALTH CARE EDUCATION/TRAINING PROGRAM

## 2022-07-26 PROCEDURE — 25000003 PHARM REV CODE 250: Performed by: HOSPITALIST

## 2022-07-26 PROCEDURE — 25000003 PHARM REV CODE 250: Performed by: STUDENT IN AN ORGANIZED HEALTH CARE EDUCATION/TRAINING PROGRAM

## 2022-07-26 PROCEDURE — 99226 PR SUBSEQUENT OBSERVATION CARE,LEVEL III: CPT | Mod: GC,,, | Performed by: HOSPITALIST

## 2022-07-26 PROCEDURE — G0378 HOSPITAL OBSERVATION PER HR: HCPCS

## 2022-07-26 PROCEDURE — 96372 THER/PROPH/DIAG INJ SC/IM: CPT | Performed by: STUDENT IN AN ORGANIZED HEALTH CARE EDUCATION/TRAINING PROGRAM

## 2022-07-26 PROCEDURE — 99226 PR SUBSEQUENT OBSERVATION CARE,LEVEL III: ICD-10-PCS | Mod: GC,,, | Performed by: HOSPITALIST

## 2022-07-26 PROCEDURE — 97535 SELF CARE MNGMENT TRAINING: CPT

## 2022-07-26 PROCEDURE — 36415 COLL VENOUS BLD VENIPUNCTURE: CPT | Performed by: STUDENT IN AN ORGANIZED HEALTH CARE EDUCATION/TRAINING PROGRAM

## 2022-07-26 PROCEDURE — 63600175 PHARM REV CODE 636 W HCPCS: Performed by: STUDENT IN AN ORGANIZED HEALTH CARE EDUCATION/TRAINING PROGRAM

## 2022-07-26 PROCEDURE — 80053 COMPREHEN METABOLIC PANEL: CPT | Performed by: STUDENT IN AN ORGANIZED HEALTH CARE EDUCATION/TRAINING PROGRAM

## 2022-07-26 PROCEDURE — 97165 OT EVAL LOW COMPLEX 30 MIN: CPT

## 2022-07-26 RX ORDER — LANOLIN ALCOHOL/MO/W.PET/CERES
400 CREAM (GRAM) TOPICAL ONCE
Status: COMPLETED | OUTPATIENT
Start: 2022-07-26 | End: 2022-07-26

## 2022-07-26 RX ORDER — LOSARTAN POTASSIUM 50 MG/1
100 TABLET ORAL DAILY
Status: DISCONTINUED | OUTPATIENT
Start: 2022-07-26 | End: 2022-08-09 | Stop reason: HOSPADM

## 2022-07-26 RX ORDER — RISPERIDONE 1 MG/1
1 TABLET ORAL NIGHTLY
Status: DISCONTINUED | OUTPATIENT
Start: 2022-07-26 | End: 2022-07-31

## 2022-07-26 RX ADMIN — LOSARTAN POTASSIUM 100 MG: 50 TABLET, FILM COATED ORAL at 01:07

## 2022-07-26 RX ADMIN — ENOXAPARIN SODIUM 40 MG: 40 INJECTION SUBCUTANEOUS at 04:07

## 2022-07-26 RX ADMIN — ACETAMINOPHEN 650 MG: 325 TABLET ORAL at 10:07

## 2022-07-26 RX ADMIN — Medication 400 MG: at 01:07

## 2022-07-26 RX ADMIN — LEVOTHYROXINE SODIUM 50 MCG: 50 TABLET ORAL at 05:07

## 2022-07-26 RX ADMIN — Medication 6 MG: at 08:07

## 2022-07-26 RX ADMIN — POTASSIUM BICARBONATE 25 MEQ: 978 TABLET, EFFERVESCENT ORAL at 10:07

## 2022-07-26 RX ADMIN — FAMOTIDINE 20 MG: 20 TABLET ORAL at 08:07

## 2022-07-26 RX ADMIN — RISPERIDONE 1 MG: 1 TABLET ORAL at 08:07

## 2022-07-26 NOTE — PT/OT/SLP EVAL
Occupational Therapy   Evaluation/Treatment    Name: Carmina Arcos  MRN: [id number]  Admitting Diagnosis:  Drug-induced delirium  Recent Surgery: * No surgery found *      Recommendations:     Discharge Recommendations: home health OT  Discharge Equipment Recommendations:  bedside commode, walker, rolling  Barriers to discharge:  None    Assessment:     Carmina Arcos is a 89 y.o. female with a medical diagnosis of Drug-induced delirium. Performance deficits affecting function: weakness, impaired endurance, impaired self care skills, impaired functional mobility, gait instability, impaired balance. Patient would benefit from continued skilled acute OT 2x/wk to improve functional mobility, increase independence with ADLs, and address established goals. Recommending HHOT once medically appropriate for discharge to increase maximal independence, reduce burden of care, and ensure safety.     Rehab Prognosis: Good; patient would benefit from acute skilled OT services to address these deficits and reach maximum level of function.       Plan:     Patient to be seen 2 x/week to address the above listed problems via self-care/home management, therapeutic activities, therapeutic exercises  · Plan of Care Expires: 08/26/22  · Plan of Care Reviewed with: daughter, patient    Subjective     Chief Complaint: none noted  Patient/Family Comments/goals: to go home    Occupational Profile:  Living Environment: Patient lives alone in a single story home with no steps with children who live nearby. Patient has tub shower combo and a walk in shower with no bench or grab bar. Patient has a high toilet with no grab bar. Patient uses a hurycane occasionally for mobility and was independent with ADLs. Patient does not drive.    Prior to admission, patients level of function was Independent.  Equipment used at home: cane, straight.  DME owned (not currently used): none.  Upon discharge, patient will have assistance from her  children.    Pain/Comfort:  · Pain Rating 1: 0/10  · Pain Rating Post-Intervention 1: 0/10    Patients cultural, spiritual, Anabaptism conflicts given the current situation: no    Objective:     Communicated with: GIANA prior to session.  Patient found sitting on sofa upon OT entry to room.    General Precautions: Standard, fall   Orthopedic Precautions:N/A   Braces: N/A  Respiratory Status: Room air    Occupational Performance:    Functional Mobility/Transfers:  · Patient completed Sit <> Stand Transfer with supervision<>stand by assistance  with  no assistive device and rolling walker   · Patient completed Toilet Transfer sofa>toilet with SBA with functional ambulation technique with stand by assistance with slow ambulation. Toilet>sofa with RW with supervision and use of RW. Patient ambulated safer and was steadier using the RW.     Patient and daughter educated thoroughly on use of RW for sit<>stands/transfers for safety and technique with hand placement. Also discussed safety concerns and benefit of RW vs rollator.     Activities of Daily Living:  · Grooming: stand by assistance standing at sink to wash/dry hands  · Lower Body Dressing: modified independence [place] and donning socks sitting in sofa  · Toileting: supervision      Cognitive/Visual Perceptual:  Cognitive/Psychosocial Skills:     -       Oriented to: Person, Place, Time and Situation   -       Follows Commands/attention:Follows multistep  commands  -       Communication: clear/fluent  -       Memory: No Deficits noted  -       Safety awareness/insight to disability: intact   -       Mood/Affect/Coping skills/emotional control: Appropriate to situation  Visual/Perceptual:      -Intact      Physical Exam:  Upper Extremity Range of Motion:     -       Right Upper Extremity: WFL  -       Left Upper Extremity: WFL  Upper Extremity Strength:    -       Right Upper Extremity: 3-/5  -       Left Upper Extremity: 3-/5   Strength:    -       Right Upper  Extremity: WFL  -       Left Upper Extremity: WFL  Fine Motor Coordination:    -       Intact  Gross motor coordination: WFL    AMPAC 6 Click ADL:  AMPAC Total Score: 21    Treatment & Education with patient and daughter:  Role of OT and POC  ADL retraining  Functional mobility training  Safety  Discharge planning    Use of BSC and over toilet to assist with getting off and on toilet at home.     Education:  Patient left on sofa with daughter present and all needs met.    GOALS:   Multidisciplinary Problems     Occupational Therapy Goals        Problem: Occupational Therapy    Goal Priority Disciplines Outcome Interventions   Occupational Therapy Goal     OT, PT/OT Ongoing, Progressing    Description: Goals to be met by: 8/13/2022     Patient will increase functional independence with ADLs by performing:    Grooming while standing at sink with Modified [place].  Toileting from toilet with Modified [place] for hygiene and clothing management.   Supine to sit with Modified [place].  Stand pivot transfers with Modified [place].  Toilet transfer to toilet with Modified [place].                     History:     Past Medical History:   Diagnosis Date    Anxiety     Breast cancer 2000    right (lumpectomy and radiation)    Broken bones     Colon cancer     COPD (chronic obstructive pulmonary disease)     Depression     depression (no meds now)    Hypertension        Past Surgical History:   Procedure Laterality Date    APPENDECTOMY      BREAST LUMPECTOMY Right     colon operation       COLON SURGERY      HYSTERECTOMY      Pt states still have ovaries     JOINT REPLACEMENT      TONSILLECTOMY         Time Tracking:     OT Date of Treatment: 07/26/22  OT Start Time: 1023  OT Stop Time: 1050  OT Total Time (min): 27 min    Billable Minutes:Evaluation 10  Self Care/Home Management 17    7/26/2022

## 2022-07-26 NOTE — PROGRESS NOTES
"[facility] [place] [facility]  Progress Note    Patient Name: Carmina Arcos  MRN: [id number]  Patient Class: OP- Observation   Admission Date: 7/24/2022  Length of Stay: 0 days  Attending Physician: Susie Wynne MD  Primary Care Provider: Dick Graham MD        Subjective:     Principal Problem:Drug-induced delirium        HPI:  History provided mostly by daughter Nimco. Ms Grewal is a 89 yo that  according to daughter after having a 3-4 weeks ago COPD exacerbation, was started on Prednisone by her son who is a nurse. States  that after a few days she started noticing behavioral changes described as flight of ideas, insomnia, rapid speech, alternating irritability with excitability, grandiose delusions auditory hallucinations, fixation with allegories, numerology and Yazidism.  Daughter also affirms, reckless spending. She was taken to [facility] but there was no psych service available. [place] decided [place] [facility] [place].  States previous episode precipitated by "diet pills " in 1999 and had to be committed to [facility] for a week. There she was started on risperidone, its unclear for how long she was on it. Daughter states that the patient also has a history of depression with ne suicide attempt.    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 She currently was living on her own and managed rental property, her family lives close to her home and visit regularly.    PMH: Breast Cancer? Colon Cancer. HTN treated with losartan hydrochlorothiazide and amlodipine, Hypothyroidism treated with levothyroxine, COPD treated with with formoterol-glycopyrrolate , albuterol PRN and Sildenafil given by his son.    Surgical hx positive for bilateral knee replacement, tonsillectomy, hysterectomy, right lumpectomy and colon surgery .    Allergies: Penicillin      Overview/Hospital Course:  No notes on file    Interval " History:    No acute events over. Affirms that she slept more than the previous night although not as profound   Her pressured speech and flight of ideas have improved per daughter. She also states a leg cramp during the night that was self limited.    Review of Systems   Constitutional:  Negative for chills and fever.   HENT:  Negative for rhinorrhea, sinus pain and sore throat.    Respiratory:  Negative for cough and chest tightness.    Cardiovascular:  Negative for chest pain.   Gastrointestinal:  Negative for abdominal distention, abdominal pain, constipation and diarrhea.   Endocrine: Negative for cold intolerance.   Genitourinary:  Negative for difficulty urinating.   Musculoskeletal: Negative.    Skin: Negative.    Neurological:  Negative for seizures and headaches.   Psychiatric/Behavioral: Negative.     Objective:     Vital Signs (Most Recent):  Temp: 98.5 °F (36.9 °C) (07/26/22 1239)  Pulse: 88 (07/26/22 1239)  Resp: 17 (07/26/22 1239)  BP: 112/62 (07/26/22 1239)  SpO2: (!) 90 % (07/26/22 1239)   Vital Signs (24h Range):  Temp:  [97 °F (36.1 °C)-98.8 °F (37.1 °C)] 98.5 °F (36.9 °C)  Pulse:  [72-96] 88  Resp:  [16-19] 17  SpO2:  [90 %-93 %] 90 %  BP: ()/(52-81) 112/62     Weight: 80.3 kg (177 lb)  Body mass index is 31.35 kg/m².  No intake or output data in the 24 hours ending 07/26/22 1424   Physical Exam  HENT:      Head: Normocephalic and atraumatic.      Right Ear: External ear normal.      Left Ear: External ear normal.      Nose: Nose normal.      Mouth/Throat:      Mouth: Mucous membranes are moist.      Pharynx: Oropharynx is clear.   Eyes:      General: No scleral icterus.     Extraocular Movements: Extraocular movements intact.      Conjunctiva/sclera: Conjunctivae normal.   Cardiovascular:      Rate and Rhythm: Normal rate and regular rhythm.      Heart sounds: No murmur heard.  Pulmonary:      Effort: Pulmonary effort is normal.      Breath sounds: Normal breath sounds. No wheezing or  rales.   Abdominal:      General: Abdomen is flat. Bowel sounds are normal. There is no distension.      Palpations: Abdomen is soft.      Tenderness: There is no abdominal tenderness.   Musculoskeletal:         General: No swelling. Normal range of motion.      Cervical back: Normal range of motion.   Skin:     General: Skin is warm and dry.   Neurological:      General: No focal deficit present.      Mental Status: She is alert.   Psychiatric:      Comments: Flight of ideas,delusions, pressured speech improved but still present.       Significant Labs: All pertinent labs within the past 24 hours have been reviewed.  Recent Lab Results         07/26/22  0444        Albumin 3.4       Alkaline Phosphatase 97       ALT 12       Anion Gap 13       AST 18       Baso # 0.06       Basophil % 0.9       BILIRUBIN TOTAL 0.3  Comment: For infants and newborns, interpretation of results should be based  on gestational age, weight and in agreement with clinical  observations.    Premature Infant recommended reference ranges:  Up to 24 hours.............<8.0 mg/dL  Up to 48 hours............<12.0 mg/dL  3-5 days..................<15.0 mg/dL  6-29 days.................<15.0 mg/dL         BUN 30       Calcium 9.8       Chloride 96       CO2 23       Creatinine 0.8       Differential Method Automated       eGFR if  >60.0       eGFR if non  >60.0  Comment: Calculation used to obtain the estimated glomerular filtration  rate (eGFR) is the CKD-EPI equation.          Eos # 0.4       Eosinophil % 5.4       Glucose 191       Gran # (ANC) 4.3       Gran % 65.5       Hematocrit 41.4       Hemoglobin 13.4       Immature Grans (Abs) 0.03  Comment: Mild elevation in immature granulocytes is non specific and   can be seen in a variety of conditions including stress response,   acute inflammation, trauma and pregnancy. Correlation with other   laboratory and clinical findings is essential.         Immature  Granulocytes 0.5       Lymph # 1.2       Lymph % 17.3       Magnesium 2.2       MCH 28.2       MCHC 32.4       MCV 87       Mono # 0.7       Mono % 10.4       MPV 10.7       nRBC 0       Phosphorus 4.3       Platelets 230       Potassium 3.3       PROTEIN TOTAL 6.9       RBC 4.75       RDW 13.0       Sodium 132       WBC 6.63               Significant Imaging: I have reviewed all pertinent imaging results/findings within the past 24 hours.      Assessment/Plan:      * Drug-induced delirium  Ms. Arcos is a 89 yo that  according to daughter after having a 3-4 weeks ago COPD exacerbation, was started on Prednisone by her son who is a nurse. States  that after a few days she started noticing behavioral changes described as flight of ideas, insomnia, rapid speech, alternating irritability with excitability, grandiose delusions auditory hallucinations, fixation with allegories, numerology and Christianity.     Differential includes but not limited to drug induced delirium like from recent steroids use vs manic episode from mood disorder. Unlikely to be 2/2 to infection given normal WBC and lacks of infectious symptoms. UA is also negative. UDS is also negative. TSH is normal, low likely mroan for thyroid condition     - Psychiatry recommends changing seroquel to risperidal at night. 7/25  -Risperidal dose increased form .5 to 1mg  - Continue to monitor mood and mental status   - Delirium precautions   - Holding further steroids       COPD exacerbation  - Not in exacerbation at this time   - Duo Neb PRN   -Avoid Steroid nebulizer.    Mood disorder due to a general medical condition  See drug induced delirium     Sensorineural hearing loss (SNHL) of both ears        Hypertension  Home regimen: amlodipine 2.5 mg QD, HCTZ 25 mg QD, Losartan 100 mg QD     Plan: Blood pressure dropped overnight, home meds held except for losartan  -       Hx of breast cancer  S/p right lumpectomy and radiation       VTE Risk Mitigation (From  admission, onward)         Ordered     enoxaparin injection 40 mg  Daily         07/24/22 1332     IP VTE HIGH RISK PATIENT  Once         07/24/22 1332     Place sequential compression device  Until discontinued         07/24/22 1332                Discharge Planning   ANA: 7/27/2022     Code Status: Full Code   Is the patient medically ready for discharge?: No    Reason for patient still in hospital (select all that apply): Patient trending condition  Discharge Plan A: Home with family                  Gadiel Grajeda MD  Department of Hospital Medicine   [facility]

## 2022-07-26 NOTE — ASSESSMENT & PLAN NOTE
ASSESSMENT     Carmina Arcos is a 89 y.o. female with a past psychiatric history of SIPD, who presented to the [facility] due to manic behavior. Her symptoms of insomnia, hyperactivity, impulsivity, and delusions are likely 2/2 recent course of steroids. However, these symptoms are endangering her and have been difficult for her family to manage, especially due to the delay in seeing an outpatient provider. Pt would benefit from hospital admission to psychiatrically stabilize, reinstate sleep-wake cycle, rule out medical etiologies of current symptoms, and stay safe while substances continue to wash out.    IMPRESSION  Substance-induced psychotic disorder  R/o bipolar disorder    RECOMMENDATION(S)      1. Scheduled Medication(s):  - Increase Risperdal 0.5 mg to 1 mg PO QHS for acute sujata    2. PRN Medication(s):  - Risperdal 0.5 mg PO q8hrs PRN non-redirectable agitation    3.  Monitor:  - Please obtain daily EKG to monitor QTc    4. Legal Status/Precaution(s):  - Patient does not meet criteria for PEC at this time. Patient does appear gravely disabled due to a psychiatric illness but is help-seeking and not contesting her admission.  - Recommend fall precautions and strict bed rest with alarm on, low bed height, to avoid falls due to potential orthostatic hypotension secondary to Risperdal in this elderly patient.      This patient was seen and discussed with Dr. Jefferson. Thank you for the consult - Psychiatry will continue to follow. Please reach out with any questions/concerns.

## 2022-07-26 NOTE — PLAN OF CARE
Problem: Occupational Therapy  Goal: Occupational Therapy Goal  Description: Goals to be met by: 8/13/2022     Patient will increase functional independence with ADLs by performing:    Grooming while standing at sink with Modified [place].  Toileting from toilet with Modified [place] for hygiene and clothing management.   Supine to sit with Modified [place].  Stand pivot transfers with Modified [place].  Toilet transfer to toilet with Modified [place].    Outcome: Ongoing, Progressing   Patient's goals are set.

## 2022-07-26 NOTE — ASSESSMENT & PLAN NOTE
Home regimen: amlodipine 2.5 mg QD, HCTZ 25 mg QD, Losartan 100 mg QD     Plan: Blood pressure dropped overnight, home meds held except for losartan  -

## 2022-07-26 NOTE — SUBJECTIVE & OBJECTIVE
"    Family History    None       Tobacco Use    Smoking status: Current Every Day Smoker     Types: Cigarettes    Smokeless tobacco: Never Used    Tobacco comment: smoked for 30-35 years per daughter quit many years ago   Substance and Sexual Activity    Alcohol use: Yes     Comment: socially less than one a month    Drug use: No    Sexual activity: Not Currently     Psychotherapeutics (From admission, onward)                Start     Stop Route Frequency Ordered    07/25/22 2100  risperiDONE tablet 0.5 mg         -- Oral Nightly 07/25/22 1542    07/25/22 1641  risperiDONE tablet 0.5 mg         -- Oral Every 8 hours PRN 07/25/22 1542               Objective:     Vital Signs (Most Recent):  Temp: 97.9 °F (36.6 °C) (07/26/22 0744)  Pulse: 79 (07/26/22 0744)  Resp: 16 (07/26/22 0744)  BP: (!) 95/52 (07/26/22 0744)  SpO2: (!) 93 % (07/26/22 0744)   Vital Signs (24h Range):  Temp:  [97 °F (36.1 °C)-98.8 °F (37.1 °C)] 97.9 °F (36.6 °C)  Pulse:  [] 79  Resp:  [16-20] 16  SpO2:  [90 %-93 %] 93 %  BP: ()/(52-81) 95/52     Height: 5' 3" (160 cm)  Weight: 80.3 kg (177 lb)  Body mass index is 31.35 kg/m².    No intake or output data in the 24 hours ending 07/26/22 1002    MSE:  Orientation: oriented to person, place, time, situation  Speech: somewhat less pressured, still fast and interruptive  Language: fluent  Mood: euthymic  Affect: elevated, labile  Thought Process: racing, FOI, tangential  Associations: loose  Thought Content: No SI/HI/AVH. +delusions, grandiose, hyperreligious  Memory: Recent intact, Remote intact  Attention and Concentration: Distractible  Fund of Knowledge: Appropriate for education level  Insight: Good  Judgment: Limited    Significant Labs: Last 24 Hours:   Recent Lab Results         07/26/22  0444        Albumin 3.4       Alkaline Phosphatase 97       ALT 12       Anion Gap 13       AST 18       Baso # 0.06       Basophil % 0.9       BILIRUBIN TOTAL 0.3  Comment: For infants and newborns, " interpretation of results should be based  on gestational age, weight and in agreement with clinical  observations.    Premature Infant recommended reference ranges:  Up to 24 hours.............<8.0 mg/dL  Up to 48 hours............<12.0 mg/dL  3-5 days..................<15.0 mg/dL  6-29 days.................<15.0 mg/dL         BUN 30       Calcium 9.8       Chloride 96       CO2 23       Creatinine 0.8       Differential Method Automated       eGFR if  >60.0       eGFR if non  >60.0  Comment: Calculation used to obtain the estimated glomerular filtration  rate (eGFR) is the CKD-EPI equation.          Eos # 0.4       Eosinophil % 5.4       Glucose 191       Gran # (ANC) 4.3       Gran % 65.5       Hematocrit 41.4       Hemoglobin 13.4       Immature Grans (Abs) 0.03  Comment: Mild elevation in immature granulocytes is non specific and   can be seen in a variety of conditions including stress response,   acute inflammation, trauma and pregnancy. Correlation with other   laboratory and clinical findings is essential.         Immature Granulocytes 0.5       Lymph # 1.2       Lymph % 17.3       Magnesium 2.2       MCH 28.2       MCHC 32.4       MCV 87       Mono # 0.7       Mono % 10.4       MPV 10.7       nRBC 0       Phosphorus 4.3       Platelets 230       Potassium 3.3       PROTEIN TOTAL 6.9       RBC 4.75       RDW 13.0       Sodium 132       WBC 6.63               Significant Imaging: None

## 2022-07-26 NOTE — ASSESSMENT & PLAN NOTE
Ms. Arcos is a 89 yo that  according to daughter after having a 3-4 weeks ago COPD exacerbation, was started on Prednisone by her son who is a nurse. States  that after a few days she started noticing behavioral changes described as flight of ideas, insomnia, rapid speech, alternating irritability with excitability, grandiose delusions auditory hallucinations, fixation with allegories, numerology and Baptism.     Differential includes but not limited to drug induced delirium like from recent steroids use vs manic episode from mood disorder. Unlikely to be 2/2 to infection given normal WBC and lacks of infectious symptoms. UA is also negative. UDS is also negative. TSH is normal, low likely moran for thyroid condition     - Psychiatry recommends changing seroquel to risperidal at night. 7/25  -Risperidal dose increased form .5 to 1mg  - Continue to monitor mood and mental status   - Delirium precautions   - Holding further steroids

## 2022-07-26 NOTE — PROGRESS NOTES
"[facility] Surg  Psychiatry  Progress Note    Patient Name: Carmina Arcos  MRN: [id number]   Code Status: Full Code  Admission Date: 7/24/2022  Hospital Length of Stay: 0 days  Expected Discharge Date: 7/27/2022  Attending Physician: Susie Wynne MD  Primary Care Provider: Dick Graham MD    Current Legal Status: Uncontested    Patient information was obtained from patient, ER records and primary team.       Subjective:     Patient is a 89 y.o., female, presents with:    Principal Problem:Drug-induced delirium    Chief Complaint: substance-induced sujata/psychosis    HPI:   Carmina Arcos is a 89 y.o. female with a past psychiatric history of substance-induced psychotic disorder who presented to [facility] due to Manic behavior. Psychiatry was consulted for "sujata x several days, multiple ED visits".    Per Primary Team:  89 yo W with pmhx COPD, anxiety, depression, HTN, breast CA presents with family for psych evaluation.  History is assisted by the patient and the patient's daughter.  They note that patient was having coughing flares of COPD 3 weeks ago and she was started on course of prednisone by a family member.  She was on the prednisone for almost 2 weeks.  The prednisone has since discontinued that she continues to have manic symptoms including insomnia, reckless spending behavior, typical references Septra.  She was seen in the emergency department several days ago and discharged home with close observation by family members and outpatient psychiatry follow-up.  That follow-up is in 6 days.  Family states that she will need a sooner psychiatric evaluation.  She did have history of manic episode many years ago in the setting of taking a diet pill.  She was admitted inpatient time.  The patient does report some foul-smelling odor in her urine and is concerned that she has a kidney infection.  Otherwise she is essentially without complaints.     The history is provided by the patient and a relative. No " " was used.     Per Psychiatry:  Patient was seen in the ED with daughter at bedside; pt and daughter provided history together. Pt was alert, oriented x4, rapid pressured interruptive speech, euthymic affect. They report about a week of psychiatric symptoms including excitability/irritability, anxiety, insomnia, hyperactivity, visual hallucinations, hyperreligious and grandiose delusions. Pt has been preoccupied with numerology and believes her family is part of a biblical story and that she has been in the [facility] with L[facility]. She has also been impulsively shopping and spending large amounts of money. She has intermittent confusion, FOI, BALA on exam.    Pt has a history of COPD and started a tapered 2-wk course of PO Prednisone for a presumed exacerbation, which she got from her son (a nurse and RT). Before the course was completed, the pt's psychiatric symptoms started as described above. Family was concerned that the Prednisone may be causing her symptoms but they could not convince her to stop taking it. They presented to the ED several times since then and were initially comfortable with outpatient follow-up with pt's former psychiatrist, Dr. Ceballos, on Friday 7/29. However, they have become increasingly concerned with pt's symptoms and feel she may be a danger to herself.     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 She speaks several languages and has no known history of cognitive decline. Her psychiatric history includes one substance-induced psychotic episode in 1999. She took some stimulant diet pills, developed insomnia and visual hallucinations, and was admitted to [facility]. She was started on Risperdal and Zoloft, which she continued to see Dr. Ceballos for for some time. Eventually her symptoms resolved and she went back to her "relatively high-strung" baseline, per daughter. She has been out of Dr." "Lin's care and not having psychiatric issues for the past 10+ years. Pt's daughter also reports a remote suicide attempt over 30 yrs ago, via Benadryl overdose.    Prior to the course of Prednisone, pt was at baseline with none of her current symptoms. She last took Prednisone on Wednesday 7/20 but the symptoms have persisted. Pt and family are amenable to inpt hospitalization for her current symptoms.    Medical Review of Systems:  Complete review of systems performed covering Constitutional, Eyes, ENT/Mouth, Cardiovascular, Respiratory, Gastrointestinal, Genitourinary, Musculoskeletal, Skin, Neurologic, Endocrine, Heme/Lymph, and Allergy/Immune.     Complete review of systems was negative with the exception of the following positive symptoms: "funny feeling" in pt's gums/teeth    Psychiatric Review of Systems-is patient experiencing or having changes in  sleep: yes, insomnia  appetite: no  weight: no  energy/anergy: yes, hyperactive  interest/pleasure/anhedonia: no  somatic symptoms: no  libido: no  anxiety/panic: yes  guilty/hopelessness: no  concentration: yes  S.I.B.s/risky behavior: yes, impulsive shopping  any drugs: no  alcohol: no     Allergies:  Penicillins    Past Medical/Surgical History:  Past Medical History:   Diagnosis Date    Anxiety     Breast cancer 2000    right (lumpectomy and radiation)    Broken bones     Colon cancer     COPD (chronic obstructive pulmonary disease)     Depression     depression (no meds now)    Hypertension      Past Surgical History:   Procedure Laterality Date    APPENDECTOMY      BREAST LUMPECTOMY Right     colon operation       HYSTERECTOMY      Pt states still have ovaries     TONSILLECTOMY         Past Psychiatric History:  Previous Medication Trials:  yes, Risperdal and Zoloft    Previous Psychiatric Hospitalizations: yes   Previous Suicide Attempts: yes   History of Violence: no  Outpatient Psychiatrist:  Dr. Ceballos (formerly, with plans to " re-establish)    Social History:  Marital Status:   Children: 3   Employment Status/Info: retired  Education: college graduate  Special Ed: no  Housing Status: lives alone  History of phys/sexual abuse: no  Access to gun: no    Substance Abuse History:  Recreational Drugs:  denied  Use of Alcohol: occasional, social use  Rehab History: no   Tobacco Use: no  Use of OTC: denied    Legal History:  Past Charges/Incarcerations: no   Pending charges: no     Family Psychiatric History:   denied    Psychosocial Stressors: health  Functioning Relationships: good support system and poor relationship with children      Hospital Course: 7/25/22:  Patient received first dose of scheduled Seroquel 50 mg PO QHS overnight, but had poor sleep, frequently getting up from bed and becoming agitated regarding delusional thought content (per daughter and nursing). Patient seen sitting up at bedside this morning eating a breakfast tray. Her affect was labile, ranging from angry/irritable to tearful to laughing/excited. She continues to have hyperreligious thoughts, is fixated on the thermostat in her room, and intermittently anxious/agitated per daughter. She continues to have FOI, pressured speech, but is also disoriented today. She acknowledges feeling confused and is reorientable, but listed time and place as [address] [place] [address].    7/26/22:  Patient's QHS Seroquel was discontinued and she received her first dose of Risperdal 0.5 mg PO QHS overnight. She reported only getting about a half hour of sleep because she needed to stay up all night to comfort [facility] in the [facility]. Pt hyperfixated on biblical content. Still interruptive but speech less pressures and rapid. She is oriented to person, place, date, and situation. She complained of a headache this morning and planned to drink more water today as she feels dehydrated. She continues to be help-seeking and wants to be in the hospital until she starts  "to feel like herself again.          Family History    None       Tobacco Use    Smoking status: Current Every Day Smoker     Types: Cigarettes    Smokeless tobacco: Never Used    Tobacco comment: smoked for 30-35 years per daughter quit many years ago   Substance and Sexual Activity    Alcohol use: Yes     Comment: socially less than one a month    Drug use: No    Sexual activity: Not Currently     Psychotherapeutics (From admission, onward)                Start     Stop Route Frequency Ordered    07/25/22 2100  risperiDONE tablet 0.5 mg         -- Oral Nightly 07/25/22 1542    07/25/22 1641  risperiDONE tablet 0.5 mg         -- Oral Every 8 hours PRN 07/25/22 1542               Objective:     Vital Signs (Most Recent):  Temp: 97.9 °F (36.6 °C) (07/26/22 0744)  Pulse: 79 (07/26/22 0744)  Resp: 16 (07/26/22 0744)  BP: (!) 95/52 (07/26/22 0744)  SpO2: (!) 93 % (07/26/22 0744)   Vital Signs (24h Range):  Temp:  [97 °F (36.1 °C)-98.8 °F (37.1 °C)] 97.9 °F (36.6 °C)  Pulse:  [] 79  Resp:  [16-20] 16  SpO2:  [90 %-93 %] 93 %  BP: ()/(52-81) 95/52     Height: 5' 3" (160 cm)  Weight: 80.3 kg (177 lb)  Body mass index is 31.35 kg/m².    No intake or output data in the 24 hours ending 07/26/22 1002    MSE:  Orientation: oriented to person, place, time, situation  Speech: somewhat less pressured, still fast and interruptive  Language: fluent  Mood: euthymic  Affect: elevated, labile  Thought Process: racing, FOI, tangential  Associations: loose  Thought Content: No SI/HI/AVH. +delusions, grandiose, hyperreligious  Memory: Recent intact, Remote intact  Attention and Concentration: Distractible  Fund of Knowledge: Appropriate for education level  Insight: Good  Judgment: Limited    Significant Labs: Last 24 Hours:   Recent Lab Results         07/26/22  0444        Albumin 3.4       Alkaline Phosphatase 97       ALT 12       Anion Gap 13       AST 18       Baso # 0.06       Basophil % 0.9       BILIRUBIN TOTAL " 0.3  Comment: For infants and newborns, interpretation of results should be based  on gestational age, weight and in agreement with clinical  observations.    Premature Infant recommended reference ranges:  Up to 24 hours.............<8.0 mg/dL  Up to 48 hours............<12.0 mg/dL  3-5 days..................<15.0 mg/dL  6-29 days.................<15.0 mg/dL         BUN 30       Calcium 9.8       Chloride 96       CO2 23       Creatinine 0.8       Differential Method Automated       eGFR if  >60.0       eGFR if non  >60.0  Comment: Calculation used to obtain the estimated glomerular filtration  rate (eGFR) is the CKD-EPI equation.          Eos # 0.4       Eosinophil % 5.4       Glucose 191       Gran # (ANC) 4.3       Gran % 65.5       Hematocrit 41.4       Hemoglobin 13.4       Immature Grans (Abs) 0.03  Comment: Mild elevation in immature granulocytes is non specific and   can be seen in a variety of conditions including stress response,   acute inflammation, trauma and pregnancy. Correlation with other   laboratory and clinical findings is essential.         Immature Granulocytes 0.5       Lymph # 1.2       Lymph % 17.3       Magnesium 2.2       MCH 28.2       MCHC 32.4       MCV 87       Mono # 0.7       Mono % 10.4       MPV 10.7       nRBC 0       Phosphorus 4.3       Platelets 230       Potassium 3.3       PROTEIN TOTAL 6.9       RBC 4.75       RDW 13.0       Sodium 132       WBC 6.63               Significant Imaging: None       Scheduled Medications:   enoxaparin  40 mg Subcutaneous Daily    famotidine  20 mg Oral Daily    levothyroxine  50 mcg Oral Before breakfast    melatonin  6 mg Oral Nightly    risperiDONE  0.5 mg Oral QHS       PRN Medications:  acetaminophen, albuterol-ipratropium, dextrose 10%, dextrose 10%, glucagon (human recombinant), glucose, glucose, naloxone, risperiDONE, sodium chloride 0.9%    Review of patient's allergies indicates:   Allergen  Reactions    Penicillins Hives and Shortness Of Breath       Assessment/Plan:     * Drug-induced delirium  ASSESSMENT     Carmina Arcos is a 89 y.o. female with a past psychiatric history of SIPD, who presented to the [facility] due to manic behavior. Her symptoms of insomnia, hyperactivity, impulsivity, and delusions are likely 2/2 recent course of steroids. However, these symptoms are endangering her and have been difficult for her family to manage, especially due to the delay in seeing an outpatient provider. Pt would benefit from hospital admission to psychiatrically stabilize, reinstate sleep-wake cycle, rule out medical etiologies of current symptoms, and stay safe while substances continue to wash out.    IMPRESSION  Substance-induced psychotic disorder  R/o bipolar disorder    RECOMMENDATION(S)      1. Scheduled Medication(s):  - Increase Risperdal 0.5 mg to 1 mg PO QHS for acute sujata    2. PRN Medication(s):  - Risperdal 0.5 mg PO q8hrs PRN non-redirectable agitation    3.  Monitor:  - Please obtain daily EKG to monitor QTc    4. Legal Status/Precaution(s):  - Patient does not meet criteria for PEC at this time. Patient does appear gravely disabled due to a psychiatric illness but is help-seeking and not contesting her admission.  - Recommend fall precautions and strict bed rest with alarm on, low bed height, to avoid falls due to potential orthostatic hypotension secondary to Risperdal in this elderly patient.      This patient was seen and discussed with Dr. Jefferson. Thank you for the consult - Psychiatry will continue to follow. Please reach out with any questions/concerns.           Need for Continued Hospitalization:  No need for inpatient psychiatric hospitalization. Continue medical care as per the primary team.    Anticipated Disposition:  Home or Self Care vs SNF    Total time:  35 with greater than 50% of this time spent in counseling and/or coordination of care.       Alba Tyson MD    Psychiatry  [facility]

## 2022-07-26 NOTE — SUBJECTIVE & OBJECTIVE
Interval History:    No acute events over. Affirms that she slept more than the previous night although not as profound   Her pressured speech and flight of ideas have improved per daughter. She also states a leg cramp during the night that was self limited.    Review of Systems   Constitutional:  Negative for chills and fever.   HENT:  Negative for rhinorrhea, sinus pain and sore throat.    Respiratory:  Negative for cough and chest tightness.    Cardiovascular:  Negative for chest pain.   Gastrointestinal:  Negative for abdominal distention, abdominal pain, constipation and diarrhea.   Endocrine: Negative for cold intolerance.   Genitourinary:  Negative for difficulty urinating.   Musculoskeletal: Negative.    Skin: Negative.    Neurological:  Negative for seizures and headaches.   Psychiatric/Behavioral: Negative.     Objective:     Vital Signs (Most Recent):  Temp: 98.5 °F (36.9 °C) (07/26/22 1239)  Pulse: 88 (07/26/22 1239)  Resp: 17 (07/26/22 1239)  BP: 112/62 (07/26/22 1239)  SpO2: (!) 90 % (07/26/22 1239)   Vital Signs (24h Range):  Temp:  [97 °F (36.1 °C)-98.8 °F (37.1 °C)] 98.5 °F (36.9 °C)  Pulse:  [72-96] 88  Resp:  [16-19] 17  SpO2:  [90 %-93 %] 90 %  BP: ()/(52-81) 112/62     Weight: 80.3 kg (177 lb)  Body mass index is 31.35 kg/m².  No intake or output data in the 24 hours ending 07/26/22 1424   Physical Exam  HENT:      Head: Normocephalic and atraumatic.      Right Ear: External ear normal.      Left Ear: External ear normal.      Nose: Nose normal.      Mouth/Throat:      Mouth: Mucous membranes are moist.      Pharynx: Oropharynx is clear.   Eyes:      General: No scleral icterus.     Extraocular Movements: Extraocular movements intact.      Conjunctiva/sclera: Conjunctivae normal.   Cardiovascular:      Rate and Rhythm: Normal rate and regular rhythm.      Heart sounds: No murmur heard.  Pulmonary:      Effort: Pulmonary effort is normal.      Breath sounds: Normal breath sounds. No  wheezing or rales.   Abdominal:      General: Abdomen is flat. Bowel sounds are normal. There is no distension.      Palpations: Abdomen is soft.      Tenderness: There is no abdominal tenderness.   Musculoskeletal:         General: No swelling. Normal range of motion.      Cervical back: Normal range of motion.   Skin:     General: Skin is warm and dry.   Neurological:      General: No focal deficit present.      Mental Status: She is alert.   Psychiatric:      Comments: Flight of ideas,delusions, pressured speech improved but still present.       Significant Labs: All pertinent labs within the past 24 hours have been reviewed.  Recent Lab Results         07/26/22  0444        Albumin 3.4       Alkaline Phosphatase 97       ALT 12       Anion Gap 13       AST 18       Baso # 0.06       Basophil % 0.9       BILIRUBIN TOTAL 0.3  Comment: For infants and newborns, interpretation of results should be based  on gestational age, weight and in agreement with clinical  observations.    Premature Infant recommended reference ranges:  Up to 24 hours.............<8.0 mg/dL  Up to 48 hours............<12.0 mg/dL  3-5 days..................<15.0 mg/dL  6-29 days.................<15.0 mg/dL         BUN 30       Calcium 9.8       Chloride 96       CO2 23       Creatinine 0.8       Differential Method Automated       eGFR if  >60.0       eGFR if non  >60.0  Comment: Calculation used to obtain the estimated glomerular filtration  rate (eGFR) is the CKD-EPI equation.          Eos # 0.4       Eosinophil % 5.4       Glucose 191       Gran # (ANC) 4.3       Gran % 65.5       Hematocrit 41.4       Hemoglobin 13.4       Immature Grans (Abs) 0.03  Comment: Mild elevation in immature granulocytes is non specific and   can be seen in a variety of conditions including stress response,   acute inflammation, trauma and pregnancy. Correlation with other   laboratory and clinical findings is essential.          Immature Granulocytes 0.5       Lymph # 1.2       Lymph % 17.3       Magnesium 2.2       MCH 28.2       MCHC 32.4       MCV 87       Mono # 0.7       Mono % 10.4       MPV 10.7       nRBC 0       Phosphorus 4.3       Platelets 230       Potassium 3.3       PROTEIN TOTAL 6.9       RBC 4.75       RDW 13.0       Sodium 132       WBC 6.63               Significant Imaging: I have reviewed all pertinent imaging results/findings within the past 24 hours.

## 2022-07-26 NOTE — PLAN OF CARE
Spoke with patient in the room to see if she is wanting to go to snf, she refused snf and wants home health instead.  Will put in home health referral.      Khadra Robison RN [facility] [phone]

## 2022-07-27 PROCEDURE — 63600175 PHARM REV CODE 636 W HCPCS: Performed by: STUDENT IN AN ORGANIZED HEALTH CARE EDUCATION/TRAINING PROGRAM

## 2022-07-27 PROCEDURE — 99225 PR SUBSEQUENT OBSERVATION CARE,LEVEL II: ICD-10-PCS | Mod: GC,,, | Performed by: HOSPITALIST

## 2022-07-27 PROCEDURE — 96372 THER/PROPH/DIAG INJ SC/IM: CPT | Performed by: STUDENT IN AN ORGANIZED HEALTH CARE EDUCATION/TRAINING PROGRAM

## 2022-07-27 PROCEDURE — 99225 PR SUBSEQUENT OBSERVATION CARE,LEVEL II: CPT | Mod: GC,,, | Performed by: HOSPITALIST

## 2022-07-27 PROCEDURE — 25000003 PHARM REV CODE 250: Performed by: STUDENT IN AN ORGANIZED HEALTH CARE EDUCATION/TRAINING PROGRAM

## 2022-07-27 PROCEDURE — 25000003 PHARM REV CODE 250: Performed by: HOSPITALIST

## 2022-07-27 PROCEDURE — G0378 HOSPITAL OBSERVATION PER HR: HCPCS

## 2022-07-27 RX ORDER — TALC
9 POWDER (GRAM) TOPICAL NIGHTLY
Status: DISCONTINUED | OUTPATIENT
Start: 2022-07-27 | End: 2022-07-28

## 2022-07-27 RX ORDER — RISPERIDONE 0.5 MG/1
0.5 TABLET, ORALLY DISINTEGRATING ORAL EVERY 8 HOURS PRN
Status: DISCONTINUED | OUTPATIENT
Start: 2022-07-27 | End: 2022-08-02

## 2022-07-27 RX ORDER — OLANZAPINE 10 MG/2ML
2.5 INJECTION, POWDER, FOR SOLUTION INTRAMUSCULAR ONCE AS NEEDED
Status: DISCONTINUED | OUTPATIENT
Start: 2022-07-27 | End: 2022-07-27

## 2022-07-27 RX ORDER — LANOLIN ALCOHOL/MO/W.PET/CERES
400 CREAM (GRAM) TOPICAL DAILY
COMMUNITY

## 2022-07-27 RX ORDER — OLANZAPINE 10 MG/2ML
2.5 INJECTION, POWDER, FOR SOLUTION INTRAMUSCULAR EVERY 8 HOURS PRN
Status: DISCONTINUED | OUTPATIENT
Start: 2022-07-27 | End: 2022-07-28

## 2022-07-27 RX ORDER — DOXYLAMINE SUCCINATE 25 MG
TABLET ORAL 2 TIMES DAILY
Status: DISCONTINUED | OUTPATIENT
Start: 2022-07-27 | End: 2022-08-09 | Stop reason: HOSPADM

## 2022-07-27 RX ORDER — DICLOFENAC SODIUM 10 MG/G
2 GEL TOPICAL DAILY PRN
COMMUNITY

## 2022-07-27 RX ADMIN — RISPERIDONE 1 MG: 1 TABLET ORAL at 08:07

## 2022-07-27 RX ADMIN — FAMOTIDINE 20 MG: 20 TABLET ORAL at 10:07

## 2022-07-27 RX ADMIN — MICONAZOLE NITRATE: 20 CREAM TOPICAL at 09:07

## 2022-07-27 RX ADMIN — LOSARTAN POTASSIUM 100 MG: 50 TABLET, FILM COATED ORAL at 10:07

## 2022-07-27 RX ADMIN — LEVOTHYROXINE SODIUM 50 MCG: 50 TABLET ORAL at 05:07

## 2022-07-27 RX ADMIN — MICONAZOLE NITRATE: 20 CREAM TOPICAL at 02:07

## 2022-07-27 RX ADMIN — ENOXAPARIN SODIUM 40 MG: 40 INJECTION SUBCUTANEOUS at 04:07

## 2022-07-27 RX ADMIN — ACETAMINOPHEN 650 MG: 325 TABLET ORAL at 04:07

## 2022-07-27 NOTE — PROGRESS NOTES
"[facility] Surg  Psychiatry  Progress Note    Patient Name: Carmina Arcos  MRN: [id number]   Code Status: Full Code  Admission Date: 7/24/2022  Hospital Length of Stay: 0 days  Expected Discharge Date: 7/29/2022  Attending Physician: Susie Wynne MD  Primary Care Provider: Dick Graham MD    Current Legal Status: Uncontested    Patient information was obtained from patient, relative(s), past medical records, ER records and primary team.       Subjective:     Patient is a 89 y.o., female, presents with:    Principal Problem:Acute psychosis    Chief Complaint: sujata/psychosis    HPI:   Carmina Arcos is a 89 y.o. female with a past psychiatric history of substance-induced psychotic disorder who presented to [facility] due to Manic behavior. Psychiatry was consulted for "sujata x several days, multiple ED visits".    Per Primary Team:  89 yo W with pmhx COPD, anxiety, depression, HTN, breast CA presents with family for psych evaluation.  History is assisted by the patient and the patient's daughter.  They note that patient was having coughing flares of COPD 3 weeks ago and she was started on course of prednisone by a family member.  She was on the prednisone for almost 2 weeks.  The prednisone has since discontinued that she continues to have manic symptoms including insomnia, reckless spending behavior, typical references Septra.  She was seen in the emergency department several days ago and discharged home with close observation by family members and outpatient psychiatry follow-up.  That follow-up is in 6 days.  Family states that she will need a sooner psychiatric evaluation.  She did have history of manic episode many years ago in the setting of taking a diet pill.  She was admitted inpatient time.  The patient does report some foul-smelling odor in her urine and is concerned that she has a kidney infection.  Otherwise she is essentially without complaints.     The history is provided by the patient and a " "relative. No  was used.     Per Psychiatry:  Patient was seen in the ED with daughter at bedside; pt and daughter provided history together. Pt was alert, oriented x4, rapid pressured interruptive speech, euthymic affect. They report about a week of psychiatric symptoms including excitability/irritability, anxiety, insomnia, hyperactivity, visual hallucinations, hyperreligious and grandiose delusions. Pt has been preoccupied with numerology and believes her family is part of a biblical story and that she has been in the [facility] with Lázaro. She has also been impulsively shopping and spending large amounts of money. She has intermittent confusion, FOI, BALA on exam.    Pt has a history of COPD and started a tapered 2-wk course of PO Prednisone for a presumed exacerbation, which she got from her son (a nurse and RT). Before the course was completed, the pt's psychiatric symptoms started as described above. Family was concerned that the Prednisone may be causing her symptoms but they could not convince her to stop taking it. They presented to the ED several times since then and were initially comfortable with outpatient follow-up with pt's former psychiatrist, Dr. Ceballos, on Friday 7/29. However, they have become increasingly concerned with pt's symptoms and feel she may be a danger to herself.     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 She speaks several languages and has no known history of cognitive decline. Her psychiatric history includes one substance-induced psychotic episode in 1999. She took some stimulant diet pills, developed insomnia and visual hallucinations, and was admitted to [place]. She was started on Risperdal and Zoloft, which she continued to see Dr. Ceballos for for some time. Eventually her symptoms resolved and she went back to her "relatively high-strung" baseline, per daughter. She has been out " "of Dr. Ceballos's care and not having psychiatric issues for the past 10+ years. Pt's daughter also reports a remote suicide attempt over 30 yrs ago, via Benadryl overdose.    Prior to the course of Prednisone, pt was at baseline with none of her current symptoms. She last took Prednisone on Wednesday 7/20 but the symptoms have persisted. Pt and family are amenable to inpt hospitalization for her current symptoms.    Medical Review of Systems:  Complete review of systems performed covering Constitutional, Eyes, ENT/Mouth, Cardiovascular, Respiratory, Gastrointestinal, Genitourinary, Musculoskeletal, Skin, Neurologic, Endocrine, Heme/Lymph, and Allergy/Immune.     Complete review of systems was negative with the exception of the following positive symptoms: "funny feeling" in pt's gums/teeth    Psychiatric Review of Systems-is patient experiencing or having changes in  sleep: yes, insomnia  appetite: no  weight: no  energy/anergy: yes, hyperactive  interest/pleasure/anhedonia: no  somatic symptoms: no  libido: no  anxiety/panic: yes  guilty/hopelessness: no  concentration: yes  S.I.B.s/risky behavior: yes, impulsive shopping  any drugs: no  alcohol: no     Allergies:  Penicillins    Past Medical/Surgical History:  Past Medical History:   Diagnosis Date    Anxiety     Breast cancer 2000    right (lumpectomy and radiation)    Broken bones     Colon cancer     COPD (chronic obstructive pulmonary disease)     Depression     depression (no meds now)    Hypertension      Past Surgical History:   Procedure Laterality Date    APPENDECTOMY      BREAST LUMPECTOMY Right     colon operation       HYSTERECTOMY      Pt states still have ovaries     TONSILLECTOMY         Past Psychiatric History:  Previous Medication Trials:  yes, Risperdal and Zoloft    Previous Psychiatric Hospitalizations: yes   Previous Suicide Attempts: yes   History of Violence: no  Outpatient Psychiatrist:  Dr. Ceballos (formerly, with plans to " re-establish)    Social History:  Marital Status:   Children: 3   Employment Status/Info: retired  Education: college graduate  Special Ed: no  Housing Status: lives alone  History of phys/sexual abuse: no  Access to gun: no    Substance Abuse History:  Recreational Drugs:  denied  Use of Alcohol: occasional, social use  Rehab History: no   Tobacco Use: no  Use of OTC: denied    Legal History:  Past Charges/Incarcerations: no   Pending charges: no     Family Psychiatric History:   denied    Psychosocial Stressors: health  Functioning Relationships: good support system and poor relationship with children      Hospital Course: 7/25/22:  Patient received first dose of scheduled Seroquel 50 mg PO QHS overnight, but had poor sleep, frequently getting up from bed and becoming agitated regarding delusional thought content (per daughter and nursing). Patient seen sitting up at bedside this morning eating a breakfast tray. Her affect was labile, ranging from angry/irritable to tearful to laughing/excited. She continues to have hyperreligious thoughts, is fixated on the thermostat in her room, and intermittently anxious/agitated per daughter. She continues to have FOI, pressured speech, but is also disoriented today. She acknowledges feeling confused and is reorientable, but listed time and place as [address] [place] [address].    7/26/22:  Patient's QHS Seroquel was discontinued and she received her first dose of Risperdal 0.5 mg PO QHS overnight. She reported only getting about a half hour of sleep because she needed to stay up all night to comfort [email or web address] in the [facility]. Pt hyperfixated on biblical content. Still interruptive but speech less pressures and rapid. She is oriented to person, place, date, and situation. She complained of a headache this morning and planned to drink more water today as she feels dehydrated. She continues to be help-seeking and wants to be in the hospital until she starts  "to feel like herself again.    7/27/22:  Per chart, pt was awake and intermittently agitated overnight. She frequently got up out of bed with and without assistance (despite fall precautions and instructions to wait for assistance). An incident was recorded in the chart around 3 am in which the pt "fell" to the ground while being assisted from the bathroom to her bed. She was being assisted by nursing staff but stated "I'm going down" and required multiple staff members to assist her in walking. Pt denied SOB, dizziness, LOC, vision changes, all other symptoms at that time. Staff described this incident as seemingly bizarre delusional behavior, but pt believed it to be a genuine fall, describing her legs feeling weak at that time.     Per nursing, pt was attempting to enter other pts' rooms and sit on staff's lap in the hallway overnight. She also reportedly became physically threatening with staff who tried to help position her this morning. On evaluation this morning, pt continues to be irritable, labile, and interruptive, but her speech is less pressured and her thoughts are more organized. She continued to express hyperreligious thought content, but seemed more oriented to reality, questioning whether her thoughts/"epiphanies" were "real or an allegory". She feels her "revelations" are coming less frequently but she still feels intermittently confused and is frustrated with her lack of sleep. Pt felt she got about 30 mins of sleep overnight; nursing documented 1.5 hrs of sleep (slight improvement from previous nights). She was oriented to person, place, time. Believed her hospital stay has been 2 days long, despite 3rd day here.          Family History    None       Tobacco Use    Smoking status: Current Every Day Smoker     Types: Cigarettes    Smokeless tobacco: Never Used    Tobacco comment: smoked for 30-35 years per daughter quit many years ago   Substance and Sexual Activity    Alcohol use: Yes     " "Comment: socially less than one a month    Drug use: No    Sexual activity: Not Currently     Psychotherapeutics (From admission, onward)                Start     Stop Route Frequency Ordered    07/26/22 2100  risperiDONE tablet 1 mg         -- Oral Nightly 07/26/22 1213    07/25/22 1641  risperiDONE tablet 0.5 mg         -- Oral Every 8 hours PRN 07/25/22 1542               Objective:     Vital Signs (Most Recent):  Temp: 97.7 °F (36.5 °C) (07/27/22 1101)  Pulse: 86 (07/27/22 1101)  Resp: 18 (07/27/22 1101)  BP: (!) 118/56 (07/27/22 1101)  SpO2: (!) 92 % (07/27/22 1101)   Vital Signs (24h Range):  Temp:  [96.4 °F (35.8 °C)-97.9 °F (36.6 °C)] 97.7 °F (36.5 °C)  Pulse:  [79-94] 86  Resp:  [16-20] 18  SpO2:  [92 %-94 %] 92 %  BP: (101-142)/(51-66) 118/56     Height: 5' 3" (160 cm)  Weight: 80.3 kg (177 lb)  Body mass index is 31.35 kg/m².      Intake/Output Summary (Last 24 hours) at 7/27/2022 1256  Last data filed at 7/27/2022 0324  Gross per 24 hour   Intake 600 ml   Output --   Net 600 ml       MSE:  Orientation: oriented to person, place, time, +/- situation  Speech: somewhat less rapid/pressured, still interruptive  Language: fluent  Mood: irritable  Affect: elevated, labile  Thought Process: FOI, tangential  Associations: loose  Thought Content: No SI/HI/AVH. +delusions, grandiose, hyperreligious  Memory: Recent intact, Remote intact  Attention and Concentration: Distractible  Fund of Knowledge: Appropriate for education level  Insight: Good  Judgment: Limited    Significant Labs: Last 24 Hours:   Recent Lab Results       None            Significant Imaging: None       Scheduled Medications:   enoxaparin  40 mg Subcutaneous Daily    famotidine  20 mg Oral Daily    levothyroxine  50 mcg Oral Before breakfast    losartan  100 mg Oral Daily    melatonin  9 mg Oral Nightly    miconazole   Topical (Top) BID    risperiDONE  1 mg Oral QHS       PRN Medications:  acetaminophen, albuterol-ipratropium, dextrose " 10%, dextrose 10%, glucagon (human recombinant), glucose, glucose, naloxone, risperiDONE, sodium chloride 0.9%    Review of patient's allergies indicates:   Allergen Reactions    Penicillins Hives and Shortness Of Breath       Assessment/Plan:     * Acute psychosis  ASSESSMENT     Carmina Arcos is a 89 y.o. female with a past psychiatric history of SIPD, who presented to the [facility] due to manic behavior. Her symptoms of insomnia, hyperactivity, impulsivity, and delusions are likely 2/2 recent course of steroids. However, these symptoms are endangering her and have been difficult for her family to manage, especially due to the delay in seeing an outpatient provider. Pt would benefit from hospital admission to psychiatrically stabilize, reinstate sleep-wake cycle, rule out medical etiologies of current symptoms, and stay safe while substances continue to wash out.    IMPRESSION  Substance-induced psychotic disorder  R/o bipolar disorder    RECOMMENDATION(S)      1. Scheduled Medication(s):  - Continue Risperdal 1 mg PO QHS for acute sujata  - Can give Zyprexa 2.5 mg IM if refusing PO Risperdal  - Agree with starting Melatonin 9 mg PO QHS  - Primary team discussed starting Trazodone - we feel that the risks of orthostatic hypotension outweigh the potential benefit, which is likely minimal in this acutely manic patient    2. PRN Medication(s):  - Continue Risperdal 0.5 mg PO q8hrs PRN non-redirectable agitation; OR Zyprexa 2.5 mg IM q8hrs PRN if refusing PO  - Recommend changing PRN Risperdal to M-tab formulation    3.  Monitor:  - Please obtain daily EKG to monitor QTc    4. Legal Status/Precaution(s):  - Patient does not meet criteria for PEC at this time. Patient does appear gravely disabled due to a psychiatric illness but is help-seeking and not contesting her admission. She may not have full capacity to refuse meds at this time given her altered mentation.  - Recommend fall precautions and strict bed rest with  alarm on, low bed height, to avoid falls due to potential orthostatic hypotension secondary to Risperdal in this elderly patient.      This patient was seen and discussed with Dr. Jefferson. Thank you for the consult - Psychiatry will continue to follow. Please reach out with any questions/concerns.         Need for Continued Hospitalization:  No need for inpatient psychiatric hospitalization. Continue medical care as per the primary team.    Anticipated Disposition:  Home or Self Care vs SNF    Total time:  35 with greater than 50% of this time spent in counseling and/or coordination of care.       Alba Tyson MD   Psychiatry  [facility] S[facility]

## 2022-07-27 NOTE — NURSING
Pt is now awake, assisted her to the bathroom, then to the couch.  I ask the patient again what caused her legs to almost give way.  Her response was close the door.  I will tell you but don't tell anyone.  If you interrupt me you will kill the world. I was between heaven and hell and I didn't know if I was going to hell, I fell.  Everybody is saying I must go to sleep, the nurse, my daughter is saying I must go to sleep. My doctor is saying I must stay awake.   Due to the patient getting loud and agitated I redirected the conversation.  After I left the room, patient came out in to the carr, sat down in DANA Kelly;s lap. I redirected the patient back to her room and remained with her until she calmed down.

## 2022-07-27 NOTE — PHARMACY MED REC
"  Admission Medication History     The home medication history was taken by Jada Corona.    You may go to "Admission" then "Reconcile Home Medications" tabs to review and/or act upon these items.      The home medication list has been updated by the Pharmacy department.    Please read ALL comments highlighted in yellow.    Please address this information as you see fit.     Feel free to contact us if you have any questions or require assistance.       Medications listed below were obtained from: Patient/Pura - daughter    PTA Medications   Medication Sig    albuterol (PROVENTIL/VENTOLIN HFA) 90 mcg/actuation inhaler   Inhale 2 puffs into the lungs 2 (two) times daily as needed for Wheezing or Shortness of Breath.    BEVESPI AEROSPHERE 9-4.8 mcg HFAA   Inhale 2 puffs into the lungs 2 (two) times daily.    calcium carbonate/vitamin D3 (VITAMIN D-3 ORAL)   Take 1 capsule by mouth once daily.    diclofenac sodium (VOLTAREN) 1 % Gel   Apply 2 g topically daily as needed (Pain).    famotidine (PEPCID) 10 MG tablet   Take 10 mg by mouth 2 (two) times daily.    levothyroxine (SYNTHROID) 50 MCG tablet   Take 50 mcg by mouth before breakfast.    losartan (COZAAR) 100 MG tablet   Take 100 mg by mouth once daily.    magnesium oxide (MAG-OX) 400 mg (241.3 mg   magnesium) tablet   Take 400 mg by mouth once daily.    POTASSIUM ORAL   Take 1 tablet by mouth once daily.    UNABLE TO FIND SOLANPAS PATCH - APPLY 1 PATCH TO AFFECTED AREA DAILY AS NEEDED FOR PAIN.       Potential issues to be addressed PRIOR TO DISCHARGE  Patient requests education on calcium supplementation    Jaad Corona, Alicja  EXT [id number]              .          "

## 2022-07-27 NOTE — NURSING
Patient was awake most of this shift. She is now in the bed, sleeping, eyes closed, respirations regular, even. No noted distress.  Upper side rails up x 2, bed locked, low position, bed alarm on. Call light in reach

## 2022-07-27 NOTE — ASSESSMENT & PLAN NOTE
ASSESSMENT     Carmina Arcos is a 89 y.o. female with a past psychiatric history of SIPD, who presented to the [facility] due to manic behavior. Her symptoms of insomnia, hyperactivity, impulsivity, and delusions are likely 2/2 recent course of steroids. However, these symptoms are endangering her and have been difficult for her family to manage, especially due to the delay in seeing an outpatient provider. Pt would benefit from hospital admission to psychiatrically stabilize, reinstate sleep-wake cycle, rule out medical etiologies of current symptoms, and stay safe while substances continue to wash out.    IMPRESSION  Substance-induced psychotic disorder  R/o bipolar disorder    RECOMMENDATION(S)      1. Scheduled Medication(s):  - Continue Risperdal 1 mg PO QHS for acute sujata  - Can give Zyprexa 2.5 mg IM if refusing PO Risperdal  - Agree with starting Melatonin 9 mg PO QHS  - Primary team discussed starting Trazodone - we feel that the risks of orthostatic hypotension outweigh the potential benefit, which is likely minimal in this acutely manic patient    2. PRN Medication(s):  - Continue Risperdal 0.5 mg PO q8hrs PRN non-redirectable agitation; OR Zyprexa 2.5 mg IM q8hrs PRN if refusing PO  - Recommend changing PRN Risperdal to M-tab formulation    3.  Monitor:  - Please obtain daily EKG to monitor QTc    4. Legal Status/Precaution(s):  - Patient does not meet criteria for PEC at this time. Patient does appear gravely disabled due to a psychiatric illness but is help-seeking and not contesting her admission. She may not have full capacity to refuse meds at this time given her altered mentation.  - Recommend fall precautions and strict bed rest with alarm on, low bed height, to avoid falls due to potential orthostatic hypotension secondary to Risperdal in this elderly patient.      This patient was seen and discussed with Dr. Jefferson. Thank you for the consult - Psychiatry will continue to follow. Please reach out  with any questions/concerns.

## 2022-07-27 NOTE — SUBJECTIVE & OBJECTIVE
"    Family History    None       Tobacco Use    Smoking status: Current Every Day Smoker     Types: Cigarettes    Smokeless tobacco: Never Used    Tobacco comment: smoked for 30-35 years per daughter quit many years ago   Substance and Sexual Activity    Alcohol use: Yes     Comment: socially less than one a month    Drug use: No    Sexual activity: Not Currently     Psychotherapeutics (From admission, onward)                Start     Stop Route Frequency Ordered    07/26/22 2100  risperiDONE tablet 1 mg         -- Oral Nightly 07/26/22 1213    07/25/22 1641  risperiDONE tablet 0.5 mg         -- Oral Every 8 hours PRN 07/25/22 1542               Objective:     Vital Signs (Most Recent):  Temp: 97.7 °F (36.5 °C) (07/27/22 1101)  Pulse: 86 (07/27/22 1101)  Resp: 18 (07/27/22 1101)  BP: (!) 118/56 (07/27/22 1101)  SpO2: (!) 92 % (07/27/22 1101)   Vital Signs (24h Range):  Temp:  [96.4 °F (35.8 °C)-97.9 °F (36.6 °C)] 97.7 °F (36.5 °C)  Pulse:  [79-94] 86  Resp:  [16-20] 18  SpO2:  [92 %-94 %] 92 %  BP: (101-142)/(51-66) 118/56     Height: 5' 3" (160 cm)  Weight: 80.3 kg (177 lb)  Body mass index is 31.35 kg/m².      Intake/Output Summary (Last 24 hours) at 7/27/2022 1256  Last data filed at 7/27/2022 0324  Gross per 24 hour   Intake 600 ml   Output --   Net 600 ml       MSE:  Orientation: oriented to person, place, time, +/- situation  Speech: somewhat less rapid/pressured, still interruptive  Language: fluent  Mood: irritable  Affect: elevated, labile  Thought Process: FOI, tangential  Associations: loose  Thought Content: No SI/HI/AVH. +delusions, grandiose, hyperreligious  Memory: Recent intact, Remote intact  Attention and Concentration: Distractible  Fund of Knowledge: Appropriate for education level  Insight: Good  Judgment: Limited    Significant Labs: Last 24 Hours:   Recent Lab Results       None            Significant Imaging: None  "

## 2022-07-27 NOTE — SUBJECTIVE & OBJECTIVE
Interval History: VSS. However, overnight, pt was quite delirious requiring multiple re-directions. Did not sleep well overnight. Complained of legs swelling this morning, advised to obtain compression stocking. Pending psy recs     Review of Systems   Constitutional:  Negative for chills and fever.   HENT:  Negative for rhinorrhea, sinus pain and sore throat.    Respiratory:  Negative for cough and chest tightness.    Cardiovascular:  Negative for chest pain.   Gastrointestinal:  Negative for abdominal distention, abdominal pain, constipation and diarrhea.   Endocrine: Negative for cold intolerance.   Genitourinary:  Negative for difficulty urinating.   Musculoskeletal: Negative.    Skin: Negative.    Neurological:  Negative for seizures and headaches.   Psychiatric/Behavioral: Negative.     Objective:     Vital Signs (Most Recent):  Temp: 97.7 °F (36.5 °C) (07/27/22 1101)  Pulse: 86 (07/27/22 1101)  Resp: 18 (07/27/22 1101)  BP: (!) 118/56 (07/27/22 1101)  SpO2: (!) 92 % (07/27/22 1101)   Vital Signs (24h Range):  Temp:  [96.4 °F (35.8 °C)-98.5 °F (36.9 °C)] 97.7 °F (36.5 °C)  Pulse:  [79-94] 86  Resp:  [16-20] 18  SpO2:  [90 %-94 %] 92 %  BP: (101-142)/(51-66) 118/56     Weight: 80.3 kg (177 lb)  Body mass index is 31.35 kg/m².    Intake/Output Summary (Last 24 hours) at 7/27/2022 1233  Last data filed at 7/27/2022 0324  Gross per 24 hour   Intake 600 ml   Output --   Net 600 ml      Physical Exam  HENT:      Head: Normocephalic and atraumatic.      Right Ear: External ear normal.      Left Ear: External ear normal.      Nose: Nose normal.      Mouth/Throat:      Mouth: Mucous membranes are moist.      Pharynx: Oropharynx is clear.   Eyes:      General: No scleral icterus.     Extraocular Movements: Extraocular movements intact.      Conjunctiva/sclera: Conjunctivae normal.   Cardiovascular:      Rate and Rhythm: Normal rate and regular rhythm.      Heart sounds: No murmur heard.  Pulmonary:      Effort:  Pulmonary effort is normal.      Breath sounds: Normal breath sounds. No wheezing or rales.   Abdominal:      General: Abdomen is flat. Bowel sounds are normal. There is no distension.      Palpations: Abdomen is soft.      Tenderness: There is no abdominal tenderness.   Musculoskeletal:         General: No swelling. Normal range of motion.      Cervical back: Normal range of motion.   Skin:     General: Skin is warm and dry.   Neurological:      General: No focal deficit present.      Mental Status: She is alert.   Psychiatric:      Comments: Flight of ideas,delusions, pressured speech improved but still present.       Significant Labs: All pertinent labs within the past 24 hours have been reviewed.  Recent Lab Results       None            Significant Imaging: I have reviewed all pertinent imaging results/findings within the past 24 hours.

## 2022-07-27 NOTE — PROGRESS NOTES
"[facility] Medicine  Progress Note    Patient Name: Carmina Arcos  MRN: [id number]  Patient Class: OP- Observation   Admission Date: 7/24/2022  Length of Stay: 0 days  Attending Physician: Susie Wynne MD  Primary Care Provider: Dick Graham MD        Subjective:     Principal Problem:Acute psychosis        HPI:  History provided mostly by daughter Nimco. Ms Grewal is a 89 yo that  according to daughter after having a 3-4 weeks ago COPD exacerbation, was started on Prednisone by her son who is a nurse. States  that after a few days she started noticing behavioral changes described as flight of ideas, insomnia, rapid speech, alternating irritability with excitability, grandiose delusions auditory hallucinations, fixation with allegories, numerology and Anglican.  Daughter also affirms, reckless spending. She was taken to [facility] but there was no psych service available. They decided to come to [facility] for psychiatric evaluation.  States previous episode precipitated by "diet pills " in 1999 and had to be [facility]. There she was started on risperidone, its unclear for how long she was on it. Daughter states that the patient also has a history of depression with ne suicide attempt.    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 She currently was living on her own and managed rental property, her family lives close to her home and visit regularly.    PMH: Breast Cancer? Colon Cancer. HTN treated with losartan hydrochlorothiazide and amlodipine, Hypothyroidism treated with levothyroxine, COPD treated with with formoterol-glycopyrrolate , albuterol PRN and Sildenafil given by his son.    Surgical hx positive for bilateral knee replacement, tonsillectomy, hysterectomy, right lumpectomy and colon surgery .    Allergies: Penicillin      Overview/Hospital Course:  Admitted to hospital medicine for " concerns of drug induced delirium vs sujata. Psychiatry consulted and recommended Risperdal.       Interval History: VSS. However, overnight, pt was quite delirious requiring multiple re-directions. Did not sleep well overnight. Complained of legs swelling this morning, advised to obtain compression stocking. Pending psy recs     Review of Systems   Constitutional:  Negative for chills and fever.   HENT:  Negative for rhinorrhea, sinus pain and sore throat.    Respiratory:  Negative for cough and chest tightness.    Cardiovascular:  Negative for chest pain.   Gastrointestinal:  Negative for abdominal distention, abdominal pain, constipation and diarrhea.   Endocrine: Negative for cold intolerance.   Genitourinary:  Negative for difficulty urinating.   Musculoskeletal: Negative.    Skin: Negative.    Neurological:  Negative for seizures and headaches.   Psychiatric/Behavioral: Negative.     Objective:     Vital Signs (Most Recent):  Temp: 97.7 °F (36.5 °C) (07/27/22 1101)  Pulse: 86 (07/27/22 1101)  Resp: 18 (07/27/22 1101)  BP: (!) 118/56 (07/27/22 1101)  SpO2: (!) 92 % (07/27/22 1101)   Vital Signs (24h Range):  Temp:  [96.4 °F (35.8 °C)-98.5 °F (36.9 °C)] 97.7 °F (36.5 °C)  Pulse:  [79-94] 86  Resp:  [16-20] 18  SpO2:  [90 %-94 %] 92 %  BP: (101-142)/(51-66) 118/56     Weight: 80.3 kg (177 lb)  Body mass index is 31.35 kg/m².    Intake/Output Summary (Last 24 hours) at 7/27/2022 1233  Last data filed at 7/27/2022 0324  Gross per 24 hour   Intake 600 ml   Output --   Net 600 ml      Physical Exam  HENT:      Head: Normocephalic and atraumatic.      Right Ear: External ear normal.      Left Ear: External ear normal.      Nose: Nose normal.      Mouth/Throat:      Mouth: Mucous membranes are moist.      Pharynx: Oropharynx is clear.   Eyes:      General: No scleral icterus.     Extraocular Movements: Extraocular movements intact.      Conjunctiva/sclera: Conjunctivae normal.   Cardiovascular:      Rate and Rhythm:  Normal rate and regular rhythm.      Heart sounds: No murmur heard.  Pulmonary:      Effort: Pulmonary effort is normal.      Breath sounds: Normal breath sounds. No wheezing or rales.   Abdominal:      General: Abdomen is flat. Bowel sounds are normal. There is no distension.      Palpations: Abdomen is soft.      Tenderness: There is no abdominal tenderness.   Musculoskeletal:         General: No swelling. Normal range of motion.      Cervical back: Normal range of motion.   Skin:     General: Skin is warm and dry.   Neurological:      General: No focal deficit present.      Mental Status: She is alert.   Psychiatric:      Comments: Flight of ideas,delusions, pressured speech improved but still present.       Significant Labs: All pertinent labs within the past 24 hours have been reviewed.  Recent Lab Results       None            Significant Imaging: I have reviewed all pertinent imaging results/findings within the past 24 hours.      Assessment/Plan:      * Acute psychosis  Ms. Arcos is a 89 yo that  according to daughter after having a 3-4 weeks ago COPD exacerbation, was started on Prednisone by her son who is a nurse. States  that after a few days she started noticing behavioral changes described as flight of ideas, insomnia, rapid speech, alternating irritability with excitability, grandiose delusions auditory hallucinations, fixation with allegories, numerology and Baptist.     Differential includes but not limited to drug induced delirium like from recent steroids use vs manic episode from mood disorder. Unlikely to be 2/2 to infection given normal WBC and lacks of infectious symptoms. UA is also negative. UDS is also negative. TSH is normal, low likely moran for thyroid condition     - Psychiatry recommends changing seroquel to risperidal at night. 7/25  - Risperidal dose increased form .5 to 1mg  - Continue to follow Psych recs   - Continue to monitor mood and mental status   - Delirium precautions    - Holding further steroids       Hypertension  Home regimen: amlodipine 2.5 mg QD, HCTZ 25 mg QD, Losartan 100 mg QD     Plan: Blood pressure dropped overnight, home meds held except for losartan    COPD, severity to be determined  - Not in exacerbation at this time   - Duo Neb PRN   -Avoid Steroid nebulizer.    Mood disorder due to a general medical condition  See drug induced delirium     Sensorineural hearing loss (SNHL) of both ears        Hx of breast cancer  S/p right lumpectomy and radiation       VTE Risk Mitigation (From admission, onward)         Ordered     enoxaparin injection 40 mg  Daily         07/24/22 1332     IP VTE HIGH RISK PATIENT  Once         07/24/22 1332     Place sequential compression device  Until discontinued         07/24/22 1332                Discharge Planning   ANA: 7/29/2022     Code Status: Full Code   Is the patient medically ready for discharge?: No    Reason for patient still in hospital (select all that apply): Patient trending condition, Treatment and Consult recommendations  Discharge Plan A: Home with family                  Enriqueta Erazo DO  Department of Hospital Medicine   [facility] - Med Surg

## 2022-07-27 NOTE — ASSESSMENT & PLAN NOTE
Ms. Arcos is a 89 yo that  according to daughter after having a 3-4 weeks ago COPD exacerbation, was started on Prednisone by her son who is a nurse. States  that after a few days she started noticing behavioral changes described as flight of ideas, insomnia, rapid speech, alternating irritability with excitability, grandiose delusions auditory hallucinations, fixation with allegories, numerology and Muslim.     Differential includes but not limited to drug induced delirium like from recent steroids use vs manic episode from mood disorder. Unlikely to be 2/2 to infection given normal WBC and lacks of infectious symptoms. UA is also negative. UDS is also negative. TSH is normal, low likely moran for thyroid condition     - Psychiatry recommends changing seroquel to risperidal at night. 7/25  - Risperidal dose increased form .5 to 1mg  - Continue to follow Psych recs   - Continue to monitor mood and mental status   - Delirium precautions   - Holding further steroids

## 2022-07-27 NOTE — NURSING
"Approximately 3:05 am I was assisting patient out of the bathroom after voiding and washing her hands, when the patient verbalized she was "going down"  I felt the pt's legs giving way, supported her under her arms and called for help. Staff responded, and we walked the pt to the bed. Pt was alert, oriented and verbal during the entire time. No diaphoresis noted.  Pt denied having any dizziness, pain, sob.  Vital signs obtained  Bp 142/61, pulse 79, respirations 20, room air sat 93%.   Patient did not remain in the bed as per instructions.  She got up without assistance and sat on the couch.  Patient has slept apx 1.5 hours thus far this shift.  She has been very talkative, elated, attention-seeking behavior,  going from the bed to the bathroom, to the couch with assist/supervision  "

## 2022-07-27 NOTE — ASSESSMENT & PLAN NOTE
Home regimen: amlodipine 2.5 mg QD, HCTZ 25 mg QD, Losartan 100 mg QD     Plan: Blood pressure dropped overnight, home meds held except for losartan

## 2022-07-27 NOTE — HOSPITAL COURSE
Ms. Arcos was admitted on 7/24 for acute manic symptoms. Psychiatry consulted. She was started on a regimen of Seroquel QHS but then switched to Risperidone and depakote per psych recommendations. On 7/31, she developed increasing dyspnea and ABGs indicated a respiratory acidosis with CO2 retention. Azithromycin was prescribed for anti-inflammation in the setting of COPD.  On 8/8 patient with back to baseline, mentation according to daughter.Pressure ulcers identified in between buttocks.Patient was on and off of oxygen, slowly weaning currently at 1L nc saturating 88-92.  Patient discharge with home health. Seroquel and depakote were discontinued , will continue on risperidal and psychiatry will follow as outpatient. Follow up with pulmonology outpatient.

## 2022-07-28 LAB
ALBUMIN SERPL BCP-MCNC: 3 G/DL (ref 3.5–5.2)
ALP SERPL-CCNC: 78 U/L (ref 55–135)
ALT SERPL W/O P-5'-P-CCNC: 15 U/L (ref 10–44)
ANION GAP SERPL CALC-SCNC: 8 MMOL/L (ref 8–16)
AST SERPL-CCNC: 14 U/L (ref 10–40)
BASOPHILS # BLD AUTO: 0.05 K/UL (ref 0–0.2)
BASOPHILS NFR BLD: 1 % (ref 0–1.9)
BILIRUB SERPL-MCNC: 0.4 MG/DL (ref 0.1–1)
BUN SERPL-MCNC: 23 MG/DL (ref 10–30)
CALCIUM SERPL-MCNC: 9.1 MG/DL (ref 8.7–10.5)
CHLORIDE SERPL-SCNC: 105 MMOL/L (ref 95–110)
CO2 SERPL-SCNC: 25 MMOL/L (ref 23–29)
CREAT SERPL-MCNC: 0.8 MG/DL (ref 0.5–1.4)
DIFFERENTIAL METHOD: ABNORMAL
EOSINOPHIL # BLD AUTO: 0.3 K/UL (ref 0–0.5)
EOSINOPHIL NFR BLD: 6 % (ref 0–8)
ERYTHROCYTE [DISTWIDTH] IN BLOOD BY AUTOMATED COUNT: 13.2 % (ref 11.5–14.5)
EST. GFR  (AFRICAN AMERICAN): >60 ML/MIN/1.73 M^2
EST. GFR  (NON AFRICAN AMERICAN): >60 ML/MIN/1.73 M^2
GLUCOSE SERPL-MCNC: 154 MG/DL (ref 70–110)
HCT VFR BLD AUTO: 36.6 % (ref 37–48.5)
HGB BLD-MCNC: 11.6 G/DL (ref 12–16)
IMM GRANULOCYTES # BLD AUTO: 0.02 K/UL (ref 0–0.04)
IMM GRANULOCYTES NFR BLD AUTO: 0.4 % (ref 0–0.5)
LYMPHOCYTES # BLD AUTO: 0.7 K/UL (ref 1–4.8)
LYMPHOCYTES NFR BLD: 14.7 % (ref 18–48)
MAGNESIUM SERPL-MCNC: 2.1 MG/DL (ref 1.6–2.6)
MCH RBC QN AUTO: 28.3 PG (ref 27–31)
MCHC RBC AUTO-ENTMCNC: 31.7 G/DL (ref 32–36)
MCV RBC AUTO: 89 FL (ref 82–98)
MONOCYTES # BLD AUTO: 0.5 K/UL (ref 0.3–1)
MONOCYTES NFR BLD: 10.4 % (ref 4–15)
NEUTROPHILS # BLD AUTO: 3.3 K/UL (ref 1.8–7.7)
NEUTROPHILS NFR BLD: 67.5 % (ref 38–73)
NRBC BLD-RTO: 0 /100 WBC
PHOSPHATE SERPL-MCNC: 3.9 MG/DL (ref 2.7–4.5)
PLATELET # BLD AUTO: 170 K/UL (ref 150–450)
PMV BLD AUTO: 10.4 FL (ref 9.2–12.9)
POTASSIUM SERPL-SCNC: 3.8 MMOL/L (ref 3.5–5.1)
PROT SERPL-MCNC: 6 G/DL (ref 6–8.4)
RBC # BLD AUTO: 4.1 M/UL (ref 4–5.4)
SODIUM SERPL-SCNC: 138 MMOL/L (ref 136–145)
WBC # BLD AUTO: 4.82 K/UL (ref 3.9–12.7)

## 2022-07-28 PROCEDURE — 80053 COMPREHEN METABOLIC PANEL: CPT | Performed by: STUDENT IN AN ORGANIZED HEALTH CARE EDUCATION/TRAINING PROGRAM

## 2022-07-28 PROCEDURE — 25000003 PHARM REV CODE 250: Performed by: HOSPITALIST

## 2022-07-28 PROCEDURE — G0378 HOSPITAL OBSERVATION PER HR: HCPCS

## 2022-07-28 PROCEDURE — 25000003 PHARM REV CODE 250

## 2022-07-28 PROCEDURE — S0166 INJ OLANZAPINE 2.5MG: HCPCS | Performed by: STUDENT IN AN ORGANIZED HEALTH CARE EDUCATION/TRAINING PROGRAM

## 2022-07-28 PROCEDURE — 83735 ASSAY OF MAGNESIUM: CPT | Performed by: STUDENT IN AN ORGANIZED HEALTH CARE EDUCATION/TRAINING PROGRAM

## 2022-07-28 PROCEDURE — 84100 ASSAY OF PHOSPHORUS: CPT | Performed by: STUDENT IN AN ORGANIZED HEALTH CARE EDUCATION/TRAINING PROGRAM

## 2022-07-28 PROCEDURE — 99226 PR SUBSEQUENT OBSERVATION CARE,LEVEL III: ICD-10-PCS | Mod: GC,,, | Performed by: STUDENT IN AN ORGANIZED HEALTH CARE EDUCATION/TRAINING PROGRAM

## 2022-07-28 PROCEDURE — 97530 THERAPEUTIC ACTIVITIES: CPT

## 2022-07-28 PROCEDURE — 36415 COLL VENOUS BLD VENIPUNCTURE: CPT | Performed by: STUDENT IN AN ORGANIZED HEALTH CARE EDUCATION/TRAINING PROGRAM

## 2022-07-28 PROCEDURE — 63600175 PHARM REV CODE 636 W HCPCS: Performed by: STUDENT IN AN ORGANIZED HEALTH CARE EDUCATION/TRAINING PROGRAM

## 2022-07-28 PROCEDURE — 25000003 PHARM REV CODE 250: Performed by: STUDENT IN AN ORGANIZED HEALTH CARE EDUCATION/TRAINING PROGRAM

## 2022-07-28 PROCEDURE — 96372 THER/PROPH/DIAG INJ SC/IM: CPT | Performed by: STUDENT IN AN ORGANIZED HEALTH CARE EDUCATION/TRAINING PROGRAM

## 2022-07-28 PROCEDURE — 99226 PR SUBSEQUENT OBSERVATION CARE,LEVEL III: CPT | Mod: GC,,, | Performed by: STUDENT IN AN ORGANIZED HEALTH CARE EDUCATION/TRAINING PROGRAM

## 2022-07-28 PROCEDURE — 85025 COMPLETE CBC W/AUTO DIFF WBC: CPT | Performed by: STUDENT IN AN ORGANIZED HEALTH CARE EDUCATION/TRAINING PROGRAM

## 2022-07-28 PROCEDURE — 97110 THERAPEUTIC EXERCISES: CPT

## 2022-07-28 RX ORDER — VALPROIC ACID 250 MG/5ML
250 SOLUTION ORAL NIGHTLY
Status: DISCONTINUED | OUTPATIENT
Start: 2022-07-28 | End: 2022-07-28

## 2022-07-28 RX ORDER — DIVALPROEX SODIUM 250 MG/1
250 TABLET, DELAYED RELEASE ORAL EVERY 8 HOURS
Status: DISCONTINUED | OUTPATIENT
Start: 2022-07-28 | End: 2022-07-29

## 2022-07-28 RX ORDER — OLANZAPINE 10 MG/2ML
2.5 INJECTION, POWDER, FOR SOLUTION INTRAMUSCULAR EVERY 8 HOURS PRN
Status: DISCONTINUED | OUTPATIENT
Start: 2022-07-28 | End: 2022-07-29

## 2022-07-28 RX ADMIN — LEVOTHYROXINE SODIUM 50 MCG: 50 TABLET ORAL at 05:07

## 2022-07-28 RX ADMIN — RISPERIDONE 1 MG: 1 TABLET ORAL at 09:07

## 2022-07-28 RX ADMIN — LOSARTAN POTASSIUM 100 MG: 50 TABLET, FILM COATED ORAL at 08:07

## 2022-07-28 RX ADMIN — OLANZAPINE 2.5 MG: 10 INJECTION, POWDER, LYOPHILIZED, FOR SOLUTION INTRAMUSCULAR at 01:07

## 2022-07-28 RX ADMIN — ENOXAPARIN SODIUM 40 MG: 40 INJECTION SUBCUTANEOUS at 04:07

## 2022-07-28 RX ADMIN — MICONAZOLE NITRATE: 20 CREAM TOPICAL at 09:07

## 2022-07-28 RX ADMIN — FAMOTIDINE 20 MG: 20 TABLET ORAL at 08:07

## 2022-07-28 RX ADMIN — ACETAMINOPHEN 650 MG: 325 TABLET ORAL at 09:07

## 2022-07-28 RX ADMIN — DIVALPROEX SODIUM 250 MG: 250 TABLET, DELAYED RELEASE ORAL at 09:07

## 2022-07-28 RX ADMIN — RISPERIDONE 0.5 MG: 0.5 TABLET, ORALLY DISINTEGRATING ORAL at 02:07

## 2022-07-28 NOTE — ASSESSMENT & PLAN NOTE
Ms. Arcos is a 89 yo that  according to daughter after having a 3-4 weeks ago COPD exacerbation, was started on Prednisone by her son who is a nurse. States  that after a few days she started noticing behavioral changes described as flight of ideas, insomnia, rapid speech, alternating irritability with excitability, grandiose delusions auditory hallucinations, fixation with allegories, numerology and Scientology.     Differential includes but not limited to drug induced delirium like from recent steroids use vs manic episode from mood disorder. Unlikely to be 2/2 to infection given normal WBC and lacks of infectious symptoms. UA is also negative. UDS is also negative. TSH is normal, low likely moran for thyroid condition     - Psychiatry recommends changing seroquel to risperidal at night. 7/25  - Risperidal dose increased form .5 to 1mg  - Continue to follow Psych recs   - Continue to monitor mood and mental status   - Delirium precautions   - Holding further steroids

## 2022-07-28 NOTE — PROGRESS NOTES
"[facility] Medicine  Progress Note    Patient Name: Carmina Arcos  MRN: [id number]  Patient Class: OP- Observation   Admission Date: 7/24/2022  Length of Stay: 0 days  Attending Physician: John Nguyen MD  Primary Care Provider: Dick Graham MD        Subjective:     Principal Problem:Acute psychosis        HPI:  History provided mostly by daughter Nimco. Ms Grewal is a 89 yo that  according to daughter after having a 3-4 weeks ago COPD exacerbation, was started on Prednisone by her son who is a nurse. States  that after a few days she started noticing behavioral changes described as flight of ideas, insomnia, rapid speech, alternating irritability with excitability, grandiose delusions auditory hallucinations, fixation with allegories, numerology and Evangelical.  Daughter also affirms, reckless spending. She was taken to [facility] but there was no psych service available. They decided to come to [facility] for psychiatric evaluation.  States previous episode precipitated by "diet pills " in 1999 and had to be [facility] for a week. There she was started on risperidone, its unclear for how long she was on it. Daughter states that the patient also has a history of depression with ne suicide attempt.    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 She currently was living on her own and managed rental property, her family lives close to her home and visit regularly.    PMH: Breast Cancer? Colon Cancer. HTN treated with losartan hydrochlorothiazide and amlodipine, Hypothyroidism treated with levothyroxine, COPD treated with with formoterol-glycopyrrolate , albuterol PRN and Sildenafil given by his son.    Surgical hx positive for bilateral knee replacement, tonsillectomy, hysterectomy, right lumpectomy and colon surgery .    Allergies: Penicillin      Overview/Hospital Course:  Admitted to hospital medicine for " concerns of drug induced delirium vs sujata. Psychiatry consulted and recommended Risperdal.       Interval History: Patient states that she felt weak overnight but no other acute events. Nurse reported that she needed help to take her to her bed when she reported weakness.inconsistent      Review of Systems   Constitutional:  Negative for chills and fever.   HENT:  Negative for rhinorrhea, sinus pain and sore throat.    Respiratory:  Negative for cough and chest tightness.    Cardiovascular:  Negative for chest pain.   Gastrointestinal:  Negative for abdominal distention, abdominal pain, constipation and diarrhea.   Endocrine: Negative for cold intolerance.   Genitourinary:  Negative for difficulty urinating.   Musculoskeletal: Negative.    Skin: Negative.    Neurological:  Negative for seizures and headaches.   Psychiatric/Behavioral: Negative.  The patient is not nervous/anxious.    Objective:     Vital Signs (Most Recent):  Temp: 98.1 °F (36.7 °C) (07/28/22 1059)  Pulse: 87 (07/28/22 1059)  Resp: 18 (07/28/22 1059)  BP: (!) 119/57 (07/28/22 1059)  SpO2: (!) 94 % (07/28/22 1059)   Vital Signs (24h Range):  Temp:  [97.6 °F (36.4 °C)-98.5 °F (36.9 °C)] 98.1 °F (36.7 °C)  Pulse:  [83-91] 87  Resp:  [17-18] 18  SpO2:  [89 %-97 %] 94 %  BP: (119-147)/(57-65) 119/57     Weight: 80.3 kg (177 lb)  Body mass index is 31.35 kg/m².  No intake or output data in the 24 hours ending 07/28/22 1612     Physical Exam  Constitutional:       General: She is not in acute distress.     Appearance: She is not ill-appearing.   HENT:      Head: Normocephalic and atraumatic.      Right Ear: External ear normal.      Left Ear: External ear normal.      Nose: Nose normal.      Mouth/Throat:      Mouth: Mucous membranes are moist.      Pharynx: Oropharynx is clear.   Eyes:      General: No scleral icterus.     Extraocular Movements: Extraocular movements intact.      Conjunctiva/sclera: Conjunctivae normal.   Cardiovascular:      Rate and  Rhythm: Normal rate and regular rhythm.      Heart sounds: No murmur heard.  Pulmonary:      Effort: Pulmonary effort is normal.      Breath sounds: Normal breath sounds. No wheezing or rales.   Abdominal:      General: Abdomen is flat. Bowel sounds are normal. There is no distension.      Palpations: Abdomen is soft.      Tenderness: There is no abdominal tenderness.   Musculoskeletal:         General: No swelling. Normal range of motion.      Cervical back: Normal range of motion.      Right lower leg: Edema (hemotoma present, not recent) present.   Skin:     General: Skin is warm and dry.   Neurological:      General: No focal deficit present.      Mental Status: She is alert.   Psychiatric:      Comments: Patient redirectable, no hypereligious though, less pressured speech.       Significant Labs: All pertinent labs within the past 24 hours have been reviewed.  Recent Lab Results         07/28/22  0334        Albumin 3.0       Alkaline Phosphatase 78       ALT 15       Anion Gap 8       AST 14       Baso # 0.05       Basophil % 1.0       BILIRUBIN TOTAL 0.4  Comment: For infants and newborns, interpretation of results should be based  on gestational age, weight and in agreement with clinical  observations.    Premature Infant recommended reference ranges:  Up to 24 hours.............<8.0 mg/dL  Up to 48 hours............<12.0 mg/dL  3-5 days..................<15.0 mg/dL  6-29 days.................<15.0 mg/dL         BUN 23       Calcium 9.1       Chloride 105       CO2 25       Creatinine 0.8       Differential Method Automated       eGFR if  >60.0       eGFR if non  >60.0  Comment: Calculation used to obtain the estimated glomerular filtration  rate (eGFR) is the CKD-EPI equation.          Eos # 0.3       Eosinophil % 6.0       Glucose 154       Gran # (ANC) 3.3       Gran % 67.5       Hematocrit 36.6       Hemoglobin 11.6       Immature Grans (Abs) 0.02  Comment: Mild elevation  in immature granulocytes is non specific and   can be seen in a variety of conditions including stress response,   acute inflammation, trauma and pregnancy. Correlation with other   laboratory and clinical findings is essential.         Immature Granulocytes 0.4       Lymph # 0.7       Lymph % 14.7       Magnesium 2.1       MCH 28.3       MCHC 31.7       MCV 89       Mono # 0.5       Mono % 10.4       MPV 10.4       nRBC 0       Phosphorus 3.9       Platelets 170       Potassium 3.8       PROTEIN TOTAL 6.0       RBC 4.10       RDW 13.2       Sodium 138       WBC 4.82               Significant Imaging: I have reviewed all pertinent imaging results/findings within the past 24 hours.      Assessment/Plan:      * Acute psychosis  Ms. Arcos is a 89 yo that  according to daughter after having a 3-4 weeks ago COPD exacerbation, was started on Prednisone by her son who is a nurse. States  that after a few days she started noticing behavioral changes described as flight of ideas, insomnia, rapid speech, alternating irritability with excitability, grandiose delusions auditory hallucinations, fixation with allegories, numerology and Shinto.     Differential includes but not limited to drug induced delirium like from recent steroids use vs manic episode from mood disorder. Unlikely to be 2/2 to infection given normal WBC and lacks of infectious symptoms. UA is also negative. UDS is also negative. TSH is normal, low likely moran for thyroid condition     - Psychiatry recommends changing seroquel to risperidal at night. 7/25  - Risperidal dose increased form .5 to 1mg  - Continue to follow Psych recs   - Continue to monitor mood and mental status   - Delirium precautions   - Holding further steroids       COPD, severity to be determined  - Not in exacerbation at this time   - Duo Neb PRN   -Avoid Steroid nebulizer.    Mood disorder due to a general medical condition  See drug induced delirium     Sensorineural hearing loss  (SNHL) of both ears        Hypertension  Home regimen: amlodipine 2.5 mg QD, HCTZ 25 mg QD, Losartan 100 mg QD     Plan: Blood pressure dropped overnight, home meds held except for losartan    Hx of breast cancer  S/p right lumpectomy and radiation       VTE Risk Mitigation (From admission, onward)         Ordered     enoxaparin injection 40 mg  Daily         07/24/22 1332     IP VTE HIGH RISK PATIENT  Once         07/24/22 1332     Place sequential compression device  Until discontinued         07/24/22 1332                Discharge Planning   ANA: 7/29/2022     Code Status: Full Code   Is the patient medically ready for discharge?: No    Reason for patient still in hospital (select all that apply): Patient trending condition  Discharge Plan A: Home with family                  Gadiel Grajeda MD  Department of Hospital Medicine   [facility]

## 2022-07-28 NOTE — ASSESSMENT & PLAN NOTE
ASSESSMENT     Carmina Arcos is a 89 y.o. female with a past psychiatric history of SIPD, who presented to the [facility] due to manic behavior. Her symptoms of insomnia, hyperactivity, impulsivity, and delusions are likely 2/2 recent course of steroids. However, these symptoms are endangering her and have been difficult for her family to manage, especially due to the delay in seeing an outpatient provider. Pt would benefit from hospital admission to psychiatrically stabilize, reinstate sleep-wake cycle, rule out medical etiologies of current symptoms, and stay safe while substances continue to wash out.    IMPRESSION  Substance-induced psychotic disorder  R/o bipolar disorder    RECOMMENDATION(S)      1. Scheduled Medication(s):  - Start Depakote 250 mg PO QHS to encourage nighttime sleep  - Continue Risperdal 1 mg PO QHS for acute sujata  - Can give Zyprexa 2.5 mg IM if refusing PO Risperdal  - Agree with starting Melatonin 9 mg PO QHS  - Primary team discussed starting Trazodone - we feel that the risks of orthostatic hypotension outweigh the potential benefit, which is likely minimal in this acutely manic patient    2. PRN Medication(s):  - Continue Risperdal 0.5 mg PO q8hrs PRN non-redirectable agitation  - Recommend changing PRN Risperdal to PRN Zyprexa 2.5 mg PO or IM q8hrs PRN  - Nursing seems unaware of PRN IM option per conversation this morning - would emphasize this option is available to nursing staff    3.  Monitor:  - Please obtain daily EKG to monitor QTc    4. Legal Status/Precaution(s):  - Patient does not meet criteria for PEC at this time. Patient does appear gravely disabled due to a psychiatric illness but is help-seeking and not contesting her admission. She may not have full capacity to refuse meds at this time given her altered mentation.  - Recommend fall precautions and strict bed rest with alarm on, low bed height, to avoid falls due to potential orthostatic hypotension secondary to Risperdal  in this elderly patient.      This patient was seen and discussed with Dr. Jefferson. Thank you for the consult - Psychiatry will continue to follow. Please reach out with any questions/concerns.

## 2022-07-28 NOTE — SUBJECTIVE & OBJECTIVE
Interval History: Patient states that she felt weak overnight but no other acute events. Nurse reported that she needed help to take her to her bed when she reported weakness.inconsistent      Review of Systems   Constitutional:  Negative for chills and fever.   HENT:  Negative for rhinorrhea, sinus pain and sore throat.    Respiratory:  Negative for cough and chest tightness.    Cardiovascular:  Negative for chest pain.   Gastrointestinal:  Negative for abdominal distention, abdominal pain, constipation and diarrhea.   Endocrine: Negative for cold intolerance.   Genitourinary:  Negative for difficulty urinating.   Musculoskeletal: Negative.    Skin: Negative.    Neurological:  Negative for seizures and headaches.   Psychiatric/Behavioral: Negative.  The patient is not nervous/anxious.    Objective:     Vital Signs (Most Recent):  Temp: 98.1 °F (36.7 °C) (07/28/22 1059)  Pulse: 87 (07/28/22 1059)  Resp: 18 (07/28/22 1059)  BP: (!) 119/57 (07/28/22 1059)  SpO2: (!) 94 % (07/28/22 1059)   Vital Signs (24h Range):  Temp:  [97.6 °F (36.4 °C)-98.5 °F (36.9 °C)] 98.1 °F (36.7 °C)  Pulse:  [83-91] 87  Resp:  [17-18] 18  SpO2:  [89 %-97 %] 94 %  BP: (119-147)/(57-65) 119/57     Weight: 80.3 kg (177 lb)  Body mass index is 31.35 kg/m².  No intake or output data in the 24 hours ending 07/28/22 1612     Physical Exam  Constitutional:       General: She is not in acute distress.     Appearance: She is not ill-appearing.   HENT:      Head: Normocephalic and atraumatic.      Right Ear: External ear normal.      Left Ear: External ear normal.      Nose: Nose normal.      Mouth/Throat:      Mouth: Mucous membranes are moist.      Pharynx: Oropharynx is clear.   Eyes:      General: No scleral icterus.     Extraocular Movements: Extraocular movements intact.      Conjunctiva/sclera: Conjunctivae normal.   Cardiovascular:      Rate and Rhythm: Normal rate and regular rhythm.      Heart sounds: No murmur heard.  Pulmonary:      Effort:  Pulmonary effort is normal.      Breath sounds: Normal breath sounds. No wheezing or rales.   Abdominal:      General: Abdomen is flat. Bowel sounds are normal. There is no distension.      Palpations: Abdomen is soft.      Tenderness: There is no abdominal tenderness.   Musculoskeletal:         General: No swelling. Normal range of motion.      Cervical back: Normal range of motion.      Right lower leg: Edema (hemotoma present, not recent) present.   Skin:     General: Skin is warm and dry.   Neurological:      General: No focal deficit present.      Mental Status: She is alert.   Psychiatric:      Comments: Patient redirectable, no hypereligious though, less pressured speech.       Significant Labs: All pertinent labs within the past 24 hours have been reviewed.  Recent Lab Results         07/28/22  0334        Albumin 3.0       Alkaline Phosphatase 78       ALT 15       Anion Gap 8       AST 14       Baso # 0.05       Basophil % 1.0       BILIRUBIN TOTAL 0.4  Comment: For infants and newborns, interpretation of results should be based  on gestational age, weight and in agreement with clinical  observations.    Premature Infant recommended reference ranges:  Up to 24 hours.............<8.0 mg/dL  Up to 48 hours............<12.0 mg/dL  3-5 days..................<15.0 mg/dL  6-29 days.................<15.0 mg/dL         BUN 23       Calcium 9.1       Chloride 105       CO2 25       Creatinine 0.8       Differential Method Automated       eGFR if  >60.0       eGFR if non  >60.0  Comment: Calculation used to obtain the estimated glomerular filtration  rate (eGFR) is the CKD-EPI equation.          Eos # 0.3       Eosinophil % 6.0       Glucose 154       Gran # (ANC) 3.3       Gran % 67.5       Hematocrit 36.6       Hemoglobin 11.6       Immature Grans (Abs) 0.02  Comment: Mild elevation in immature granulocytes is non specific and   can be seen in a variety of conditions including  stress response,   acute inflammation, trauma and pregnancy. Correlation with other   laboratory and clinical findings is essential.         Immature Granulocytes 0.4       Lymph # 0.7       Lymph % 14.7       Magnesium 2.1       MCH 28.3       MCHC 31.7       MCV 89       Mono # 0.5       Mono % 10.4       MPV 10.4       nRBC 0       Phosphorus 3.9       Platelets 170       Potassium 3.8       PROTEIN TOTAL 6.0       RBC 4.10       RDW 13.2       Sodium 138       WBC 4.82               Significant Imaging: I have reviewed all pertinent imaging results/findings within the past 24 hours.

## 2022-07-28 NOTE — SUBJECTIVE & OBJECTIVE
"    Family History    None       Tobacco Use    Smoking status: Current Every Day Smoker     Types: Cigarettes    Smokeless tobacco: Never Used    Tobacco comment: smoked for 30-35 years per daughter quit many years ago   Substance and Sexual Activity    Alcohol use: Yes     Comment: socially less than one a month    Drug use: No    Sexual activity: Not Currently     Psychotherapeutics (From admission, onward)                Start     Stop Route Frequency Ordered    07/27/22 1612  risperiDONE disintegrating tablet 0.5 mg         -- Oral Every 8 hours PRN 07/27/22 1512    07/27/22 1610  OLANZapine injection 2.5 mg         -- IM Every 8 hours PRN 07/27/22 1510    07/26/22 2100  risperiDONE tablet 1 mg         -- Oral Nightly 07/26/22 1213               Objective:     Vital Signs (Most Recent):  Temp: 98 °F (36.7 °C) (07/28/22 0730)  Pulse: 83 (07/28/22 0730)  Resp: 18 (07/28/22 0730)  BP: 128/60 (07/28/22 0730)  SpO2: 97 % (07/28/22 0730)   Vital Signs (24h Range):  Temp:  [97.6 °F (36.4 °C)-98.5 °F (36.9 °C)] 98 °F (36.7 °C)  Pulse:  [83-91] 83  Resp:  [17-18] 18  SpO2:  [89 %-97 %] 97 %  BP: (118-147)/(56-65) 128/60     Height: 5' 3" (160 cm)  Weight: 80.3 kg (177 lb)  Body mass index is 31.35 kg/m².    No intake or output data in the 24 hours ending 07/28/22 1051      MSE:  Orientation: oriented to person, place, time, situation  Speech: somewhat less rapid/pressured, still interruptive  Language: fluent  Mood: irritable  Affect: irritable, drowsy, labile  Thought Process: FOI, tangential  Associations: loose  Thought Content: No SI/HI/AVH. +delusions, grandiose, hyperreligious  Memory: Recent intact, Remote intact  Attention and Concentration: Distractible  Fund of Knowledge: Appropriate for education level  Insight: Fair  Judgment: Limited    Significant Labs: Last 24 Hours:   Recent Lab Results         07/28/22  0334        Albumin 3.0       Alkaline Phosphatase 78       ALT 15       Anion Gap 8       AST 14    "    Baso # 0.05       Basophil % 1.0       BILIRUBIN TOTAL 0.4  Comment: For infants and newborns, interpretation of results should be based  on gestational age, weight and in agreement with clinical  observations.    Premature Infant recommended reference ranges:  Up to 24 hours.............<8.0 mg/dL  Up to 48 hours............<12.0 mg/dL  3-5 days..................<15.0 mg/dL  6-29 days.................<15.0 mg/dL         BUN 23       Calcium 9.1       Chloride 105       CO2 25       Creatinine 0.8       Differential Method Automated       eGFR if  >60.0       eGFR if non  >60.0  Comment: Calculation used to obtain the estimated glomerular filtration  rate (eGFR) is the CKD-EPI equation.          Eos # 0.3       Eosinophil % 6.0       Glucose 154       Gran # (ANC) 3.3       Gran % 67.5       Hematocrit 36.6       Hemoglobin 11.6       Immature Grans (Abs) 0.02  Comment: Mild elevation in immature granulocytes is non specific and   can be seen in a variety of conditions including stress response,   acute inflammation, trauma and pregnancy. Correlation with other   laboratory and clinical findings is essential.         Immature Granulocytes 0.4       Lymph # 0.7       Lymph % 14.7       Magnesium 2.1       MCH 28.3       MCHC 31.7       MCV 89       Mono # 0.5       Mono % 10.4       MPV 10.4       nRBC 0       Phosphorus 3.9       Platelets 170       Potassium 3.8       PROTEIN TOTAL 6.0       RBC 4.10       RDW 13.2       Sodium 138       WBC 4.82               Significant Imaging: None

## 2022-07-28 NOTE — PLAN OF CARE
Patient very agitated. Family keeps calling saying she is constantly calling them because she cant keep still. Patient constantly yelling for staff and want to pull IV out. Constantly crying and calling for God. PRN olanzapine administered and patient went to sleep for a couple of hours and woke up yelling and very agitated. Patient refused to take anything by mouth.

## 2022-07-28 NOTE — PT/OT/SLP PROGRESS
Physical Therapy Treatment    Patient Name:  Carmina Arcos   MRN:  [id number]    Recommendations:     Discharge Recommendations:  home health PT   Discharge Equipment Recommendations: bedside commode, walker, rolling   Barriers to discharge: requires increased assistance    Assessment:     Carmina Arcos is a 89 y.o. female admitted with a medical diagnosis of Acute psychosis.  She presents with the following impairments/functional limitations:  weakness, impaired endurance, impaired self care skills, impaired functional mobility, gait instability, impaired balance, impaired cognition. Carmina Arcos would benefit from acute PT intervention to address listed functional deficits, provide patient/caregiver education, reduce fall risk, and maximize (I) and safety with functional mobility.    Pt presents with significant functional mobility deficits and is functioning below baseline. Pt participated well in PT session, but was highly distractible throughout and required near constant redirection to attend to simple tasks. Pt declined OOB mobility; session focused on education to Pt, daughter, and therex. Pt will continue to benefit from acute PT services in order to maximize safety and (I) with functional mobility.    After hospital discharge, pt would benefit from SNF to continue addressing therapy impairments.    Rehab Prognosis: Good; patient would benefit from acute skilled PT services to address these deficits and reach maximum level of function.      Plan:     During this hospitalization, patient to be seen 2 x/week to address the identified rehab impairments via gait training, therapeutic activities, therapeutic exercises, neuromuscular re-education and progress toward the following goals:    · Plan of Care Expires:  08/25/22    This plan of care has been discussed with the patient/caregiver, who was included in its development and is in agreement with the identified goals and treatment plan.     Subjective      Communicated with RN prior to session.  Patient agreeable to participate.     Chief Complaint: none noted  Patient/Family Comments/goals: to go home  Pt pain prior to session: 0/10  Pt pain following session: 0/10    Objective:     Patient found HOB elevated with peripheral IV  upon PT entry to room.    General Precautions: Standard, fall   Orthopedic Precautions:N/A   Braces:      Functional Mobility:    Bed Mobility:  · Not assessed. Pt declined all functional mobility      Therapeutic Activities/Exercises     Pt performed bed level therex:  2 x 10 SAQs  2 x 10 ankle pumps  2 x 5 glute sets  *Education provided on proper technique, eccentric control, and holding at end range.   *Education on therex and frequency/reps to Pt and daughter    Patient educated on the importance of early mobility to prevent functional decline during hospital stay    Patient was instructed to utilize staff assistance for mobility/transfers.  Patient was educated on PT POC and all questions answered within PT scope of practice.    Patient/caregiver able to verbalize understanding; will follow-up with pt/caregiver during current admit for additional questions/concerns within scope of practice.     AM-PAC 6 CLICK MOBILITY  Total Score:      Patient left HOB elevated with all lines intact, call button in reach and medical team present.        History/Goals:     PAST MEDICAL HISTORY:  Past Medical History:   Diagnosis Date    Anxiety     Breast cancer 2000    right (lumpectomy and radiation)    Broken bones     Colon cancer     COPD (chronic obstructive pulmonary disease)     Depression     depression (no meds now)    Hypertension     Manic episode without psychotic symptoms, unspecified     due to stimulant diet pilss, resolved       Past Surgical History:   Procedure Laterality Date    APPENDECTOMY      BREAST LUMPECTOMY Right     colon operation       COLON SURGERY      HYSTERECTOMY      Pt states still have ovaries      JOINT REPLACEMENT      TONSILLECTOMY         GOALS:   Multidisciplinary Problems     Physical Therapy Goals        Problem: Physical Therapy    Goal Priority Disciplines Outcome Goal Variances Interventions   Physical Therapy Goal     PT, PT/OT Ongoing, Progressing     Description: Goals to be met by: 2022     Patient will increase functional independence with mobility by performin. Supine to sit with Modified [place].  2. Sit to supine with Modified [place].  3. Sit to stand transfer with Modified [place] using Single- point Cane.  4. Bed to chair transfer with Modified [place] using Single-point Cane   5. Gait  x 200 feet with Modified [place] using Single-point Cane .                      Time Tracking:     PT Received On: 22  PT Start Time: 1145     PT Stop Time: 1209  PT Total Time (min): 24 min     Billable Minutes: Therapeutic Activity 8 and Therapeutic Exercise 16      Elia Garcia PT  2022

## 2022-07-28 NOTE — PROGRESS NOTES
"[facility]  Psychiatry  Progress Note    Patient Name: Carmina Arcos  MRN: [id number]   Code Status: Full Code  Admission Date: 7/24/2022  Hospital Length of Stay: 0 days  Expected Discharge Date: 7/29/2022  Attending Physician: John Nguyen MD  Primary Care Provider: Dick Graham MD    Current Legal Status: Uncontested    Patient information was obtained from patient, relative(s), past medical records, ER records and primary team.       Subjective:     Patient is a 89 y.o., female, presents with:    Principal Problem:Acute psychosis    Chief Complaint: sujata/psychosis    HPI:   Carmina Arcos is a 89 y.o. female with a past psychiatric history of substance-induced psychotic disorder who presented to [facility] due to Manic behavior. Psychiatry was consulted for "sujata x several days, multiple ED visits".    Per Primary Team:  89 yo W with pmhx COPD, anxiety, depression, HTN, breast CA presents with family for psych evaluation.  History is assisted by the patient and the patient's daughter.  They note that patient was having coughing flares of COPD 3 weeks ago and she was started on course of prednisone by a family member.  She was on the prednisone for almost 2 weeks.  The prednisone has since discontinued that she continues to have manic symptoms including insomnia, reckless spending behavior, typical references Septra.  She was seen in the emergency department several days ago and discharged home with close observation by family members and outpatient psychiatry follow-up.  That follow-up is in 6 days.  Family states that she will need a sooner psychiatric evaluation.  She did have history of manic episode many years ago in the setting of taking a diet pill.  She was admitted inpatient time.  The patient does report some foul-smelling odor in her urine and is concerned that she has a kidney infection.  Otherwise she is essentially without complaints.     The history is provided by the patient and a " "relative. No  was used.     Per Psychiatry:  Patient was seen in the ED with daughter at bedside; pt and daughter provided history together. Pt was alert, oriented x4, rapid pressured interruptive speech, euthymic affect. They report about a week of psychiatric symptoms including excitability/irritability, anxiety, insomnia, hyperactivity, visual hallucinations, hyperreligious and grandiose delusions. Pt has been preoccupied with numerology and believes her family is part of a biblical story and that she has been in the [facility] with Lázaro. She has also been impulsively shopping and spending large amounts of money. She has intermittent confusion, FOI, BALA on exam.    Pt has a history of COPD and started a tapered 2-wk course of PO Prednisone for a presumed exacerbation, which she got from her son (a nurse and RT). Before the course was completed, the pt's psychiatric symptoms started as described above. Family was concerned that the Prednisone may be causing her symptoms but they could not convince her to stop taking it. They presented to the ED several times since then and were initially comfortable with outpatient follow-up with pt's former psychiatrist, Dr. Ceballos, on Friday 7/29. However, they have become increasingly concerned with pt's symptoms and feel she may be a danger to herself.     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 She speaks several languages and has no known history of cognitive decline. Her psychiatric history includes one substance-induced psychotic episode in 1999. She took some stimulant diet pills, developed insomnia and visual hallucinations, and was admitted to [facility]. She was started on Risperdal and Zoloft, which she continued to see Dr. Ceballos for for some time. Eventually her symptoms resolved and she went back to her "relatively high-strung" baseline, per daughter. She has been out " "of Dr. Ceballos's care and not having psychiatric issues for the past 10+ years. Pt's daughter also reports a remote suicide attempt over 30 yrs ago, via Benadryl overdose.    Prior to the course of Prednisone, pt was at baseline with none of her current symptoms. She last took Prednisone on Wednesday 7/20 but the symptoms have persisted. Pt and family are amenable to inpt hospitalization for her current symptoms.    Medical Review of Systems:  Complete review of systems performed covering Constitutional, Eyes, ENT/Mouth, Cardiovascular, Respiratory, Gastrointestinal, Genitourinary, Musculoskeletal, Skin, Neurologic, Endocrine, Heme/Lymph, and Allergy/Immune.     Complete review of systems was negative with the exception of the following positive symptoms: "funny feeling" in pt's gums/teeth    Psychiatric Review of Systems-is patient experiencing or having changes in  sleep: yes, insomnia  appetite: no  weight: no  energy/anergy: yes, hyperactive  interest/pleasure/anhedonia: no  somatic symptoms: no  libido: no  anxiety/panic: yes  guilty/hopelessness: no  concentration: yes  S.I.B.s/risky behavior: yes, impulsive shopping  any drugs: no  alcohol: no     Allergies:  Penicillins    Past Medical/Surgical History:  Past Medical History:   Diagnosis Date    Anxiety     Breast cancer 2000    right (lumpectomy and radiation)    Broken bones     Colon cancer     COPD (chronic obstructive pulmonary disease)     Depression     depression (no meds now)    Hypertension      Past Surgical History:   Procedure Laterality Date    APPENDECTOMY      BREAST LUMPECTOMY Right     colon operation       HYSTERECTOMY      Pt states still have ovaries     TONSILLECTOMY         Past Psychiatric History:  Previous Medication Trials:  yes, Risperdal and Zoloft    Previous Psychiatric Hospitalizations: yes   Previous Suicide Attempts: yes   History of Violence: no  Outpatient Psychiatrist:  Dr. Ceballos (formerly, with plans to " re-establish)    Social History:  Marital Status:   Children: 3   Employment Status/Info: retired  Education: college graduate  Special Ed: no  Housing Status: lives alone  History of phys/sexual abuse: no  Access to gun: no    Substance Abuse History:  Recreational Drugs:  denied  Use of Alcohol: occasional, social use  Rehab History: no   Tobacco Use: no  Use of OTC: denied    Legal History:  Past Charges/Incarcerations: no   Pending charges: no     Family Psychiatric History:   denied    Psychosocial Stressors: health  Functioning Relationships: good support system and poor relationship with children      Hospital Course: 7/25/22:  Patient received first dose of scheduled Seroquel 50 mg PO QHS overnight, but had poor sleep, frequently getting up from bed and becoming agitated regarding delusional thought content (per daughter and nursing). Patient seen sitting up at bedside this morning eating a breakfast tray. Her affect was labile, ranging from angry/irritable to tearful to laughing/excited. She continues to have hyperreligious thoughts, is fixated on the thermostat in her room, and intermittently anxious/agitated per daughter. She continues to have FOI, pressured speech, but is also disoriented today. She acknowledges feeling confused and is reorientable, but listed time and place as [address] [place] [address].    7/26/22:  Patient's QHS Seroquel was discontinued and she received her first dose of Risperdal 0.5 mg PO QHS overnight. She reported only getting about a half hour of sleep because she needed to stay up all night to comfort [facility] in the [facility]. Pt hyperfixated on biblical content. Still interruptive but speech less pressures and rapid. She is oriented to person, place, date, and situation. She complained of a headache this morning and planned to drink more water today as she feels dehydrated. She continues to be help-seeking and wants to be in the hospital until she starts  "to feel like herself again.    7/27/22:  Per chart, pt was awake and intermittently agitated overnight. She frequently got up out of bed with and without assistance (despite fall precautions and instructions to wait for assistance). An incident was recorded in the chart around 3 am in which the pt "fell" to the ground while being assisted from the bathroom to her bed. She was being assisted by nursing staff but stated "I'm going down" and required multiple staff members to assist her in walking. Pt denied SOB, dizziness, LOC, vision changes, all other symptoms at that time. Staff described this incident as seemingly bizarre delusional behavior, but pt believed it to be a genuine fall, describing her legs feeling weak at that time.     Per nursing, pt was attempting to enter other pts' rooms and sit on staff's lap in the hallway overnight. She also reportedly became physically threatening with staff who tried to help position her this morning. On evaluation this morning, pt continues to be irritable, labile, and interruptive, but her speech is less pressured and her thoughts are more organized. She continued to express hyperreligious thought content, but seemed more oriented to reality, questioning whether her thoughts/"epiphanies" were "real or an allegory". She feels her "revelations" are coming less frequently but she still feels intermittently confused and is frustrated with her lack of sleep. Pt felt she got about 30 mins of sleep overnight; nursing documented 1.5 hrs of sleep (slight improvement from previous nights). She was oriented to person, place, time. Believed her hospital stay has been 2 days long, despite 3rd day here.    7/28/22:  Overnight, pt had another fall/weakness episode requiring assistance to return to bed from bathroom. She received scheduled Risperdal 1 mg PO scheduled and 0.5 mg PRN @ 0216 for agitation. Pt reportedly did not respond to the PRN medication and continued to be elevated, " "irritable, and not sleeping. This morning, pt was sleeping but rousable for interview. She continued to be irritable and hyperreligious, referring to her "visions" and sleeping when God wants her to. She was oriented x3 and denied complaints other than wanting to sleep and being tired of getting interrupted. Interview terminated due to pt's irritability.          Family History    None       Tobacco Use    Smoking status: Current Every Day Smoker     Types: Cigarettes    Smokeless tobacco: Never Used    Tobacco comment: smoked for 30-35 years per daughter quit many years ago   Substance and Sexual Activity    Alcohol use: Yes     Comment: socially less than one a month    Drug use: No    Sexual activity: Not Currently     Psychotherapeutics (From admission, onward)                Start     Stop Route Frequency Ordered    07/27/22 1612  risperiDONE disintegrating tablet 0.5 mg         -- Oral Every 8 hours PRN 07/27/22 1512    07/27/22 1610  OLANZapine injection 2.5 mg         -- IM Every 8 hours PRN 07/27/22 1510 07/26/22 2100  risperiDONE tablet 1 mg         -- Oral Nightly 07/26/22 1213               Objective:     Vital Signs (Most Recent):  Temp: 98 °F (36.7 °C) (07/28/22 0730)  Pulse: 83 (07/28/22 0730)  Resp: 18 (07/28/22 0730)  BP: 128/60 (07/28/22 0730)  SpO2: 97 % (07/28/22 0730)   Vital Signs (24h Range):  Temp:  [97.6 °F (36.4 °C)-98.5 °F (36.9 °C)] 98 °F (36.7 °C)  Pulse:  [83-91] 83  Resp:  [17-18] 18  SpO2:  [89 %-97 %] 97 %  BP: (118-147)/(56-65) 128/60     Height: 5' 3" (160 cm)  Weight: 80.3 kg (177 lb)  Body mass index is 31.35 kg/m².    No intake or output data in the 24 hours ending 07/28/22 1051      MSE:  Orientation: oriented to person, place, time, situation  Speech: somewhat less rapid/pressured, still interruptive  Language: fluent  Mood: irritable  Affect: irritable, drowsy, labile  Thought Process: FOI, tangential  Associations: loose  Thought Content: No SI/HI/AVH. +delusions, " grandiose, hyperreligious  Memory: Recent intact, Remote intact  Attention and Concentration: Distractible  Fund of Knowledge: Appropriate for education level  Insight: Fair  Judgment: Limited    Significant Labs: Last 24 Hours:   Recent Lab Results         07/28/22  0334        Albumin 3.0       Alkaline Phosphatase 78       ALT 15       Anion Gap 8       AST 14       Baso # 0.05       Basophil % 1.0       BILIRUBIN TOTAL 0.4  Comment: For infants and newborns, interpretation of results should be based  on gestational age, weight and in agreement with clinical  observations.    Premature Infant recommended reference ranges:  Up to 24 hours.............<8.0 mg/dL  Up to 48 hours............<12.0 mg/dL  3-5 days..................<15.0 mg/dL  6-29 days.................<15.0 mg/dL         BUN 23       Calcium 9.1       Chloride 105       CO2 25       Creatinine 0.8       Differential Method Automated       eGFR if  >60.0       eGFR if non  >60.0  Comment: Calculation used to obtain the estimated glomerular filtration  rate (eGFR) is the CKD-EPI equation.          Eos # 0.3       Eosinophil % 6.0       Glucose 154       Gran # (ANC) 3.3       Gran % 67.5       Hematocrit 36.6       Hemoglobin 11.6       Immature Grans (Abs) 0.02  Comment: Mild elevation in immature granulocytes is non specific and   can be seen in a variety of conditions including stress response,   acute inflammation, trauma and pregnancy. Correlation with other   laboratory and clinical findings is essential.         Immature Granulocytes 0.4       Lymph # 0.7       Lymph % 14.7       Magnesium 2.1       MCH 28.3       MCHC 31.7       MCV 89       Mono # 0.5       Mono % 10.4       MPV 10.4       nRBC 0       Phosphorus 3.9       Platelets 170       Potassium 3.8       PROTEIN TOTAL 6.0       RBC 4.10       RDW 13.2       Sodium 138       WBC 4.82               Significant Imaging: None       Scheduled Medications:    enoxaparin  40 mg Subcutaneous Daily    famotidine  20 mg Oral Daily    levothyroxine  50 mcg Oral Before breakfast    losartan  100 mg Oral Daily    melatonin  9 mg Oral Nightly    miconazole   Topical (Top) BID    risperiDONE  1 mg Oral QHS       PRN Medications:  acetaminophen, albuterol-ipratropium, dextrose 10%, dextrose 10%, glucagon (human recombinant), glucose, glucose, naloxone, OLANZapine, risperiDONE, sodium chloride 0.9%    Review of patient's allergies indicates:   Allergen Reactions    Penicillins Hives and Shortness Of Breath       Assessment/Plan:     * Acute psychosis  ASSESSMENT     Carmina Arcos is a 89 y.o. female with a past psychiatric history of SIPD, who [place] [facility] [place]. Her symptoms of insomnia, hyperactivity, impulsivity, and delusions are likely 2/2 recent course of steroids. However, these symptoms are endangering her and have been difficult for her family to manage, especially due to the delay in seeing an outpatient provider. Pt would benefit from hospital admission to psychiatrically stabilize, reinstate sleep-wake cycle, rule out medical etiologies of current symptoms, and stay safe while substances continue to wash out.    IMPRESSION  Substance-induced psychotic disorder  R/o bipolar disorder    RECOMMENDATION(S)      1. Scheduled Medication(s):  - Start Depakote 250 mg PO QHS to encourage nighttime sleep  - Continue Risperdal 1 mg PO QHS for acute sujata  - Can give Zyprexa 2.5 mg IM if refusing PO Risperdal  - Agree with starting Melatonin 9 mg PO QHS  - Primary team discussed starting Trazodone - we feel that the risks of orthostatic hypotension outweigh the potential benefit, which is likely minimal in this acutely manic patient    2. PRN Medication(s):  - Continue Risperdal 0.5 mg PO q8hrs PRN non-redirectable agitation  - Recommend changing PRN Risperdal to PRN Zyprexa 2.5 mg PO or IM q8hrs PRN  - Nursing seems unaware of PRN IM option per  conversation this morning - would emphasize this option is available to nursing staff    3.  Monitor:  - Please obtain daily EKG to monitor QTc    4. Legal Status/Precaution(s):  - Patient does not meet criteria for PEC at this time. Patient does appear gravely disabled due to a psychiatric illness but is help-seeking and not contesting her admission. She may not have full capacity to refuse meds at this time given her altered mentation.  - Recommend fall precautions and strict bed rest with alarm on, low bed height, to avoid falls due to potential orthostatic hypotension secondary to Risperdal in this elderly patient.      This patient was seen and discussed with Dr. Jefferson. Thank you for the consult - Psychiatry will continue to follow. Please reach out with any questions/concerns.         Need for Continued Hospitalization:  Requires ongoing hospitalization for stabilization of medications. and No need for inpatient psychiatric hospitalization. Continue medical care as per the primary team.    Anticipated Disposition:  Home or Self Care vs SNF    Total time:  35 with greater than 50% of this time spent in counseling and/or coordination of care.       Alba Tyson MD   Psychiatry  [facility] - Med Surg

## 2022-07-29 PROCEDURE — 94761 N-INVAS EAR/PLS OXIMETRY MLT: CPT

## 2022-07-29 PROCEDURE — 99226 PR SUBSEQUENT OBSERVATION CARE,LEVEL III: CPT | Mod: GC,,, | Performed by: STUDENT IN AN ORGANIZED HEALTH CARE EDUCATION/TRAINING PROGRAM

## 2022-07-29 PROCEDURE — 27000221 HC OXYGEN, UP TO 24 HOURS

## 2022-07-29 PROCEDURE — G0378 HOSPITAL OBSERVATION PER HR: HCPCS

## 2022-07-29 PROCEDURE — 94640 AIRWAY INHALATION TREATMENT: CPT

## 2022-07-29 PROCEDURE — 99900035 HC TECH TIME PER 15 MIN (STAT)

## 2022-07-29 PROCEDURE — 63600175 PHARM REV CODE 636 W HCPCS: Performed by: STUDENT IN AN ORGANIZED HEALTH CARE EDUCATION/TRAINING PROGRAM

## 2022-07-29 PROCEDURE — 96372 THER/PROPH/DIAG INJ SC/IM: CPT | Performed by: STUDENT IN AN ORGANIZED HEALTH CARE EDUCATION/TRAINING PROGRAM

## 2022-07-29 PROCEDURE — 25000242 PHARM REV CODE 250 ALT 637 W/ HCPCS: Performed by: STUDENT IN AN ORGANIZED HEALTH CARE EDUCATION/TRAINING PROGRAM

## 2022-07-29 PROCEDURE — 99226 PR SUBSEQUENT OBSERVATION CARE,LEVEL III: ICD-10-PCS | Mod: GC,,, | Performed by: STUDENT IN AN ORGANIZED HEALTH CARE EDUCATION/TRAINING PROGRAM

## 2022-07-29 PROCEDURE — 25000003 PHARM REV CODE 250: Performed by: HOSPITALIST

## 2022-07-29 PROCEDURE — 25000003 PHARM REV CODE 250: Performed by: STUDENT IN AN ORGANIZED HEALTH CARE EDUCATION/TRAINING PROGRAM

## 2022-07-29 RX ORDER — DIVALPROEX SODIUM 250 MG/1
250 TABLET, DELAYED RELEASE ORAL NIGHTLY
Status: DISCONTINUED | OUTPATIENT
Start: 2022-07-29 | End: 2022-07-29

## 2022-07-29 RX ORDER — OLANZAPINE 10 MG/2ML
1.25 INJECTION, POWDER, FOR SOLUTION INTRAMUSCULAR EVERY 8 HOURS PRN
Status: DISCONTINUED | OUTPATIENT
Start: 2022-07-29 | End: 2022-08-08

## 2022-07-29 RX ORDER — DIVALPROEX SODIUM 125 MG/1
125 CAPSULE, COATED PELLETS ORAL EVERY 12 HOURS
Status: DISCONTINUED | OUTPATIENT
Start: 2022-07-29 | End: 2022-07-30

## 2022-07-29 RX ADMIN — ENOXAPARIN SODIUM 40 MG: 40 INJECTION SUBCUTANEOUS at 04:07

## 2022-07-29 RX ADMIN — FAMOTIDINE 20 MG: 20 TABLET ORAL at 08:07

## 2022-07-29 RX ADMIN — MICONAZOLE NITRATE: 20 CREAM TOPICAL at 11:07

## 2022-07-29 RX ADMIN — LOSARTAN POTASSIUM 100 MG: 50 TABLET, FILM COATED ORAL at 08:07

## 2022-07-29 RX ADMIN — IPRATROPIUM BROMIDE AND ALBUTEROL SULFATE 3 ML: .5; 3 SOLUTION RESPIRATORY (INHALATION) at 05:07

## 2022-07-29 NOTE — PLAN OF CARE
Patient drowsy but easily to arouse. Patient has periods when is awake and go back to sleep. Family at bedside. Patient confused. Family request to speak with MD regarding patient care. Patient was wheezing respiratory therapy was called [facility] patient refused breathing treatment. Denies pain. Call light in reach.

## 2022-07-29 NOTE — SUBJECTIVE & OBJECTIVE
"    Family History    None       Tobacco Use    Smoking status: Current Every Day Smoker     Types: Cigarettes    Smokeless tobacco: Never Used    Tobacco comment: smoked for 30-35 years per daughter quit many years ago   Substance and Sexual Activity    Alcohol use: Yes     Comment: socially less than one a month    Drug use: No    Sexual activity: Not Currently     Psychotherapeutics (From admission, onward)                Start     Stop Route Frequency Ordered    07/28/22 1506  OLANZapine injection 2.5 mg         -- IM Every 8 hours PRN 07/28/22 1507    07/27/22 1612  risperiDONE disintegrating tablet 0.5 mg         -- Oral Every 8 hours PRN 07/27/22 1512    07/26/22 2100  risperiDONE tablet 1 mg         -- Oral Nightly 07/26/22 1213               Objective:     Vital Signs (Most Recent):  Temp: 98 °F (36.7 °C) (07/29/22 1056)  Pulse: 101 (07/29/22 1056)  Resp: 20 (07/29/22 1056)  BP: 132/63 (07/29/22 1056)  SpO2: 95 % (07/29/22 1056) Vital Signs (24h Range):  Temp:  [97.4 °F (36.3 °C)-99.7 °F (37.6 °C)] 98 °F (36.7 °C)  Pulse:  [] 101  Resp:  [16-20] 20  SpO2:  [94 %-96 %] 95 %  BP: (127-149)/(60-67) 132/63     Height: 5' 3" (160 cm)  Weight: 80.3 kg (177 lb)  Body mass index is 31.35 kg/m².    No intake or output data in the 24 hours ending 07/29/22 1258    Mental Status Exam:  Appearance: age appropriate, well nourished, casually dressed, lying in bed  Behavior/Cooperation: normal, cooperative  Speech: normal tone, normal rate, normal pitch, normal volume  Mood: fine  Affect: expansive  Thought Process: loose associations  Thought Content: delusions: yes, grandiose and hyperreligious. No SI/HI/AVH.    Orientation: person, place, situation  Memory: Recent and remote intact  Attention Span/Concentration: Impaired but improving  Insight: fair  Judgment: limited    Significant Labs: Last 24 Hours:   Recent Lab Results       None            Significant Imaging: None  "

## 2022-07-29 NOTE — PROGRESS NOTES
"[facility] Surg  Psychiatry  Progress Note    Patient Name: Carmina Arcos  MRN: [id number]   Code Status: Full Code  Admission Date: 7/24/2022  Hospital Length of Stay: 0 days  Expected Discharge Date: 7/30/2022  Attending Physician: John Nguyen MD  Primary Care Provider: Dick Graham MD    Current Legal Status: Uncontested    Patient information was obtained from patient, relative(s), ER records and primary team.       Subjective:     Patient is a 89 y.o., female, presents with:    Principal Problem:Acute psychosis    Chief Complaint: sujata/psychosis    HPI:   Carmina Arcos is a 89 y.o. female with a past psychiatric history of substance-induced psychotic disorder who presented to [facility] due to Manic behavior. Psychiatry was consulted for "sujata x several days, multiple ED visits".    Per Primary Team:  89 yo W with pmhx COPD, anxiety, depression, HTN, breast CA presents with family for psych evaluation.  History is assisted by the patient and the patient's daughter.  They note that patient was having coughing flares of COPD 3 weeks ago and she was started on course of prednisone by a family member.  She was on the prednisone for almost 2 weeks.  The prednisone has since discontinued that she continues to have manic symptoms including insomnia, reckless spending behavior, typical references Septra.  She was seen in the emergency department several days ago and discharged home with close observation by family members and outpatient psychiatry follow-up.  That follow-up is in 6 days.  Family states that she will need a sooner psychiatric evaluation.  She did have history of manic episode many years ago in the setting of taking a diet pill.  She was admitted inpatient time.  The patient does report some foul-smelling odor in her urine and is concerned that she has a kidney infection.  Otherwise she is essentially without complaints.     The history is provided by the patient and a relative. No language " " was used.     Per Psychiatry:  Patient was seen in the ED with daughter at bedside; pt and daughter provided history together. Pt was alert, oriented x4, rapid pressured interruptive speech, euthymic affect. They report about a week of psychiatric symptoms including excitability/irritability, anxiety, insomnia, hyperactivity, visual hallucinations, hyperreligious and grandiose delusions. Pt has been preoccupied with numerology and believes her family is part of a biblical story and that she has been in the [employer] [facility] with Lázaro. She has also been impulsively shopping and spending large amounts of money. She has intermittent confusion, FOI, BALA on exam.    Pt has a history of COPD and started a tapered 2-wk course of PO Prednisone for a presumed exacerbation, which she got from her son (a nurse and RT). Before the course was completed, the pt's psychiatric symptoms started as described above. Family was concerned that the Prednisone may be causing her symptoms but they could not convince her to stop taking it. They presented to the ED several times since then and were initially comfortable with outpatient follow-up with pt's former psychiatrist, Dr. Ceballos, on Friday 7/29. However, they have become increasingly concerned with pt's symptoms and feel she may be a danger to herself.     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 She speaks several languages and has no known history of cognitive decline. Her psychiatric history includes one substance-induced psychotic episode in 1999. She took some stimulant diet pills, developed insomnia and visual hallucinations, and was admitted to [facility]. She was started on Risperdal and Zoloft, which she continued to see Dr. Ceballos for for some time. Eventually her symptoms resolved and she went back to her "relatively high-strung" baseline, per daughter. She has been out of Dr. Ceballos's care " "and not having psychiatric issues for the past 10+ years. Pt's daughter also reports a remote suicide attempt over 30 yrs ago, via Benadryl overdose.    Prior to the course of Prednisone, pt was at baseline with none of her current symptoms. She last took Prednisone on Wednesday 7/20 but the symptoms have persisted. Pt and family are amenable to inpt hospitalization for her current symptoms.    Medical Review of Systems:  Complete review of systems performed covering Constitutional, Eyes, ENT/Mouth, Cardiovascular, Respiratory, Gastrointestinal, Genitourinary, Musculoskeletal, Skin, Neurologic, Endocrine, Heme/Lymph, and Allergy/Immune.     Complete review of systems was negative with the exception of the following positive symptoms: "funny feeling" in pt's gums/teeth    Psychiatric Review of Systems-is patient experiencing or having changes in  sleep: yes, insomnia  appetite: no  weight: no  energy/anergy: yes, hyperactive  interest/pleasure/anhedonia: no  somatic symptoms: no  libido: no  anxiety/panic: yes  guilty/hopelessness: no  concentration: yes  S.I.B.s/risky behavior: yes, impulsive shopping  any drugs: no  alcohol: no     Allergies:  Penicillins    Past Medical/Surgical History:  Past Medical History:   Diagnosis Date    Anxiety     Breast cancer 2000    right (lumpectomy and radiation)    Broken bones     Colon cancer     COPD (chronic obstructive pulmonary disease)     Depression     depression (no meds now)    Hypertension      Past Surgical History:   Procedure Laterality Date    APPENDECTOMY      BREAST LUMPECTOMY Right     colon operation       HYSTERECTOMY      Pt states still have ovaries     TONSILLECTOMY         Past Psychiatric History:  Previous Medication Trials:  yes, Risperdal and Zoloft    Previous Psychiatric Hospitalizations: yes   Previous Suicide Attempts: yes   History of Violence: no  Outpatient Psychiatrist:  Dr. Ceballos (formerly, with plans to re-establish)    Social " History:  Marital Status:   Children: 3   Employment Status/Info: retired  Education: college graduate  Special Ed: no  Housing Status: lives alone  History of phys/sexual abuse: no  Access to gun: no    Substance Abuse History:  Recreational Drugs:  denied  Use of Alcohol: occasional, social use  Rehab History: no   Tobacco Use: no  Use of OTC: denied    Legal History:  Past Charges/Incarcerations: no   Pending charges: no     Family Psychiatric History:   denied    Psychosocial Stressors: health  Functioning Relationships: good support system and poor relationship with children      Hospital Course: 7/25/22:  Patient received first dose of scheduled Seroquel 50 mg PO QHS overnight, but had poor sleep, frequently getting up from bed and becoming agitated regarding delusional thought content (per daughter and nursing). Patient seen sitting up at bedside this morning eating a breakfast tray. Her affect was labile, ranging from angry/irritable to tearful to laughing/excited. She continues to have hyperreligious thoughts, is fixated on the thermostat in her room, and intermittently anxious/agitated per daughter. She continues to have FOI, pressured speech, but is also disoriented today. She acknowledges feeling confused and is reorientable, but listed time and place as [address] [place] [address].    7/26/22:  Patient's QHS Seroquel was discontinued and she received her first dose of Risperdal 0.5 mg PO QHS overnight. She reported only getting about a half hour of sleep because she needed to stay up all night to comfort Láz[facility] in [facility]. Pt hyperfixated on biblical content. Still interruptive but speech less pressures and rapid. She is oriented to person, place, date, and situation. She complained of a headache this morning and planned to drink more water today as she feels dehydrated. She continues to be help-seeking and wants to be in the hospital until she starts to feel like herself  "again.    7/27/22:  Per chart, pt was awake and intermittently agitated overnight. She frequently got up out of bed with and without assistance (despite fall precautions and instructions to wait for assistance). An incident was recorded in the chart around 3 am in which the pt "fell" to the ground while being assisted from the bathroom to her bed. She was being assisted by nursing staff but stated "I'm going down" and required multiple staff members to assist her in walking. Pt denied SOB, dizziness, LOC, vision changes, all other symptoms at that time. Staff described this incident as seemingly bizarre delusional behavior, but pt believed it to be a genuine fall, describing her legs feeling weak at that time.     Per nursing, pt was attempting to enter other pts' rooms and sit on staff's lap in the hallway overnight. She also reportedly became physically threatening with staff who tried to help position her this morning. On evaluation this morning, pt continues to be irritable, labile, and interruptive, but her speech is less pressured and her thoughts are more organized. She continued to express hyperreligious thought content, but seemed more oriented to reality, questioning whether her thoughts/"epiphanies" were "real or an allegory". She feels her "revelations" are coming less frequently but she still feels intermittently confused and is frustrated with her lack of sleep. Pt felt she got about 30 mins of sleep overnight; nursing documented 1.5 hrs of sleep (slight improvement from previous nights). She was oriented to person, place, time. Believed her hospital stay has been 2 days long, despite 3rd day here.    7/28/22:  Overnight, pt had another fall/weakness episode requiring assistance to return to bed from bathroom. She received scheduled Risperdal 1 mg PO scheduled and 0.5 mg PRN @ 0216 for agitation. Pt reportedly did not respond to the PRN medication and continued to be elevated, irritable, and not " "sleeping. This morning, pt was sleeping but rousable for interview. She continued to be irritable and hyperreligious, referring to her "visions" and sleeping when God wants her to. She was oriented x3 and denied complaints other than wanting to sleep and being tired of getting interrupted. Interview terminated due to pt's irritability.    7/29/22:  Patient was agitated yesterday, frequently screaming, crying, and calling family members to discuss Restorationism delusions. She received PRN IM Zyprexa 2.5 mg yesterday afternoon and slept for several hours afterward. She received her first dose of QHS VPA yesterday and slept well throughout the night, per nursing. Pt was sleeping and difficult to arouse this morning but participated in brief psychiatric interview. She was oriented to time/place and endorsed good sleep overnight. Recalls recent events (daughter bringing baked goods to bedside). She reported she was still having her "visions" but said they were "allegory", not real. Interview terminated due to pt's drowsiness. Per nursing, pt was conversant/oriented this morning with son-in-law at bedside, both remarked how much calmer she was today than previous days.          Family History    None       Tobacco Use    Smoking status: Current Every Day Smoker     Types: Cigarettes    Smokeless tobacco: Never Used    Tobacco comment: smoked for 30-35 years per daughter quit many years ago   Substance and Sexual Activity    Alcohol use: Yes     Comment: socially less than one a month    Drug use: No    Sexual activity: Not Currently     Psychotherapeutics (From admission, onward)                Start     Stop Route Frequency Ordered    07/28/22 1506  OLANZapine injection 2.5 mg         -- IM Every 8 hours PRN 07/28/22 1507    07/27/22 1612  risperiDONE disintegrating tablet 0.5 mg         -- Oral Every 8 hours PRN 07/27/22 1512    07/26/22 2100  risperiDONE tablet 1 mg         -- Oral Nightly 07/26/22 1213           " "    Objective:     Vital Signs (Most Recent):  Temp: 98 °F (36.7 °C) (07/29/22 1056)  Pulse: 101 (07/29/22 1056)  Resp: 20 (07/29/22 1056)  BP: 132/63 (07/29/22 1056)  SpO2: 95 % (07/29/22 1056) Vital Signs (24h Range):  Temp:  [97.4 °F (36.3 °C)-99.7 °F (37.6 °C)] 98 °F (36.7 °C)  Pulse:  [] 101  Resp:  [16-20] 20  SpO2:  [94 %-96 %] 95 %  BP: (127-149)/(60-67) 132/63     Height: 5' 3" (160 cm)  Weight: 80.3 kg (177 lb)  Body mass index is 31.35 kg/m².    No intake or output data in the 24 hours ending 07/29/22 1258    Mental Status Exam:  Appearance: age appropriate, well nourished, casually dressed, lying in bed  Behavior/Cooperation: normal, cooperative  Speech: normal tone, normal rate, normal pitch, normal volume  Mood: fine  Affect: expansive  Thought Process: loose associations  Thought Content: delusions: yes, grandiose and hyperreligious. No SI/HI/AVH.    Orientation: person, place, situation  Memory: Recent and remote intact  Attention Span/Concentration: Impaired but improving  Insight: fair  Judgment: limited    Significant Labs: Last 24 Hours:   Recent Lab Results       None            Significant Imaging: None       Scheduled Medications:   divalproex  250 mg Oral QHS    enoxaparin  40 mg Subcutaneous Daily    famotidine  20 mg Oral Daily    levothyroxine  50 mcg Oral Before breakfast    losartan  100 mg Oral Daily    miconazole   Topical (Top) BID    risperiDONE  1 mg Oral QHS       PRN Medications:  acetaminophen, albuterol-ipratropium, dextrose 10%, dextrose 10%, glucagon (human recombinant), glucose, glucose, naloxone, OLANZapine, risperiDONE, sodium chloride 0.9%    Review of patient's allergies indicates:   Allergen Reactions    Penicillins Hives and Shortness Of Breath       Assessment/Plan:     * Acute psychosis  ASSESSMENT     Carmina Arcos is a 89 y.o. female with a past psychiatric history of SIPD, who presented to the [facility] due to manic behavior. Her symptoms of insomnia, " hyperactivity, impulsivity, and delusions are likely 2/2 recent course of steroids. However, these symptoms are endangering her and have been difficult for her family to manage, especially due to the delay in seeing an outpatient provider. Pt would benefit from hospital admission to psychiatrically stabilize, reinstate sleep-wake cycle, rule out medical etiologies of current symptoms, and stay safe while substances continue to wash out.    IMPRESSION  Substance-induced psychotic disorder  R/o bipolar disorder    RECOMMENDATION(S)      1. Scheduled Medication(s):  - Change Depakote 250 mg PO QHS to Depakote sprinkles (125 mg capsule x 2), with soft foods (apple sauce, pudding, Jello) to encourage nighttime sleep (pt reports difficulty swallowing)  - Continue Risperdal 1 mg PO QHS for acute sujata  - Can give Zyprexa 1.25 mg IM if refusing PO Risperdal  - Agree with starting Melatonin 9 mg PO QHS  - Primary team discussed starting Trazodone - we feel that the risks of orthostatic hypotension outweigh the potential benefit, which is likely minimal in this acutely manic patient    2. PRN Medication(s):  - Continue Risperdal 0.5 mg PO q8hrs PRN non-redirectable agitation  - Recommend changing PRN Zyprexa 2.5 mg PO or IM to 1.25 mg q8hrs PRN  - Nursing seems unaware of PRN IM option per conversation this morning - would emphasize this option is available to nursing staff    3.  Monitor:  - Please obtain daily EKG to monitor QTc    4. Legal Status/Precaution(s):  - Patient does not meet criteria for PEC at this time. Patient does appear gravely disabled due to a psychiatric illness but is help-seeking and not contesting her admission. She may not have full capacity to refuse meds at this time given her altered mentation.  - Recommend fall precautions and strict bed rest with alarm on, low bed height, to avoid falls due to potential orthostatic hypotension secondary to Risperdal in this elderly patient.      This patient was  seen and discussed with Dr. Jefferson. Thank you for the consult - Psychiatry will continue to follow. Please reach out with any questions/concerns.           Need for Continued Hospitalization:  No need for inpatient psychiatric hospitalization. Continue medical care as per the primary team.    Anticipated Disposition:  Home or Self Care vs SNF    Total time:  35 with greater than 50% of this time spent in counseling and/or coordination of care.       Alba Tyson MD   Psychiatry  [facility] Surg

## 2022-07-29 NOTE — ASSESSMENT & PLAN NOTE
ASSESSMENT     Carmina Arcos is a 89 y.o. female with a past psychiatric history of SIPD, who presented to the [facility] [place] [facility] due to manic behavior. Her symptoms of insomnia, hyperactivity, impulsivity, and delusions are likely 2/2 recent course of steroids. However, these symptoms are endangering her and have been difficult for her family to manage, especially due to the delay in seeing an outpatient provider. Pt would benefit from hospital admission to psychiatrically stabilize, reinstate sleep-wake cycle, rule out medical etiologies of current symptoms, and stay safe while substances continue to wash out.    IMPRESSION  Substance-induced psychotic disorder  R/o bipolar disorder    RECOMMENDATION(S)      1. Scheduled Medication(s):  - Change Depakote 250 mg PO QHS to Depakote sprinkles (125 mg capsule x 2), with soft foods (apple sauce, pudding, Jello) to encourage nighttime sleep (pt reports difficulty swallowing)  - Continue Risperdal 1 mg PO QHS for acute sujata  - Can give Zyprexa 1.25 mg IM if refusing PO Risperdal  - Agree with starting Melatonin 9 mg PO QHS  - Primary team discussed starting Trazodone - we feel that the risks of orthostatic hypotension outweigh the potential benefit, which is likely minimal in this acutely manic patient    2. PRN Medication(s):  - Continue Risperdal 0.5 mg PO q8hrs PRN non-redirectable agitation  - Recommend changing PRN Zyprexa 2.5 mg PO or IM to 1.25 mg q8hrs PRN  - Nursing seems unaware of PRN IM option per conversation this morning - would emphasize this option is available to nursing staff    3.  Monitor:  - Please obtain daily EKG to monitor QTc    4. Legal Status/Precaution(s):  - Patient does not meet criteria for PEC at this time. Patient does appear gravely disabled due to a psychiatric illness but is help-seeking and not contesting her admission. She may not have full capacity to refuse meds at this time given her altered mentation.  - Recommend fall precautions and  strict bed rest with alarm on, low bed height, to avoid falls due to potential orthostatic hypotension secondary to Risperdal in this elderly patient.      This patient was seen and discussed with Dr. Jefferson. Thank you for the consult - Psychiatry will continue to follow. Please reach out with any questions/concerns.

## 2022-07-30 LAB
ALBUMIN SERPL BCP-MCNC: 2.8 G/DL (ref 3.5–5.2)
ALP SERPL-CCNC: 76 U/L (ref 55–135)
ALT SERPL W/O P-5'-P-CCNC: 14 U/L (ref 10–44)
ANION GAP SERPL CALC-SCNC: 7 MMOL/L (ref 8–16)
AST SERPL-CCNC: 12 U/L (ref 10–40)
BASOPHILS # BLD AUTO: 0.04 K/UL (ref 0–0.2)
BASOPHILS NFR BLD: 0.6 % (ref 0–1.9)
BILIRUB SERPL-MCNC: 0.4 MG/DL (ref 0.1–1)
BUN SERPL-MCNC: 11 MG/DL (ref 10–30)
CALCIUM SERPL-MCNC: 8.9 MG/DL (ref 8.7–10.5)
CHLORIDE SERPL-SCNC: 104 MMOL/L (ref 95–110)
CO2 SERPL-SCNC: 29 MMOL/L (ref 23–29)
CREAT SERPL-MCNC: 0.6 MG/DL (ref 0.5–1.4)
DIFFERENTIAL METHOD: ABNORMAL
EOSINOPHIL # BLD AUTO: 0.2 K/UL (ref 0–0.5)
EOSINOPHIL NFR BLD: 2.8 % (ref 0–8)
ERYTHROCYTE [DISTWIDTH] IN BLOOD BY AUTOMATED COUNT: 13.4 % (ref 11.5–14.5)
EST. GFR  (AFRICAN AMERICAN): >60 ML/MIN/1.73 M^2
EST. GFR  (NON AFRICAN AMERICAN): >60 ML/MIN/1.73 M^2
GLUCOSE SERPL-MCNC: 157 MG/DL (ref 70–110)
HCT VFR BLD AUTO: 36.5 % (ref 37–48.5)
HGB BLD-MCNC: 11.1 G/DL (ref 12–16)
IMM GRANULOCYTES # BLD AUTO: 0.02 K/UL (ref 0–0.04)
IMM GRANULOCYTES NFR BLD AUTO: 0.3 % (ref 0–0.5)
LYMPHOCYTES # BLD AUTO: 0.8 K/UL (ref 1–4.8)
LYMPHOCYTES NFR BLD: 12.9 % (ref 18–48)
MAGNESIUM SERPL-MCNC: 2.1 MG/DL (ref 1.6–2.6)
MCH RBC QN AUTO: 27.6 PG (ref 27–31)
MCHC RBC AUTO-ENTMCNC: 30.4 G/DL (ref 32–36)
MCV RBC AUTO: 91 FL (ref 82–98)
MONOCYTES # BLD AUTO: 0.6 K/UL (ref 0.3–1)
MONOCYTES NFR BLD: 9.6 % (ref 4–15)
NEUTROPHILS # BLD AUTO: 4.8 K/UL (ref 1.8–7.7)
NEUTROPHILS NFR BLD: 73.8 % (ref 38–73)
NRBC BLD-RTO: 0 /100 WBC
PHOSPHATE SERPL-MCNC: 3 MG/DL (ref 2.7–4.5)
PLATELET # BLD AUTO: 173 K/UL (ref 150–450)
PMV BLD AUTO: 9.8 FL (ref 9.2–12.9)
POTASSIUM SERPL-SCNC: 4.1 MMOL/L (ref 3.5–5.1)
PROCALCITONIN SERPL IA-MCNC: 0.02 NG/ML
PROT SERPL-MCNC: 5.8 G/DL (ref 6–8.4)
RBC # BLD AUTO: 4.02 M/UL (ref 4–5.4)
SARS-COV-2 RDRP RESP QL NAA+PROBE: NEGATIVE
SODIUM SERPL-SCNC: 140 MMOL/L (ref 136–145)
WBC # BLD AUTO: 6.45 K/UL (ref 3.9–12.7)

## 2022-07-30 PROCEDURE — 99226 PR SUBSEQUENT OBSERVATION CARE,LEVEL III: CPT | Mod: GC,,, | Performed by: STUDENT IN AN ORGANIZED HEALTH CARE EDUCATION/TRAINING PROGRAM

## 2022-07-30 PROCEDURE — 84100 ASSAY OF PHOSPHORUS: CPT | Performed by: STUDENT IN AN ORGANIZED HEALTH CARE EDUCATION/TRAINING PROGRAM

## 2022-07-30 PROCEDURE — 99226 PR SUBSEQUENT OBSERVATION CARE,LEVEL III: ICD-10-PCS | Mod: ,,, | Performed by: PSYCHIATRY & NEUROLOGY

## 2022-07-30 PROCEDURE — 80053 COMPREHEN METABOLIC PANEL: CPT | Performed by: STUDENT IN AN ORGANIZED HEALTH CARE EDUCATION/TRAINING PROGRAM

## 2022-07-30 PROCEDURE — 93010 EKG 12-LEAD: ICD-10-PCS | Mod: ,,, | Performed by: INTERNAL MEDICINE

## 2022-07-30 PROCEDURE — 36415 COLL VENOUS BLD VENIPUNCTURE: CPT | Performed by: STUDENT IN AN ORGANIZED HEALTH CARE EDUCATION/TRAINING PROGRAM

## 2022-07-30 PROCEDURE — 97535 SELF CARE MNGMENT TRAINING: CPT

## 2022-07-30 PROCEDURE — 93010 ELECTROCARDIOGRAM REPORT: CPT | Mod: ,,, | Performed by: INTERNAL MEDICINE

## 2022-07-30 PROCEDURE — 92610 EVALUATE SWALLOWING FUNCTION: CPT

## 2022-07-30 PROCEDURE — 83735 ASSAY OF MAGNESIUM: CPT | Performed by: STUDENT IN AN ORGANIZED HEALTH CARE EDUCATION/TRAINING PROGRAM

## 2022-07-30 PROCEDURE — 99226 PR SUBSEQUENT OBSERVATION CARE,LEVEL III: ICD-10-PCS | Mod: GC,,, | Performed by: STUDENT IN AN ORGANIZED HEALTH CARE EDUCATION/TRAINING PROGRAM

## 2022-07-30 PROCEDURE — 96372 THER/PROPH/DIAG INJ SC/IM: CPT | Performed by: STUDENT IN AN ORGANIZED HEALTH CARE EDUCATION/TRAINING PROGRAM

## 2022-07-30 PROCEDURE — 25000003 PHARM REV CODE 250: Performed by: STUDENT IN AN ORGANIZED HEALTH CARE EDUCATION/TRAINING PROGRAM

## 2022-07-30 PROCEDURE — 93010 ELECTROCARDIOGRAM REPORT: CPT | Mod: 76,,, | Performed by: INTERNAL MEDICINE

## 2022-07-30 PROCEDURE — 93005 ELECTROCARDIOGRAM TRACING: CPT

## 2022-07-30 PROCEDURE — G0378 HOSPITAL OBSERVATION PER HR: HCPCS

## 2022-07-30 PROCEDURE — 84145 PROCALCITONIN (PCT): CPT

## 2022-07-30 PROCEDURE — 99226 PR SUBSEQUENT OBSERVATION CARE,LEVEL III: CPT | Mod: ,,, | Performed by: PSYCHIATRY & NEUROLOGY

## 2022-07-30 PROCEDURE — 85025 COMPLETE CBC W/AUTO DIFF WBC: CPT | Performed by: STUDENT IN AN ORGANIZED HEALTH CARE EDUCATION/TRAINING PROGRAM

## 2022-07-30 PROCEDURE — 25000003 PHARM REV CODE 250

## 2022-07-30 PROCEDURE — U0002 COVID-19 LAB TEST NON-CDC: HCPCS | Performed by: STUDENT IN AN ORGANIZED HEALTH CARE EDUCATION/TRAINING PROGRAM

## 2022-07-30 PROCEDURE — 36415 COLL VENOUS BLD VENIPUNCTURE: CPT

## 2022-07-30 PROCEDURE — 63600175 PHARM REV CODE 636 W HCPCS: Performed by: STUDENT IN AN ORGANIZED HEALTH CARE EDUCATION/TRAINING PROGRAM

## 2022-07-30 PROCEDURE — 25000003 PHARM REV CODE 250: Performed by: HOSPITALIST

## 2022-07-30 RX ORDER — DIVALPROEX SODIUM 125 MG/1
125 CAPSULE, COATED PELLETS ORAL 2 TIMES DAILY
Status: DISCONTINUED | OUTPATIENT
Start: 2022-07-30 | End: 2022-07-31

## 2022-07-30 RX ADMIN — MICONAZOLE NITRATE: 20 CREAM TOPICAL at 09:07

## 2022-07-30 RX ADMIN — ACETAMINOPHEN 325 MG: 325 SUPPOSITORY RECTAL at 03:07

## 2022-07-30 RX ADMIN — DIVALPROEX SODIUM 125 MG: 125 CAPSULE, COATED PELLETS ORAL at 09:07

## 2022-07-30 RX ADMIN — CEFTRIAXONE 1 G: 1 INJECTION, SOLUTION INTRAVENOUS at 11:07

## 2022-07-30 RX ADMIN — FAMOTIDINE 20 MG: 20 TABLET ORAL at 09:07

## 2022-07-30 RX ADMIN — ENOXAPARIN SODIUM 40 MG: 40 INJECTION SUBCUTANEOUS at 04:07

## 2022-07-30 RX ADMIN — LOSARTAN POTASSIUM 100 MG: 50 TABLET, FILM COATED ORAL at 09:07

## 2022-07-30 NOTE — ASSESSMENT & PLAN NOTE
Ms. Arcos is a 89 yo that  according to daughter after having a 3-4 weeks ago COPD exacerbation, was started on Prednisone by her son who is a nurse. States  that after a few days she started noticing behavioral changes described as flight of ideas, insomnia, rapid speech, alternating irritability with excitability, grandiose delusions auditory hallucinations, fixation with allegories, numerology and Restorationism.     Differential includes but not limited to drug induced delirium like from recent steroids use vs manic episode from mood disorder. Unlikely to be 2/2 to infection given normal WBC and lacks of infectious symptoms. UA is also negative. UDS is also negative. TSH is normal, low likely moran for thyroid condition     - Psychiatry recommends changing seroquel to risperidal at night. 7/25  - Risperidal dose increased form .5 to 1mg  - Continue to follow Psych recs   - Continue to monitor mood and mental status   - Delirium precautions   - Holding further steroids

## 2022-07-30 NOTE — ASSESSMENT & PLAN NOTE
Ms. Arcos is a 89 yo that  according to daughter after having a 3-4 weeks ago COPD exacerbation, was started on Prednisone by her son who is a nurse. States  that after a few days she started noticing behavioral changes described as flight of ideas, insomnia, rapid speech, alternating irritability with excitability, grandiose delusions auditory hallucinations, fixation with allegories, numerology and Baptism.     Differential includes but not limited to drug induced delirium like from recent steroids use vs manic episode from mood disorder. Unlikely to be 2/2 to infection given normal WBC and lacks of infectious symptoms. UA is also negative. UDS is also negative. TSH is normal, low likely moran for thyroid condition     - Continue to follow Psych recs   - Continue to monitor mood and mental status   - Delirium precautions   - Holding further steroids

## 2022-07-30 NOTE — PT/OT/SLP EVAL
"Speech Language Pathology Evaluation  Bedside Swallow    Patient Name:  Carmina Arcos   MRN:  [id number]  Admitting Diagnosis: Acute psychosis    Recommendations:                 General Recommendations:  Dysphagia therapy  Diet recommendations:  Mechanical soft, Thin   Aspiration Precautions: 1 bite/sip at a time, Frequent oral care, HOB to 90 degrees, Remain upright 30 minutes post meal, Small bites/sips and Standard aspiration precautions   General Precautions: Standard, aspiration, fall  Communication strategies:  none    History:     Past Medical History:   Diagnosis Date    Anxiety     Breast cancer 2000    right (lumpectomy and radiation)    Broken bones     Colon cancer     COPD (chronic obstructive pulmonary disease)     Depression     depression (no meds now)    Hypertension     Manic episode without psychotic symptoms, unspecified     due to stimulant diet pilss, resolved       Past Surgical History:   Procedure Laterality Date    APPENDECTOMY      BREAST LUMPECTOMY Right     colon operation       COLON SURGERY      HYSTERECTOMY      Pt states still have ovaries     JOINT REPLACEMENT      TONSILLECTOMY         Social History: Patient lives alone. Totally independent prior to admit. Gave up driving a few years ago. Good family support.     Prior Intubation HX:  None this admission    Modified Barium Swallow: None this admission    Chest X-Rays: 7/29- Slight interval increase in opacification at the left lung base since July 22, 2022, most likely due to atelectasis and possibly a small amount of pleural fluid.    Prior diet: soft/thin    Occupation/hobbies/homemaking: retired teacher, managed rental property    Subjective     "I'm in room 647 at [facility] on [address]"    Spoke with RN prior to entering pt's room . Pt seen bedside for session with daughter present. Alert and agreeable to ST.       Pain/Comfort:  · Pain Rating 1: 0/10  · Pain Rating Post-Intervention 1: " 0/10    Respiratory Status: Nasal cannula    Objective:     Oral Musculature Evaluation  · Oral Musculature: WFL  · Dentition: scattered dentition  · Secretion Management: adequate  · Mucosal Quality: adequate  · Oral Labial Strength and Mobility: WFL  · Lingual Strength and Mobility: WFL  · Volitional Cough: elicited, followed by wheezing  · Volitional Swallow: elicited  · Voice Prior to PO Intake: clear    Bedside Swallow Eval:   Consistencies Assessed:  · Thin liquids via single and consecutive straw sips  · Puree pudding  · Soft solids crackers softened in pudding  · Solids pt declined due to dentition     Oral Phase:   · WFL    Pharyngeal Phase:   · no overt clinical signs/symptoms of aspiration  · no overt clinical signs/symptoms of pharyngeal dysphagia    Compensatory Strategies  · None    Treatment: Provided education to pt and daughter re: role of ST, POC, swallow precautions, recommendations for mechanical soft diet with thin liquids, and plan to f/u to ensure tolerance. They verbalized understanding. White board updated. RN and MD notified of results and recs.      Assessment:     Carmina Arcos is a 89 y.o. female with an SLP diagnosis of mld oral Dysphagia.      Goals:   Multidisciplinary Problems     SLP Goals        Problem: SLP    Goal Priority Disciplines Outcome   SLP Goal     SLP    Description: Goals expected to be met by 8/6:  1. Pt will tolerate least restrictive diet without overt s/sx airway threat.                          Plan:     · Patient to be seen:  3 x/week   · Plan of Care expires:  08/28/22  · Plan of Care reviewed with:  patient, daughter   · SLP Follow-Up:  Yes       Discharge recommendations:   (likely no further ST needs)   Barriers to Discharge:  Level of Skilled Assistance Needed .    Time Tracking:     SLP Treatment Date:   07/30/22  Speech Start Time:  0940  Speech Stop Time:  0954     Speech Total Time (min):  14 min    Billable Minutes: Eval Swallow and Oral Function 6  and Self Care/Home Management Training 8    07/30/2022

## 2022-07-30 NOTE — SUBJECTIVE & OBJECTIVE
"Interval History: Patient was agitated over night and had prn olanzapine and was able to sleep. She stated she "like drunk" in the morning and complained of wheezing but refused nebulization, x ray ordered    Review of Systems   Constitutional:  Negative for chills and fever.   HENT:  Negative for rhinorrhea, sinus pain and sore throat.    Respiratory:  Negative for cough and chest tightness.    Cardiovascular:  Negative for chest pain.   Gastrointestinal:  Negative for abdominal distention, abdominal pain, constipation and diarrhea.   Endocrine: Negative for cold intolerance.   Genitourinary:  Negative for difficulty urinating.   Musculoskeletal: Negative.    Skin: Negative.    Neurological:  Negative for seizures and headaches.   Psychiatric/Behavioral:  Positive for confusion.    Objective:     Vital Signs (Most Recent):  Temp: (!) 100.4 °F (38 °C) (07/29/22 1959)  Pulse: 93 (07/29/22 1959)  Resp: 16 (07/29/22 1959)  BP: 136/63 (07/29/22 1959)  SpO2: (!) 94 % (07/29/22 1959)   Vital Signs (24h Range):  Temp:  [97.4 °F (36.3 °C)-100.4 °F (38 °C)] 100.4 °F (38 °C)  Pulse:  [] 93  Resp:  [16-20] 16  SpO2:  [91 %-96 %] 94 %  BP: (127-141)/(58-65) 136/63     Weight: 80.3 kg (177 lb)  Body mass index is 31.35 kg/m².  No intake or output data in the 24 hours ending 07/29/22 2013     Physical Exam  Constitutional:       General: She is not in acute distress.     Appearance: She is not ill-appearing.   HENT:      Head: Normocephalic and atraumatic.      Right Ear: External ear normal.      Left Ear: External ear normal.      Nose: Nose normal.      Mouth/Throat:      Mouth: Mucous membranes are moist.      Pharynx: Oropharynx is clear.   Eyes:      General: No scleral icterus.     Extraocular Movements: Extraocular movements intact.      Conjunctiva/sclera: Conjunctivae normal.   Cardiovascular:      Rate and Rhythm: Normal rate and regular rhythm.      Heart sounds: No murmur heard.  Pulmonary:      Effort: " Pulmonary effort is normal.      Breath sounds: Wheezing present. No rales.   Abdominal:      General: Abdomen is flat. Bowel sounds are normal. There is no distension.      Palpations: Abdomen is soft.      Tenderness: There is no abdominal tenderness.   Musculoskeletal:         General: No swelling. Normal range of motion.      Cervical back: Normal range of motion.      Right lower leg: Right lower leg edema: hemotoma present, not recent.   Skin:     General: Skin is warm and dry.   Neurological:      General: No focal deficit present.      Mental Status: She is alert.   Psychiatric:      Comments: Patient redirectable, letargic and slurred speech       Significant Labs: All pertinent labs within the past 24 hours have been reviewed.  Recent Lab Results       None            Significant Imaging: I have reviewed all pertinent imaging results/findings within the past 24 hours.

## 2022-07-30 NOTE — PROGRESS NOTES
"[facility]  Progress Note    Patient Name: Carmina Arcos  MRN: [id number]  Patient Class: OP- Observation   Admission Date: 7/24/2022  Length of Stay: 0 days  Attending Physician: John Nguyen MD  Primary Care Provider: Dick Graham MD        Subjective:     Principal Problem:Acute psychosis        HPI:  History provided mostly by daughter Nimco. Ms Grewal is a 89 yo that  according to daughter after having a 3-4 weeks ago COPD exacerbation, was started on Prednisone by her son who is a nurse. States  that after a few days she started noticing behavioral changes described as flight of ideas, insomnia, rapid speech, alternating irritability with excitability, grandiose delusions auditory hallucinations, fixation with allegories, numerology and Ep[employer]alian.  Daughter also affirms, reckless spending. She was taken to [facility] but there was no psych service available. They decided to come to [facility] for psychiatric evaluation.  States previous episode precipitated by "diet pills " in 1999 and had to be committed to [facility] for a week. There she was started on risperidone, its unclear for how long she was on it. Daughter states that the patient also has a history of depression with ne suicide attempt.    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 She currently was living on her own and managed rental property, her family lives close to her home and visit regularly.    PMH: Breast Cancer? Colon Cancer. HTN treated with losartan hydrochlorothiazide and amlodipine, Hypothyroidism treated with levothyroxine, COPD treated with with formoterol-glycopyrrolate , albuterol PRN and Sildenafil given by his son.    Surgical hx positive for bilateral knee replacement, tonsillectomy, hysterectomy, right lumpectomy and colon surgery .    Allergies: Penicillin      Overview/Hospital Course:  Admitted to hospital medicine for " "concerns of drug induced delirium vs sujata. Psychiatry consulted and recommended Risperdal.       Interval History: Patient was agitated over night and had prn olanzapine and was able to sleep. She stated she "like drunk" in the morning and complained of wheezing but refused nebulization, x ray ordered    Review of Systems   Constitutional:  Negative for chills and fever.   HENT:  Negative for rhinorrhea, sinus pain and sore throat.    Respiratory:  Negative for cough and chest tightness.    Cardiovascular:  Negative for chest pain.   Gastrointestinal:  Negative for abdominal distention, abdominal pain, constipation and diarrhea.   Endocrine: Negative for cold intolerance.   Genitourinary:  Negative for difficulty urinating.   Musculoskeletal: Negative.    Skin: Negative.    Neurological:  Negative for seizures and headaches.   Psychiatric/Behavioral:  Positive for confusion.    Objective:     Vital Signs (Most Recent):  Temp: (!) 100.4 °F (38 °C) (07/29/22 1959)  Pulse: 93 (07/29/22 1959)  Resp: 16 (07/29/22 1959)  BP: 136/63 (07/29/22 1959)  SpO2: (!) 94 % (07/29/22 1959)   Vital Signs (24h Range):  Temp:  [97.4 °F (36.3 °C)-100.4 °F (38 °C)] 100.4 °F (38 °C)  Pulse:  [] 93  Resp:  [16-20] 16  SpO2:  [91 %-96 %] 94 %  BP: (127-141)/(58-65) 136/63     Weight: 80.3 kg (177 lb)  Body mass index is 31.35 kg/m².  No intake or output data in the 24 hours ending 07/29/22 2013     Physical Exam  Constitutional:       General: She is not in acute distress.     Appearance: She is not ill-appearing.   HENT:      Head: Normocephalic and atraumatic.      Right Ear: External ear normal.      Left Ear: External ear normal.      Nose: Nose normal.      Mouth/Throat:      Mouth: Mucous membranes are moist.      Pharynx: Oropharynx is clear.   Eyes:      General: No scleral icterus.     Extraocular Movements: Extraocular movements intact.      Conjunctiva/sclera: Conjunctivae normal.   Cardiovascular:      Rate and Rhythm: " Normal rate and regular rhythm.      Heart sounds: No murmur heard.  Pulmonary:      Effort: Pulmonary effort is normal.      Breath sounds: Wheezing present. No rales.   Abdominal:      General: Abdomen is flat. Bowel sounds are normal. There is no distension.      Palpations: Abdomen is soft.      Tenderness: There is no abdominal tenderness.   Musculoskeletal:         General: No swelling. Normal range of motion.      Cervical back: Normal range of motion.      Right lower leg: Right lower leg edema: hemotoma present, not recent.   Skin:     General: Skin is warm and dry.   Neurological:      General: No focal deficit present.      Mental Status: She is alert.   Psychiatric:      Comments: Patient redirectable, letargic and slurred speech       Significant Labs: All pertinent labs within the past 24 hours have been reviewed.  Recent Lab Results       None            Significant Imaging: I have reviewed all pertinent imaging results/findings within the past 24 hours.      Assessment/Plan:      * Acute psychosis  Ms. Arcos is a 89 yo that  according to daughter after having a 3-4 weeks ago COPD exacerbation, was started on Prednisone by her son who is a nurse. States  that after a few days she started noticing behavioral changes described as flight of ideas, insomnia, rapid speech, alternating irritability with excitability, grandiose delusions auditory hallucinations, fixation with allegories, numerology and Denominational.     Differential includes but not limited to drug induced delirium like from recent steroids use vs manic episode from mood disorder. Unlikely to be 2/2 to infection given normal WBC and lacks of infectious symptoms. UA is also negative. UDS is also negative. TSH is normal, low likely moran for thyroid condition     - Psychiatry recommends changing seroquel to risperidal at night. 7/25  - Risperidal dose increased form .5 to 1mg  - Continue to follow Psych recs   - Continue to monitor mood and  mental status   - Delirium precautions   - Holding further steroids       COPD, severity to be determined  - wheezing present today   - Duo Neb refused by patien  -Avoid Steroid nebulizer.    Mood disorder due to a general medical condition  See drug induced delirium     Sensorineural hearing loss (SNHL) of both ears        Hypertension  Home regimen: amlodipine 2.5 mg QD, HCTZ 25 mg QD, Losartan 100 mg QD     Plan: Blood pressure dropped overnight, home meds held except for losartan    Hx of breast cancer  S/p right lumpectomy and radiation       VTE Risk Mitigation (From admission, onward)         Ordered     enoxaparin injection 40 mg  Daily         07/24/22 1332     IP VTE HIGH RISK PATIENT  Once         07/24/22 1332     Place sequential compression device  Until discontinued         07/24/22 1332                Discharge Planning   ANA: 7/30/2022     Code Status: Full Code   Is the patient medically ready for discharge?: No    Reason for patient still in hospital (select all that apply): Patient trending condition  Discharge Plan A: Home with family                  Gadiel Grajeda MD  Department of Hospital Medicine   [facility] - Med Surg

## 2022-07-30 NOTE — SUBJECTIVE & OBJECTIVE
Interval History: Patient reported to have fever last night, slept longer, no agitation reported. Daughter stated that a nurse had told her that she went back on oxygen because her o2 sat was low. Patient seem anxious , shallow breathing and with wheezing.    Review of Systems  Objective:     Vital Signs (Most Recent):  Temp: 99 °F (37.2 °C) (07/30/22 1144)  Pulse: 92 (07/30/22 1144)  Resp: 18 (07/30/22 1144)  BP: (!) 158/71 (07/30/22 1144)  SpO2: 97 % (07/30/22 1144)   Vital Signs (24h Range):  Temp:  [99 °F (37.2 °C)-101.8 °F (38.8 °C)] 99 °F (37.2 °C)  Pulse:  [] 92  Resp:  [16-20] 18  SpO2:  [91 %-97 %] 97 %  BP: (124-158)/(58-71) 158/71     Weight: 80.3 kg (177 lb)  Body mass index is 31.35 kg/m².  No intake or output data in the 24 hours ending 07/30/22 1550   Physical Exam  Constitutional:       General: She is not in acute distress.     Appearance: She is not ill-appearing.   HENT:      Head: Normocephalic and atraumatic.      Right Ear: External ear normal.      Left Ear: External ear normal.      Nose: Nose normal.      Mouth/Throat:      Mouth: Mucous membranes are moist.      Pharynx: Oropharynx is clear.   Eyes:      General: No scleral icterus.     Extraocular Movements: Extraocular movements intact.      Conjunctiva/sclera: Conjunctivae normal.   Cardiovascular:      Rate and Rhythm: Normal rate and regular rhythm.      Heart sounds: No murmur heard.  Pulmonary:      Effort: Pulmonary effort is normal.      Breath sounds: Wheezing present. No rales.   Abdominal:      General: Abdomen is flat. Bowel sounds are normal. There is no distension.      Palpations: Abdomen is soft.      Tenderness: There is no abdominal tenderness.   Musculoskeletal:         General: No swelling. Normal range of motion.      Cervical back: Normal range of motion.      Right lower leg: Right lower leg edema: hemotoma present, not recent.   Skin:     General: Skin is warm and dry.   Neurological:      General: No focal  deficit present.      Mental Status: She is alert.   Psychiatric:      Comments: Patient redirectable, letargic and slurred speech       Significant Labs: All pertinent labs within the past 24 hours have been reviewed.  Recent Lab Results         07/30/22  1310   07/30/22  0306   07/30/22  0001        Procalcitonin 0.02  Comment: A concentration < 0.25 ng/mL represents a low risk of bacterial   infection.  Procalcitonin may not be accurate among patients with localized   infection, recent trauma or major surgery, immunosuppressed state,   invasive fungal infection, renal dysfunction. Decisions regarding   initiation or continuation of antibiotic therapy should not be based   solely on procalcitonin levels.             Albumin   2.8         Alkaline Phosphatase   76         ALT   14         Anion Gap   7         AST   12         Baso #   0.04         Basophil %   0.6         BILIRUBIN TOTAL   0.4  Comment: For infants and newborns, interpretation of results should be based  on gestational age, weight and in agreement with clinical  observations.    Premature Infant recommended reference ranges:  Up to 24 hours.............<8.0 mg/dL  Up to 48 hours............<12.0 mg/dL  3-5 days..................<15.0 mg/dL  6-29 days.................<15.0 mg/dL           BUN   11         Calcium   8.9         Chloride   104         CO2   29         Creatinine   0.6         Differential Method   Automated         eGFR if    >60.0         eGFR if non    >60.0  Comment: Calculation used to obtain the estimated glomerular filtration  rate (eGFR) is the CKD-EPI equation.            Eos #   0.2         Eosinophil %   2.8         Glucose   157         Gran # (ANC)   4.8         Gran %   73.8         Hematocrit   36.5         Hemoglobin   11.1         Immature Grans (Abs)   0.02  Comment: Mild elevation in immature granulocytes is non specific and   can be seen in a variety of conditions including stress  response,   acute inflammation, trauma and pregnancy. Correlation with other   laboratory and clinical findings is essential.           Immature Granulocytes   0.3         Lymph #   0.8         Lymph %   12.9         Magnesium   2.1         MCH   27.6         MCHC   30.4         MCV   91         Mono #   0.6         Mono %   9.6         MPV   9.8         nRBC   0         Phosphorus   3.0         Platelets   173         Potassium   4.1         PROTEIN TOTAL   5.8         RBC   4.02         RDW   13.4         SARS-CoV-2 RNA, Amplification, Qual     Negative  Comment: This test utilizes isothermal nucleic acid amplification   technology to detect the SARS-CoV-2 RdRp nucleic acid segment.   The analytical sensitivity (limit of detection) is 125 genome   equivalents/mL.     A POSITIVE result implies infection with the SARS-CoV-2 virus;  the patient is presumed to be contagious.    A NEGATIVE result means that SARS-CoV-2 nucleic acids are not  present above the limit of detection. A NEGATIVE result should be   treated as presumptive. It does not rule out the possibility of   COVID-19 and should not be the sole basis for treatment decisions.   If COVID-19 is strongly suspected based on clinical and exposure   history, re-testing using an alternate molecular assay should be   considered.       This test is only for use under the Food and Drug   Administration s Emergency Use Authorization (EUA).   Commercial kits are provided by [employer].   Performance characteristics of the EUA have been independently  verified by [facility] Department of  Pathology and Laboratory Medicine.   _________________________________________________________________  The [employer] NOW COVID-19 Letter of Authorization, along with the   authorized Fact Sheet for Healthcare Providers, the authorized Fact  Sheet for Patients, and authorized labeling are available on the FDA    website:  www.fda.gov/MedicalDevices/Safety/EmergencySituations/spd403196.htm         Sodium   140         WBC   6.45                 Significant Imaging: I have reviewed all pertinent imaging results/findings within the past 24 hours.

## 2022-07-30 NOTE — PROGRESS NOTES
"PSYCHIATRIC CONSULT NOTE    Name: Carmina Arcos  Age: 89 y.o. 5/6/1931  Date of Encounter: 7/24/2022    Sources of Information:  Patient, daughter    Chief Complaint:  Suzie, Psychosis    History of Present Illness  Carmina Arcos is a 89 y.o. female with a past psychiatric history of substance-induced psychotic disorder who [place] [facility] [place]. Psychiatry was consulted for "suzie x several days, multiple ED visits".    Chart reviewed. Patient did not receive her scheduled Risperdal yesterday evening. No psychiatric PRNs in the past 24 hours. On assessment this am, pt calm but continuing to voice hyper Faith delusions. Pt endorses continued "visions". Daughter at bedside stating that she slept last night, however continues to be somewhat agitated. Pt oriented to person, state, city, president, past president. Conversant this morning, but limited ability to follow conversation appropriately. Denies SI, HI, AVH. No somatic complaints, no other complaints during interview.     Medical History  Past Medical History:   Diagnosis Date    Anxiety     Breast cancer 2000    right (lumpectomy and radiation)    Broken bones     Colon cancer     COPD (chronic obstructive pulmonary disease)     Depression     depression (no meds now)    Hypertension     Manic episode without psychotic symptoms, unspecified     due to stimulant diet pilss, resolved       Surgical History  Past Surgical History:   Procedure Laterality Date    APPENDECTOMY      BREAST LUMPECTOMY Right     colon operation       COLON SURGERY      HYSTERECTOMY      Pt states still have ovaries     JOINT REPLACEMENT      TONSILLECTOMY         Current Medications  UNABLE TO FIND, albuterol, calcium carbonate/vitamin D3, diclofenac sodium, famotidine, glycopyrrolate-formoteroL, hydroCHLOROthiazide, levothyroxine, losartan, magnesium oxide, and potassium    Allergies: Penicillins     Medical Review of Systems  Pertinent items " "are noted in HPI.    PHYSICAL EXAM:  Vitals: Blood pressure (!) 140/66, pulse 100, temperature 98.2 °F (36.8 °C), temperature source Axillary, resp. rate 16, height 5' 3" (1.6 m), weight 80.3 kg (177 lb), SpO2 100 %, not currently breastfeeding.    Labs & Imaging    Results for orders placed or performed during the hospital encounter of 07/24/22   EKG 12-lead    Collection Time: 07/24/22  5:44 PM    Narrative    Test Reason : Z91.89,    Vent. Rate : 100 BPM     Atrial Rate : 100 BPM     P-R Int : 202 ms          QRS Dur : 118 ms      QT Int : 368 ms       P-R-T Axes : 082 -58 066 degrees     QTc Int : 474 ms    Sinus rhythm  Left axis deviation  Possible  Anterior infarct ,age undetermined  Abnormal ECG  No previous ECGs available  Confirmed by Pasquale BATISTA MD ([id number]) on 7/25/2022 9:02:23 AM    Referred By: [email or web address]   SELF           Confirmed By:Pasquale BATISTA MD       Sodium (mmol/L)   Date Value   07/30/2022 140     Potassium (mmol/L)   Date Value   07/30/2022 4.1     Calcium (mg/dL)   Date Value   07/30/2022 8.9     Phosphorus (mg/dL)   Date Value   07/30/2022 3.0     Magnesium (mg/dL)   Date Value   07/30/2022 2.1     CO2 (mmol/L)   Date Value   07/30/2022 29       Glucose (mg/dL)   Date Value   07/30/2022 157 (H)       Creatinine (mg/dL)   Date Value   07/30/2022 0.6     BUN (mg/dL)   Date Value   07/30/2022 11     eGFR if African American (mL/min/1.73 m^2)   Date Value   07/30/2022 >60.0     eGFR if non African American (mL/min/1.73 m^2)   Date Value   07/30/2022 >60.0     Specific Points, UA (no units)   Date Value   07/24/2022 1.020     Protein, UA (no units)   Date Value   07/24/2022 Negative       AST (U/L)   Date Value   07/30/2022 12     ALT (U/L)   Date Value   07/30/2022 14     Total Bilirubin (mg/dL)   Date Value   07/30/2022 0.4     Albumin (g/dL)   Date Value   07/30/2022 2.8 (L)       No results found for: AMYLASE, LIPASE    WBC (K/uL)   Date Value   07/30/2022 6.45     Gran # (ANC) (K/uL)   Date Value "   07/30/2022 4.8     Gran % (%)   Date Value   07/30/2022 73.8 (H)     RBC (M/uL)   Date Value   07/30/2022 4.02     MCV (fL)   Date Value   07/30/2022 91     Platelets (K/uL)   Date Value   07/30/2022 173       TSH (uIU/mL)   Date Value   07/24/2022 1.547     Alcohol, Serum (mg/dL)   Date Value   07/24/2022 <10   07/22/2022 <10       Benzodiazepines (no units)   Date Value   07/24/2022 Negative   07/22/2022 Negative     Barbiturate Screen, Ur (no units)   Date Value   07/24/2022 Negative   07/22/2022 Negative     Methadone metabolites (no units)   Date Value   07/24/2022 Negative   07/22/2022 Negative     Opiate Scrn, Ur (no units)   Date Value   07/24/2022 Negative   07/22/2022 Negative     Cocaine (Metab.) (no units)   Date Value   07/24/2022 Negative   07/22/2022 Negative     Amphetamine Screen, Ur (no units)   Date Value   07/24/2022 Negative   07/22/2022 Negative     THC (no units)   Date Value   07/24/2022 Negative   07/22/2022 Negative     Phencyclidine (no units)   Date Value   07/24/2022 Negative   07/22/2022 Negative       MENTAL STATUS EXAM  Appearance: age appropriate, well nourished, casually dressed, lying in bed  Behavior/Cooperation: normal, cooperative  Speech: normal tone, normal rate, normal pitch, normal volume  Mood: fine  Affect: expansive  Thought Process: loose associations  Thought Content: delusions: yes, grandiose and hyperreligious. No SI/HI/AVH.    Orientation: person, place, situation  Memory: Recent and remote intact  Attention Span/Concentration: Impaired but improving  Insight: fair  Judgment: limited    SUMMARY  Carmina Arcos is a 89 y.o. female with a past psychiatric history of SIPD, who [place] [facility] [place]. Her symptoms of insomnia, hyperactivity, impulsivity, and delusions are likely 2/2 recent course of steroids. However, these symptoms are endangering her and have been difficult for her family to manage, especially due to the delay in seeing an  outpatient provider. Pt would benefit from hospital admission to psychiatrically stabilize, reinstate sleep-wake cycle, rule out medical etiologies of current symptoms, and stay safe while substances continue to wash out.    DIAGNOSTIC IMPRESSION   Substance-induced psychotic disorder  R/o bipolar disorder    PLAN  1. Changed Depakote sprinkles 125 mg Q12H to 125 mg BID to avoid sleep disturbance  2. Continue Risperdal 1 mg PO HS for sujata  3. Discontinued Famotidine as it is deliriogenic  4. Can give Zyprexa 1.25 mg IM if refusing PO Risperdal    Psychiatry will continue to follow. Thank you for the consult.    Case seen and discussed with Dr. Nena Lane.     Juana Chamorro DO  [facility], PGY-II    Today's encounter took a total time of 60 minutes, and that time included patient interview, documentation, ordering medication.    Note completed by: Juana Chamorro DO, 7/30/2022 11:16 PM

## 2022-07-30 NOTE — PROGRESS NOTES
"[facility] Medicine  Progress Note    Patient Name: Carmina Arcos  MRN: [id number]  Patient Class: OP- Observation   Admission Date: 7/24/2022  Length of Stay: 0 days  Attending Physician: John Nguyen MD  Primary Care Provider: Dick Graham MD        Subjective:     Principal Problem:Acute psychosis        HPI:  History provided mostly by daughter Nimco. Ms Grewal is a 89 yo that  according to daughter after having a 3-4 weeks ago COPD exacerbation, was started on Prednisone by her son who is a nurse. States  that after a few days she started noticing behavioral changes described as flight of ideas, insomnia, rapid speech, alternating irritability with excitability, grandiose delusions auditory hallucinations, fixation with allegories, numerology and Jain.  Daughter also affirms, reckless spending. She was [facility] to [facility] but there was no psych service available. [place] [facility] [place].  States previous episode precipitated by "diet pills " in 1999 and had to be committed to [id number] for a week. There she was started on risperidone, its unclear for how long she was on it. Daughter states that the patient also has a history of depression with ne suicide attempt.    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 She currently was living on her own and managed rental property, her family lives close to her home and visit regularly.    PMH: Breast Cancer? Colon Cancer. HTN treated with losartan hydrochlorothiazide and amlodipine, Hypothyroidism treated with levothyroxine, COPD treated with with formoterol-glycopyrrolate , albuterol PRN and Sildenafil given by his son.    Surgical hx positive for bilateral knee replacement, tonsillectomy, hysterectomy, right lumpectomy and colon surgery .    Allergies: Penicillin      Overview/Hospital Course:  Admitted to hospital medicine for " concerns of drug induced delirium vs sujata. Psychiatry consulted and recommended Risperdal. Patient still agitated the first night on Risperidal,      Interval History: Patient reported to have fever last night, slept longer, no agitation reported. Daughter stated that a nurse had told her that she went back on oxygen because her o2 sat was low. Patient seem anxious , shallow breathing and with wheezing.    Review of Systems  Objective:     Vital Signs (Most Recent):  Temp: 99 °F (37.2 °C) (07/30/22 1144)  Pulse: 92 (07/30/22 1144)  Resp: 18 (07/30/22 1144)  BP: (!) 158/71 (07/30/22 1144)  SpO2: 97 % (07/30/22 1144)   Vital Signs (24h Range):  Temp:  [99 °F (37.2 °C)-101.8 °F (38.8 °C)] 99 °F (37.2 °C)  Pulse:  [] 92  Resp:  [16-20] 18  SpO2:  [91 %-97 %] 97 %  BP: (124-158)/(58-71) 158/71     Weight: 80.3 kg (177 lb)  Body mass index is 31.35 kg/m².  No intake or output data in the 24 hours ending 07/30/22 1550   Physical Exam  Constitutional:       General: She is not in acute distress.     Appearance: She is not ill-appearing.   HENT:      Head: Normocephalic and atraumatic.      Right Ear: External ear normal.      Left Ear: External ear normal.      Nose: Nose normal.      Mouth/Throat:      Mouth: Mucous membranes are moist.      Pharynx: Oropharynx is clear.   Eyes:      General: No scleral icterus.     Extraocular Movements: Extraocular movements intact.      Conjunctiva/sclera: Conjunctivae normal.   Cardiovascular:      Rate and Rhythm: Normal rate and regular rhythm.      Heart sounds: No murmur heard.  Pulmonary:      Effort: Pulmonary effort is normal.      Breath sounds: Wheezing present. No rales.   Abdominal:      General: Abdomen is flat. Bowel sounds are normal. There is no distension.      Palpations: Abdomen is soft.      Tenderness: There is no abdominal tenderness.   Musculoskeletal:         General: No swelling. Normal range of motion.      Cervical back: Normal range of motion.       Right lower leg: Right lower leg edema: hemotoma present, not recent.   Skin:     General: Skin is warm and dry.   Neurological:      General: No focal deficit present.      Mental Status: She is alert.   Psychiatric:      Comments: Patient redirectable, letargic and slurred speech       Significant Labs: All pertinent labs within the past 24 hours have been reviewed.  Recent Lab Results         07/30/22  1310   07/30/22  0306   07/30/22  0001        Procalcitonin 0.02  Comment: A concentration < 0.25 ng/mL represents a low risk of bacterial   infection.  Procalcitonin may not be accurate among patients with localized   infection, recent trauma or major surgery, immunosuppressed state,   invasive fungal infection, renal dysfunction. Decisions regarding   initiation or continuation of antibiotic therapy should not be based   solely on procalcitonin levels.             Albumin   2.8         Alkaline Phosphatase   76         ALT   14         Anion Gap   7         AST   12         Baso #   0.04         Basophil %   0.6         BILIRUBIN TOTAL   0.4  Comment: For infants and newborns, interpretation of results should be based  on gestational age, weight and in agreement with clinical  observations.    Premature Infant recommended reference ranges:  Up to 24 hours.............<8.0 mg/dL  Up to 48 hours............<12.0 mg/dL  3-5 days..................<15.0 mg/dL  6-29 days.................<15.0 mg/dL           BUN   11         Calcium   8.9         Chloride   104         CO2   29         Creatinine   0.6         Differential Method   Automated         eGFR if    >60.0         eGFR if non    >60.0  Comment: Calculation used to obtain the estimated glomerular filtration  rate (eGFR) is the CKD-EPI equation.            Eos #   0.2         Eosinophil %   2.8         Glucose   157         Gran # (ANC)   4.8         Gran %   73.8         Hematocrit   36.5         Hemoglobin   11.1          Immature Grans (Abs)   0.02  Comment: Mild elevation in immature granulocytes is non specific and   can be seen in a variety of conditions including stress response,   acute inflammation, trauma and pregnancy. Correlation with other   laboratory and clinical findings is essential.           Immature Granulocytes   0.3         Lymph #   0.8         Lymph %   12.9         Magnesium   2.1         MCH   27.6         MCHC   30.4         MCV   91         Mono #   0.6         Mono %   9.6         MPV   9.8         nRBC   0         Phosphorus   3.0         Platelets   173         Potassium   4.1         PROTEIN TOTAL   5.8         RBC   4.02         RDW   13.4         SARS-CoV-2 RNA, Amplification, Qual     Negative  Comment: This test utilizes isothermal nucleic acid amplification   technology to detect the SARS-CoV-2 RdRp nucleic acid segment.   The analytical sensitivity (limit of detection) is 125 genome   equivalents/mL.     A POSITIVE result implies infection with the SARS-CoV-2 virus;  the patient is presumed to be contagious.    A NEGATIVE result means that SARS-CoV-2 nucleic acids are not  present above the limit of detection. A NEGATIVE result should be   treated as presumptive. It does not rule out the possibility of   COVID-19 and should not be the sole basis for treatment decisions.   If COVID-19 is strongly suspected based on clinical and exposure   history, re-testing using an alternate molecular assay should be   considered.       This test is only for use under the Food and Drug   Administration s Emergency Use Authorization (EUA).   Commercial kits are provided by [employer].   Performance characteristics of the EUA have been independently  verified by [facility] Department of  Pathology and Laboratory Medicine.   _________________________________________________________________  The ID NOW COVID-19 Letter of Authorization, along with the   authorized Fact Sheet for Healthcare  Providers, the authorized Fact  Sheet for Patients, and authorized labeling are available on the FDA   website:  www.fda.gov/MedicalDevices/Safety/EmergencySituations/qsf901675.htm         Sodium   140         WBC   6.45                 Significant Imaging: I have reviewed all pertinent imaging results/findings within the past 24 hours.      Assessment/Plan:      * Acute psychosis  Ms. Arcos is a 89 yo that  according to daughter after having a 3-4 weeks ago COPD exacerbation, was started on Prednisone by her son who is a nurse. States  that after a few days she started noticing behavioral changes described as flight of ideas, insomnia, rapid speech, alternating irritability with excitability, grandiose delusions auditory hallucinations, fixation with allegories, numerology and Anglican.     Differential includes but not limited to drug induced delirium like from recent steroids use vs manic episode from mood disorder. Unlikely to be 2/2 to infection given normal WBC and lacks of infectious symptoms. UA is also negative. UDS is also negative. TSH is normal, low likely moran for thyroid condition     - Continue to follow Psych recs   - Continue to monitor mood and mental status   - Delirium precautions   - Holding further steroids       COPD, severity to be determined  - wheezing present today   - Duo Neb refused by patien  -Avoid Steroid nebulizer.  -patient has refused nebulization, agrees to do them today      Mood disorder due to a general medical condition  See drug induced delirium     Sensorineural hearing loss (SNHL) of both ears        Hypertension  Home regimen: amlodipine 2.5 mg QD, HCTZ 25 mg QD, Losartan 100 mg QD     Plan: Blood pressure dropped overnight, home meds held except for losartan    Hx of breast cancer  S/p right lumpectomy and radiation       VTE Risk Mitigation (From admission, onward)         Ordered     enoxaparin injection 40 mg  Daily         07/24/22 1332     IP VTE HIGH RISK PATIENT   Once         07/24/22 1332     Place sequential compression device  Until discontinued         07/24/22 1332                Discharge Planning   ANA: 7/31/2022     Code Status: Full Code   Is the patient medically ready for discharge?: No    Reason for patient still in hospital (select all that apply): Patient trending condition     Discharge Plan A: Home with family                  Gadiel Grajeda MD  Department of Hospital Medicine   [facility]

## 2022-07-30 NOTE — PLAN OF CARE
Bedside swallow assessment completed. Recommend mechanical soft diet with thin liquids, as pt reports she prefers softer foods due to scattered dentition. Order requested from team. SLP to f/u to ensure tolerance. Rachel Luis CCC-SLP 7/30/2022 10:18 AM

## 2022-07-30 NOTE — ASSESSMENT & PLAN NOTE
- wheezing present today   - Duo Neb refused by patien  -Avoid Steroid nebulizer.  -patient has refused nebulization, agrees to do them today

## 2022-07-31 LAB
ALLENS TEST: ABNORMAL
DELSYS: ABNORMAL
EP: 5
ERYTHROCYTE [SEDIMENTATION RATE] IN BLOOD BY WESTERGREN METHOD: 12 MM/H
ERYTHROCYTE [SEDIMENTATION RATE] IN BLOOD BY WESTERGREN METHOD: 24 MM/H
FIO2: 32
FIO2: 35
FLOW: 3
HCO3 UR-SCNC: 40.8 MMOL/L (ref 24–28)
HCO3 UR-SCNC: 41.6 MMOL/L (ref 24–28)
HCO3 UR-SCNC: 41.9 MMOL/L (ref 24–28)
IP: 20
MIN VOL: 8.7
MODE: ABNORMAL
MODE: ABNORMAL
PCO2 BLDA: 68 MMHG (ref 35–45)
PCO2 BLDA: 81 MMHG (ref 35–45)
PCO2 BLDA: 94 MMHG (ref 35–45)
PH SMN: 7.25 [PH] (ref 7.35–7.45)
PH SMN: 7.32 [PH] (ref 7.35–7.45)
PH SMN: 7.39 [PH] (ref 7.35–7.45)
PO2 BLDA: 28 MMHG (ref 40–60)
PO2 BLDA: 75 MMHG (ref 80–100)
PO2 BLDA: 83 MMHG (ref 80–100)
POC BE: 14 MMOL/L
POC BE: 16 MMOL/L
POC BE: 16 MMOL/L
POC PCO2 TEMP: 98.4 MMHG
POC PH TEMP: 7.24
POC PO2 TEMP: 88 MMHG
POC SATURATED O2: 48 % (ref 95–100)
POC SATURATED O2: 93 % (ref 95–100)
POC SATURATED O2: 93 % (ref 95–100)
POC TCO2: 43 MMOL/L (ref 24–29)
POC TCO2: 44 MMOL/L (ref 23–27)
POC TCO2: 44 MMOL/L (ref 23–27)
POC TEMPERATURE: ABNORMAL
SAMPLE: ABNORMAL
SITE: ABNORMAL

## 2022-07-31 PROCEDURE — 87040 BLOOD CULTURE FOR BACTERIA: CPT | Mod: 59 | Performed by: STUDENT IN AN ORGANIZED HEALTH CARE EDUCATION/TRAINING PROGRAM

## 2022-07-31 PROCEDURE — 27000221 HC OXYGEN, UP TO 24 HOURS

## 2022-07-31 PROCEDURE — 94660 CPAP INITIATION&MGMT: CPT

## 2022-07-31 PROCEDURE — 25000242 PHARM REV CODE 250 ALT 637 W/ HCPCS: Performed by: STUDENT IN AN ORGANIZED HEALTH CARE EDUCATION/TRAINING PROGRAM

## 2022-07-31 PROCEDURE — 25000003 PHARM REV CODE 250: Performed by: STUDENT IN AN ORGANIZED HEALTH CARE EDUCATION/TRAINING PROGRAM

## 2022-07-31 PROCEDURE — 99900035 HC TECH TIME PER 15 MIN (STAT)

## 2022-07-31 PROCEDURE — 99226 PR SUBSEQUENT OBSERVATION CARE,LEVEL III: CPT | Mod: GC,,, | Performed by: STUDENT IN AN ORGANIZED HEALTH CARE EDUCATION/TRAINING PROGRAM

## 2022-07-31 PROCEDURE — G0378 HOSPITAL OBSERVATION PER HR: HCPCS

## 2022-07-31 PROCEDURE — 36415 COLL VENOUS BLD VENIPUNCTURE: CPT | Performed by: STUDENT IN AN ORGANIZED HEALTH CARE EDUCATION/TRAINING PROGRAM

## 2022-07-31 PROCEDURE — 99226 PR SUBSEQUENT OBSERVATION CARE,LEVEL III: ICD-10-PCS | Mod: ,,, | Performed by: PSYCHIATRY & NEUROLOGY

## 2022-07-31 PROCEDURE — 94640 AIRWAY INHALATION TREATMENT: CPT | Mod: XB

## 2022-07-31 PROCEDURE — 63600175 PHARM REV CODE 636 W HCPCS: Performed by: STUDENT IN AN ORGANIZED HEALTH CARE EDUCATION/TRAINING PROGRAM

## 2022-07-31 PROCEDURE — 27000190 HC CPAP FULL FACE MASK W/VALVE

## 2022-07-31 PROCEDURE — 25000003 PHARM REV CODE 250: Performed by: HOSPITALIST

## 2022-07-31 PROCEDURE — 99226 PR SUBSEQUENT OBSERVATION CARE,LEVEL III: ICD-10-PCS | Mod: GC,,, | Performed by: STUDENT IN AN ORGANIZED HEALTH CARE EDUCATION/TRAINING PROGRAM

## 2022-07-31 PROCEDURE — 99226 PR SUBSEQUENT OBSERVATION CARE,LEVEL III: CPT | Mod: ,,, | Performed by: PSYCHIATRY & NEUROLOGY

## 2022-07-31 PROCEDURE — 96372 THER/PROPH/DIAG INJ SC/IM: CPT | Performed by: STUDENT IN AN ORGANIZED HEALTH CARE EDUCATION/TRAINING PROGRAM

## 2022-07-31 PROCEDURE — 94761 N-INVAS EAR/PLS OXIMETRY MLT: CPT

## 2022-07-31 PROCEDURE — 94799 UNLISTED PULMONARY SVC/PX: CPT

## 2022-07-31 PROCEDURE — 82803 BLOOD GASES ANY COMBINATION: CPT

## 2022-07-31 PROCEDURE — 36600 WITHDRAWAL OF ARTERIAL BLOOD: CPT

## 2022-07-31 RX ORDER — IPRATROPIUM BROMIDE AND ALBUTEROL SULFATE 2.5; .5 MG/3ML; MG/3ML
3 SOLUTION RESPIRATORY (INHALATION)
Status: DISCONTINUED | OUTPATIENT
Start: 2022-07-31 | End: 2022-08-01

## 2022-07-31 RX ADMIN — ENOXAPARIN SODIUM 40 MG: 40 INJECTION SUBCUTANEOUS at 05:07

## 2022-07-31 RX ADMIN — HYPROMELLOSE 2910 1 DROP: 5 SOLUTION OPHTHALMIC at 02:07

## 2022-07-31 RX ADMIN — IPRATROPIUM BROMIDE AND ALBUTEROL SULFATE 3 ML: .5; 3 SOLUTION RESPIRATORY (INHALATION) at 02:07

## 2022-07-31 RX ADMIN — LOSARTAN POTASSIUM 100 MG: 50 TABLET, FILM COATED ORAL at 08:07

## 2022-07-31 RX ADMIN — IPRATROPIUM BROMIDE AND ALBUTEROL SULFATE 3 ML: .5; 3 SOLUTION RESPIRATORY (INHALATION) at 07:07

## 2022-07-31 RX ADMIN — HYPROMELLOSE 2910 1 DROP: 5 SOLUTION OPHTHALMIC at 10:07

## 2022-07-31 RX ADMIN — MICONAZOLE NITRATE: 20 CREAM TOPICAL at 10:07

## 2022-07-31 NOTE — PROGRESS NOTES
"[facility]  Psychiatry  Progress Note    Patient Name: Carmina Arcos  MRN: [id number]   Code Status: Full Code  Admission Date: 7/24/2022  Hospital Length of Stay: 0 days  Expected Discharge Date: 7/31/2022  Attending Physician: John Nguyen MD  Primary Care Provider: Dick Graham MD    Current Legal Status: Uncontested    Patient information was obtained from patient, relative(s) and ER records.       Subjective:     Patient is a 89 y.o., female, presents with:    Principal Problem:Acute psychosis    Chief Complaint: Psychosis, Sujata    HPI: Carmina Arcos is a 89 y.o. female with a past psychiatric history of substance-induced psychotic disorder who [place] [facility] due to Manic behavior. Psychiatry was consulted for "sujata x several days, multiple ED visits".    Per Primary Team:  89 yo W with pmhx COPD, anxiety, depression, HTN, breast CA presents with family for psych evaluation.  History is assisted by the patient and the patient's daughter.  They note that patient was having coughing flares of COPD 3 weeks ago and she was started on course of prednisone by a family member.  She was on the prednisone for almost 2 weeks.  The prednisone has since discontinued that she continues to have manic symptoms including insomnia, reckless spending behavior, typical references Septra.  She was seen in the emergency department several days ago and discharged home with close observation by family members and outpatient psychiatry follow-up.  That follow-up is in 6 days.  Family states that she will need a sooner psychiatric evaluation.  She did have history of manic episode many years ago in the setting of taking a diet pill.  She was admitted inpatient time.  The patient does report some foul-smelling odor in her urine and is concerned that she has a kidney infection.  Otherwise she is essentially without complaints.     The history is provided by the patient and a relative. No  " "was used.     Per Psychiatry:  Patient was seen in the ED with daughter at bedside; pt and daughter provided history together. Pt was alert, oriented x4, rapid pressured interruptive speech, euthymic affect. They report about a week of psychiatric symptoms including excitability/irritability, anxiety, insomnia, hyperactivity, visual hallucinations, hyperreligious and grandiose delusions. Pt has been preoccupied with numerology and believes her family is part of a biblical story and that she has been in the [facility] with Lázaro. She has also been impulsively shopping and spending large amounts of money. She has intermittent confusion, FOI, BALA on exam.    Pt has a history of COPD and started a tapered 2-wk course of PO Prednisone for a presumed exacerbation, which she got from her son (a nurse and RT). Before the course was completed, the pt's psychiatric symptoms started as described above. Family was concerned that the Prednisone may be causing her symptoms but they could not convince her to stop taking it. They presented to the ED several times since then and were initially comfortable with outpatient follow-up with pt's former psychiatrist, Dr. Ceballos, on Friday 7/29. However, they have become increasingly concerned with pt's symptoms and feel she may be a danger to herself.     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 She speaks several languages and has no known history of cognitive decline. Her psychiatric history includes one substance-induced psychotic episode in 1999. She took some stimulant diet pills, developed insomnia and visual hallucinations, and was admitted to [facility]. She was started on Risperdal and Zoloft, which she continued to see Dr. Ceballos for for some time. Eventually her symptoms resolved and she went back to her "relatively high-strung" baseline, per daughter. She has been out of Dr. Ceballos's care and not " "having psychiatric issues for the past 10+ years. Pt's daughter also reports a remote suicide attempt over 30 yrs ago, via Benadryl overdose.    Prior to the course of Prednisone, pt was at baseline with none of her current symptoms. She last took Prednisone on Wednesday 7/20 but the symptoms have persisted. Pt and family are amenable to inpt hospitalization for her current symptoms.    Psychiatric Review of Systems-is patient experiencing or having changes in  sleep: yes, insomnia  appetite: no  weight: no  energy/anergy: yes, hyperactive  interest/pleasure/anhedonia: no  somatic symptoms: no  libido: no  anxiety/panic: yes  guilty/hopelessness: no  concentration: yes  S.I.B.s/risky behavior: yes, impulsive shopping  any drugs: no  alcohol: no     Allergies:  Penicillins      Hospital Course:   Initiated Seroquel 50 mg PO QHS overnight on 7/25, but had poor sleep, frequently getting up from bed and becoming agitated regarding delusional thought content (per daughter and nursing). Labile affect with hyperreligious thoughts. Disoriented to time, place. Seroquel was siwtched to risperdal 0.5mg qhs on 7/26, continued to be hyperreligious but AOx4. Intermittently agitated overnight on 7/27, with incident during which she fell at 3AM as she frequently got out of bed without assistance. Became physically threatening with staff in the AM. Continued to have minimal sleep.     7/28/22:  Overnight, pt had another fall/weakness episode requiring assistance to return to bed from bathroom. She received scheduled Risperdal 1 mg PO scheduled and 0.5 mg PRN @ 0216 for agitation. Pt reportedly did not respond to the PRN medication and continued to be elevated, irritable, and not sleeping. This morning, pt was sleeping but rousable for interview. She continued to be irritable and hyperreligious, referring to her "visions" and sleeping when God wants her to. She was oriented x3 and denied complaints other than wanting to sleep and " "being tired of getting interrupted. Interview terminated due to pt's irritability.    7/29/22:  Patient was agitated yesterday, frequently screaming, crying, and calling family members to discuss Hinduism delusions. She received PRN IM Zyprexa 2.5 mg yesterday afternoon and slept for several hours afterward. She received her first dose of QHS VPA yesterday and slept well throughout the night, per nursing. Pt was sleeping and difficult to arouse this morning but participated in brief psychiatric interview. She was oriented to time/place and endorsed good sleep overnight. Recalls recent events (daughter bringing baked goods to bedside). She reported she was still having her "visions" but said they were "allegory", not real. Interview terminated due to pt's drowsiness. Per nursing, pt was conversant/oriented this morning with son-in-law at bedside, both remarked how much calmer she was today than previous days.    7/31/22: Patient resting in bed with family at bedside, minimally interactive with eyes closed throughout most of interaction. Described the year as "2023" and when informed it was 2022, denied and stated that it was 2023. Perseverated on saying "they weren't telling the truth," and when asked who "they" were, she continuously stated "everyone." Reportedly not given risperdal and depakote d/t confusion and inability to swallow.      Mental Status Exam:  Appearance: age appropriate, well nourished, casually dressed, lying in bed  Behavior/Cooperation: cooperative  Speech: mumbled  Mood: fine  Affect: unable to assess  Thought Process: unable to assess  Thought Content: unable to assess  Orientation: person, place, situation  Memory: LISA  Attention Span/Concentration: Impaired  Insight: limited  Judgment: limited       Scheduled Medications:   albuterol-ipratropium  3 mL Nebulization Q6H WAKE    artificial tears  1 drop Both Eyes TID    enoxaparin  40 mg Subcutaneous Daily    levothyroxine  50 mcg Oral " Before breakfast    losartan  100 mg Oral Daily    miconazole   Topical (Top) BID       PRN Medications:  acetaminophen, albuterol-ipratropium, dextrose 10%, dextrose 10%, glucagon (human recombinant), glucose, glucose, naloxone, OLANZapine, risperiDONE, sodium chloride 0.9%    Review of patient's allergies indicates:   Allergen Reactions    Penicillins Hives and Shortness Of Breath       Assessment/Plan:     * Acute psychosis  ASSESSMENT     Carmina Arcos is a 89 y.o. female with a past psychiatric history of SIPD, who [place] [facility] [place]. Her symptoms of insomnia, hyperactivity, impulsivity, and delusions are likely 2/2 recent course of steroids. However, these symptoms are endangering her and have been difficult for her family to manage, especially due to the delay in seeing an outpatient provider. Pt would benefit from hospital admission to psychiatrically stabilize, reinstate sleep-wake cycle, rule out medical etiologies of current symptoms, and stay safe while substances continue to wash out.    IMPRESSION  Substance-induced psychotic disorder  R/o bipolar disorder    RECOMMENDATION(S)      1. Scheduled Medication(s):  - Change Depakote 125mg PO BID to Depakote 125mg IV   - switch Risperdal formulation to m-tabs (disintegrating) 1 mg PO QHS for acute sujata  - Can give Zyprexa 1.25 mg IM if refusing PO Risperdal  - Agree with starting Melatonin 9 mg PO QHS  - Primary team discussed starting Trazodone - we feel that the risks of orthostatic hypotension outweigh the potential benefit, which is likely minimal in this acutely manic patient    2. PRN Medication(s):  - Continue Risperdal 0.5 mg PO q8hrs PRN non-redirectable agitation  - Recommend changing PRN Zyprexa 2.5 mg PO or IM to 1.25 mg q8hrs PRN  - Nursing seems unaware of PRN IM option per conversation this morning - would emphasize this option is available to nursing staff    3.  Monitor:  - Please obtain daily EKG to monitor  QTc    4. Legal Status/Precaution(s):  - Patient does not meet criteria for PEC at this time. Patient does appear gravely disabled due to a psychiatric illness but is help-seeking and not contesting her admission. She may not have full capacity to refuse meds at this time given her altered mentation.  - Recommend fall precautions and strict bed rest with alarm on, low bed height, to avoid falls due to potential orthostatic hypotension secondary to Risperdal in this elderly patient.           Wei Corona MD   Psychiatry  [facility] - Med Surg

## 2022-07-31 NOTE — ASSESSMENT & PLAN NOTE
Ms. Arcos is a 89 yo that  according to daughter after having a 3-4 weeks ago COPD exacerbation, was started on Prednisone by her son who is a nurse. States  that after a few days she started noticing behavioral changes described as flight of ideas, insomnia, rapid speech, alternating irritability with excitability, grandiose delusions auditory hallucinations, fixation with allegories, numerology and Anabaptism.     Differential includes but not limited to drug induced delirium like from recent steroids use vs manic episode from mood disorder. Unlikely to be 2/2 to infection given normal WBC and lacks of infectious symptoms. UA is also negative. UDS is also negative. TSH is normal, low likely moran for thyroid condition     - Continue to follow Psych recs   - Continue to monitor mood and mental status   - Delirium precautions   - Holding further steroids

## 2022-07-31 NOTE — SUBJECTIVE & OBJECTIVE
Interval History: No acute event overnight. Pt remains afebrile overnight with no other acute vital signs. Was somnolent but would respond with verbal commands. Continues to require oxygen at 2 L NC. CTX was given for one time yesterday give hypoxia. However, d/c due to normal procalcitonin.       Review of Systems   Constitutional:  Negative for chills and fever.   HENT:  Negative for rhinorrhea, sinus pain and sore throat.    Respiratory:  Negative for cough and chest tightness.    Cardiovascular:  Negative for chest pain.   Gastrointestinal:  Negative for abdominal distention, abdominal pain, constipation and diarrhea.   Endocrine: Negative for cold intolerance.   Genitourinary:  Negative for difficulty urinating.   Musculoskeletal: Negative.    Skin: Negative.    Neurological:  Negative for seizures and headaches.   Psychiatric/Behavioral:  Positive for confusion.    Objective:     Vital Signs (Most Recent):  Temp: 98 °F (36.7 °C) (07/31/22 1154)  Pulse: 94 (07/31/22 1200)  Resp: 16 (07/31/22 1154)  BP: 137/62 (07/31/22 1154)  SpO2: (!) 89 % (07/31/22 1200)   Vital Signs (24h Range):  Temp:  [97.9 °F (36.6 °C)-98.5 °F (36.9 °C)] 98 °F (36.7 °C)  Pulse:  [] 94  Resp:  [16-20] 16  SpO2:  [89 %-100 %] 89 %  BP: (130-141)/(57-66) 137/62     Weight: 80.3 kg (177 lb)  Body mass index is 31.35 kg/m².  No intake or output data in the 24 hours ending 07/31/22 1415   Physical Exam  Constitutional:       General: She is not in acute distress.     Appearance: She is not ill-appearing.   HENT:      Head: Normocephalic and atraumatic.      Right Ear: External ear normal.      Left Ear: External ear normal.      Nose: Nose normal.      Mouth/Throat:      Mouth: Mucous membranes are moist.      Pharynx: Oropharynx is clear.   Eyes:      General: No scleral icterus.     Extraocular Movements: Extraocular movements intact.      Conjunctiva/sclera: Conjunctivae normal.   Cardiovascular:      Rate and Rhythm: Normal rate and  regular rhythm.      Heart sounds: No murmur heard.  Pulmonary:      Effort: Pulmonary effort is normal.      Breath sounds: Wheezing present. No rales.   Abdominal:      General: Abdomen is flat. Bowel sounds are normal. There is no distension.      Palpations: Abdomen is soft.      Tenderness: There is no abdominal tenderness.   Musculoskeletal:         General: No swelling. Normal range of motion.      Cervical back: Normal range of motion.      Right lower leg: Right lower leg edema: hemotoma present, not recent.   Skin:     General: Skin is warm and dry.   Neurological:      General: No focal deficit present.      Mental Status: She is alert.   Psychiatric:      Comments: Patient redirectable, letargic and slurred speech       Significant Labs: All pertinent labs within the past 24 hours have been reviewed.  Recent Lab Results       None            Significant Imaging: I have reviewed all pertinent imaging results/findings within the past 24 hours.

## 2022-07-31 NOTE — PROGRESS NOTES
"[facility] Medicine  Progress Note    Patient Name: Carmina Arcos  MRN: [id number]  Patient Class: OP- Observation   Admission Date: 7/24/2022  Length of Stay: 0 days  Attending Physician: John Nguyen MD  Primary Care Provider: Dick Graham MD        Subjective:     Principal Problem:Acute psychosis        HPI:  History provided mostly by daughter Nimco. Ms Grewal is a 89 yo that  according to daughter after having a 3-4 weeks ago COPD exacerbation, was started on Prednisone by her son who is a nurse. States  that after a few days she started noticing behavioral changes described as flight of ideas, insomnia, rapid speech, alternating irritability with excitability, grandiose delusions auditory hallucinations, fixation with allegories, numerology and Hoahaoism.  Daughter also affirms, reckless spending. She was taken to [facility] but there was no psych service available. They decided to come to [facility] for psychiatric evaluation.  States previous episode precipitated by "diet pills " in 1999 and had to be committed [facility] a [facility]. There she was started on risperidone, its unclear for how long she was on it. Daughter states that the patient also has a history of depression with ne suicide attempt.    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 She currently was living on her own and managed rental property, her family lives close to her home and visit regularly.    PMH: Breast Cancer? Colon Cancer. HTN treated with losartan hydrochlorothiazide and amlodipine, Hypothyroidism treated with levothyroxine, COPD treated with with formoterol-glycopyrrolate , albuterol PRN and Sildenafil given by his son.    Surgical hx positive for bilateral knee replacement, tonsillectomy, hysterectomy, right lumpectomy and colon surgery .    Allergies: Penicillin      Overview/Hospital Course:  Admitted to hospital medicine for " concerns of drug induced delirium vs sujata. Psychiatry consulted and recommended Risperdal. Patient still agitated the first night on Risperidal,      Interval History: No acute event overnight. Pt remains afebrile overnight with no other acute vital signs. Was somnolent but would respond with verbal commands. Continues to require oxygen at 2 L NC. CTX was given for one time yesterday give hypoxia. However, d/c due to normal procalcitonin.       Review of Systems   Constitutional:  Negative for chills and fever.   HENT:  Negative for rhinorrhea, sinus pain and sore throat.    Respiratory:  Negative for cough and chest tightness.    Cardiovascular:  Negative for chest pain.   Gastrointestinal:  Negative for abdominal distention, abdominal pain, constipation and diarrhea.   Endocrine: Negative for cold intolerance.   Genitourinary:  Negative for difficulty urinating.   Musculoskeletal: Negative.    Skin: Negative.    Neurological:  Negative for seizures and headaches.   Psychiatric/Behavioral:  Positive for confusion.    Objective:     Vital Signs (Most Recent):  Temp: 98 °F (36.7 °C) (07/31/22 1154)  Pulse: 94 (07/31/22 1200)  Resp: 16 (07/31/22 1154)  BP: 137/62 (07/31/22 1154)  SpO2: (!) 89 % (07/31/22 1200)   Vital Signs (24h Range):  Temp:  [97.9 °F (36.6 °C)-98.5 °F (36.9 °C)] 98 °F (36.7 °C)  Pulse:  [] 94  Resp:  [16-20] 16  SpO2:  [89 %-100 %] 89 %  BP: (130-141)/(57-66) 137/62     Weight: 80.3 kg (177 lb)  Body mass index is 31.35 kg/m².  No intake or output data in the 24 hours ending 07/31/22 1415   Physical Exam  Constitutional:       General: She is not in acute distress.     Appearance: She is not ill-appearing.   HENT:      Head: Normocephalic and atraumatic.      Right Ear: External ear normal.      Left Ear: External ear normal.      Nose: Nose normal.      Mouth/Throat:      Mouth: Mucous membranes are moist.      Pharynx: Oropharynx is clear.   Eyes:      General: No scleral icterus.      Extraocular Movements: Extraocular movements intact.      Conjunctiva/sclera: Conjunctivae normal.   Cardiovascular:      Rate and Rhythm: Normal rate and regular rhythm.      Heart sounds: No murmur heard.  Pulmonary:      Effort: Pulmonary effort is normal.      Breath sounds: Wheezing present. No rales.   Abdominal:      General: Abdomen is flat. Bowel sounds are normal. There is no distension.      Palpations: Abdomen is soft.      Tenderness: There is no abdominal tenderness.   Musculoskeletal:         General: No swelling. Normal range of motion.      Cervical back: Normal range of motion.      Right lower leg: Right lower leg edema: hemotoma present, not recent.   Skin:     General: Skin is warm and dry.   Neurological:      General: No focal deficit present.      Mental Status: She is alert.   Psychiatric:      Comments: Patient redirectable, letargic and slurred speech       Significant Labs: All pertinent labs within the past 24 hours have been reviewed.  Recent Lab Results       None            Significant Imaging: I have reviewed all pertinent imaging results/findings within the past 24 hours.      Assessment/Plan:      * Acute psychosis  Ms. Arcos is a 89 yo that  according to daughter after having a 3-4 weeks ago COPD exacerbation, was started on Prednisone by her son who is a nurse. States  that after a few days she started noticing behavioral changes described as flight of ideas, insomnia, rapid speech, alternating irritability with excitability, grandiose delusions auditory hallucinations, fixation with allegories, numerology and Yazidi.     Differential includes but not limited to drug induced delirium like from recent steroids use vs manic episode from mood disorder. Unlikely to be 2/2 to infection given normal WBC and lacks of infectious symptoms. UA is also negative. UDS is also negative. TSH is normal, low likely moran for thyroid condition     - Continue to follow Psych recs   - Continue  to monitor mood and mental status   - Delirium precautions   - Holding further steroids       Hypertension  Home regimen: amlodipine 2.5 mg QD, HCTZ 25 mg QD, Losartan 100 mg QD     Plan: Blood pressure dropped overnight, home meds held except for losartan    Delirium        Manic behavior        COPD, severity to be determined  - wheezing present today   - Duo Neb scheduled q6h while awake   - Wean Oxygen with goal sat of 88%   -Avoid Steroid nebulizer.  -patient has refused nebulization, agrees to do them today      Mood disorder due to a general medical condition  See drug induced delirium     Sensorineural hearing loss (SNHL) of both ears        Hx of breast cancer  S/p right lumpectomy and radiation       VTE Risk Mitigation (From admission, onward)         Ordered     enoxaparin injection 40 mg  Daily         07/24/22 1332     IP VTE HIGH RISK PATIENT  Once         07/24/22 1332     Place sequential compression device  Until discontinued         07/24/22 1332                Discharge Planning   ANA: 7/31/2022     Code Status: Full Code   Is the patient medically ready for discharge?: No    Reason for patient still in hospital (select all that apply): Patient trending condition, Treatment and Consult recommendations  Discharge Plan A: Home with family                  Enriqueta Erazo DO  Department of Hospital Medicine   [facility]

## 2022-07-31 NOTE — CARE UPDATE
RAPID RESPONSE NURSE PROACTIVE ROUNDING NOTE       Time of Visit:     Admit Date: 2022  LOS: 0  Code Status: Full Code   Date of Visit: 2022  : 1931  Age: 89 y.o.  Sex: female  Race: White  Bed: 647/647 A:   MRN: [id number]  Was the patient discharged from an ICU this admission? No   Was the patient discharged from a PACU within last 24 hours? No   Did the patient receive conscious sedation/general anesthesia in last 24 hours? No  Was the patient in the ED within the past 24 hours? No  Was the patient on NIPPV within the past 24 hours? No   Attending Physician: John Nguyen MD  Primary Service: [facility] HOSP MED 1   Time spent at the bedside: < 15 min    SITUATION    Notified by charge RN via phone call.  Reason for alert: respiratory distress  Called to evaluate the patient for Respiratory    BACKGROUND     Why is the patient in the hospital?: Acute psychosis    Patient has a past medical history of Anxiety, Breast cancer, Broken bones, Colon cancer, COPD (chronic obstructive pulmonary disease), Depression, Hypertension, and Manic episode without psychotic symptoms, unspecified.    Last Vitals:  Temp: 100.6 °F (38.1 °C) (1749)  Pulse: 89 (1749)  Resp: 22 (1749)  BP: 115/65 (1749)  SpO2: 95 % (1749)    24 Hours Vitals Range:  Temp:  [97.9 °F (36.6 °C)-100.6 °F (38.1 °C)]   Pulse:  []   Resp:  [16-28]   BP: (114-140)/(57-68)   SpO2:  [89 %-100 %]     Labs:  Recent Labs     22  0306   WBC 6.45   HGB 11.1*   HCT 36.5*          Recent Labs     22  0306      K 4.1      CO2 29   CREATININE 0.6   *   PHOS 3.0   MG 2.1        Recent Labs     22  1641   PH 7.254*   PCO2 94.0*   PO2 83   HCO3 41.6*   POCSATURATED 93*   BE 14        ASSESSMENT    Physical Exam    INTERVENTIONS    The patient was seen for a Respiratory problem. Staff concerns included tachypnea, oxygen saturation < 90% despite supplemental oxygen and hypercapnia.  The following interventions were performed: Bipap and POCT arterial blood gas .    RECOMMENDATIONS    CCS consulted to evaluate patient for increased CO2 levels, see ABG.  Patient was placed on Bipap and will recheck ABG at 1900.    PROVIDER ESCALATION    Yes/No  no    Orders received and case discussed with Dr. Erazo.    Disposition: Remain in room 647.    FOLLOW-UP    charge Chai CALIXTO updated on plan of care. Instructed to call the Rapid Response Nurse, Kayla Garcia RN at [phone] for additional questions or concerns.

## 2022-07-31 NOTE — NURSING
Pt family refused morning dose of Depakote, stating they would rather her have it once a day at night.

## 2022-07-31 NOTE — NURSING
Pt disoriented and drowsy, unable to stay wake long enough to hold conversation or take meds. Nighttime risperidone and depakote not given. Pt slept through most of the night frequently pulling out nasal cannula.

## 2022-07-31 NOTE — NURSING
Patient more lethargic but still arousable. MD ordered ABG. PH=7.235, PCO2=94. Patient on 3 L  Nc, 95%

## 2022-07-31 NOTE — ASSESSMENT & PLAN NOTE
- wheezing present today   - Duo Neb scheduled q6h while awake   - Wean Oxygen with goal sat of 88%   -Avoid Steroid nebulizer.  -patient has refused nebulization, agrees to do them today

## 2022-07-31 NOTE — ASSESSMENT & PLAN NOTE
ASSESSMENT     Carmina Arcos is a 89 y.o. female with a past psychiatric history of SIPD, who presented to the [facility] due to manic behavior. Her symptoms of insomnia, hyperactivity, impulsivity, and delusions are likely 2/2 recent course of steroids. However, these symptoms are endangering her and have been difficult for her family to manage, especially due to the delay in seeing an outpatient provider. Pt would benefit from hospital admission to psychiatrically stabilize, reinstate sleep-wake cycle, rule out medical etiologies of current symptoms, and stay safe while substances continue to wash out.    IMPRESSION  Substance-induced psychotic disorder  R/o bipolar disorder    RECOMMENDATION(S)      1. Scheduled Medication(s):  - Change Depakote 125mg PO BID to Depakote 125mg IV   - switch Risperdal formulation to m-tabs (disintegrating) 1 mg PO QHS for acute sujata  - Can give Zyprexa 1.25 mg IM if refusing PO Risperdal  - Agree with starting Melatonin 9 mg PO QHS  - Primary team discussed starting Trazodone - we feel that the risks of orthostatic hypotension outweigh the potential benefit, which is likely minimal in this acutely manic patient    2. PRN Medication(s):  - Continue Risperdal 0.5 mg PO q8hrs PRN non-redirectable agitation  - Recommend changing PRN Zyprexa 2.5 mg PO or IM to 1.25 mg q8hrs PRN  - Nursing seems unaware of PRN IM option per conversation this morning - would emphasize this option is available to nursing staff    3.  Monitor:  - Please obtain daily EKG to monitor QTc    4. Legal Status/Precaution(s):  - Patient does not meet criteria for PEC at this time. Patient does appear gravely disabled due to a psychiatric illness but is help-seeking and not contesting her admission. She may not have full capacity to refuse meds at this time given her altered mentation.  - Recommend fall precautions and strict bed rest with alarm on, low bed height, to avoid falls due to potential orthostatic  hypotension secondary to Risperdal in this elderly patient.

## 2022-07-31 NOTE — SUBJECTIVE & OBJECTIVE
Mental Status Exam:  Appearance: age appropriate, well nourished, casually dressed, lying in bed  Behavior/Cooperation: cooperative  Speech: mumbled  Mood: fine  Affect: unable to assess  Thought Process: unable to assess  Thought Content: unable to assess  Orientation: person, place, situation  Memory: LISA  Attention Span/Concentration: Impaired  Insight: limited  Judgment: limited

## 2022-07-31 NOTE — NURSING
Pt drowsy. Minimal response to stimuli. Mumbled sounds when responding to questions. Slightly opened asked when asked to. Slightly put hand up when asked to give thumbs up. Unable to give nighttime risperidone or depakote, pt would not swallow. Pt back asleep. Will continue to monitor.

## 2022-08-01 ENCOUNTER — PATIENT MESSAGE (OUTPATIENT)
Dept: PULMONOLOGY | Facility: CLINIC | Age: 87
End: 2022-08-01
Payer: MEDICARE

## 2022-08-01 LAB
ALBUMIN SERPL BCP-MCNC: 3 G/DL (ref 3.5–5.2)
ALP SERPL-CCNC: 80 U/L (ref 55–135)
ALT SERPL W/O P-5'-P-CCNC: 12 U/L (ref 10–44)
ANION GAP SERPL CALC-SCNC: 6 MMOL/L (ref 8–16)
AST SERPL-CCNC: 12 U/L (ref 10–40)
BASOPHILS # BLD AUTO: 0.05 K/UL (ref 0–0.2)
BASOPHILS NFR BLD: 0.5 % (ref 0–1.9)
BILIRUB SERPL-MCNC: 0.6 MG/DL (ref 0.1–1)
BILIRUB UR QL STRIP: NEGATIVE
BUN SERPL-MCNC: 18 MG/DL (ref 10–30)
CALCIUM SERPL-MCNC: 9.5 MG/DL (ref 8.7–10.5)
CHLORIDE SERPL-SCNC: 101 MMOL/L (ref 95–110)
CLARITY UR REFRACT.AUTO: CLEAR
CO2 SERPL-SCNC: 35 MMOL/L (ref 23–29)
COLOR UR AUTO: YELLOW
CREAT SERPL-MCNC: 0.6 MG/DL (ref 0.5–1.4)
DIFFERENTIAL METHOD: ABNORMAL
EOSINOPHIL # BLD AUTO: 0.2 K/UL (ref 0–0.5)
EOSINOPHIL NFR BLD: 1.8 % (ref 0–8)
ERYTHROCYTE [DISTWIDTH] IN BLOOD BY AUTOMATED COUNT: 12.9 % (ref 11.5–14.5)
EST. GFR  (NO RACE VARIABLE): >60 ML/MIN/1.73 M^2
GLUCOSE SERPL-MCNC: 160 MG/DL (ref 70–110)
GLUCOSE UR QL STRIP: ABNORMAL
HCT VFR BLD AUTO: 41.4 % (ref 37–48.5)
HGB BLD-MCNC: 12.6 G/DL (ref 12–16)
HGB UR QL STRIP: NEGATIVE
IMM GRANULOCYTES # BLD AUTO: 0.03 K/UL (ref 0–0.04)
IMM GRANULOCYTES NFR BLD AUTO: 0.3 % (ref 0–0.5)
KETONES UR QL STRIP: ABNORMAL
LEUKOCYTE ESTERASE UR QL STRIP: NEGATIVE
LYMPHOCYTES # BLD AUTO: 1 K/UL (ref 1–4.8)
LYMPHOCYTES NFR BLD: 10.5 % (ref 18–48)
MAGNESIUM SERPL-MCNC: 2.3 MG/DL (ref 1.6–2.6)
MCH RBC QN AUTO: 28.5 PG (ref 27–31)
MCHC RBC AUTO-ENTMCNC: 30.4 G/DL (ref 32–36)
MCV RBC AUTO: 94 FL (ref 82–98)
MONOCYTES # BLD AUTO: 0.7 K/UL (ref 0.3–1)
MONOCYTES NFR BLD: 7.7 % (ref 4–15)
NEUTROPHILS # BLD AUTO: 7.6 K/UL (ref 1.8–7.7)
NEUTROPHILS NFR BLD: 79.2 % (ref 38–73)
NITRITE UR QL STRIP: NEGATIVE
NRBC BLD-RTO: 0 /100 WBC
PH UR STRIP: 5 [PH] (ref 5–8)
PHOSPHATE SERPL-MCNC: 2.2 MG/DL (ref 2.7–4.5)
PLATELET # BLD AUTO: 169 K/UL (ref 150–450)
PMV BLD AUTO: 10.6 FL (ref 9.2–12.9)
POTASSIUM SERPL-SCNC: 4.4 MMOL/L (ref 3.5–5.1)
PROT SERPL-MCNC: 6.4 G/DL (ref 6–8.4)
PROT UR QL STRIP: NEGATIVE
RBC # BLD AUTO: 4.42 M/UL (ref 4–5.4)
SODIUM SERPL-SCNC: 142 MMOL/L (ref 136–145)
SP GR UR STRIP: 1.02 (ref 1–1.03)
URN SPEC COLLECT METH UR: ABNORMAL
WBC # BLD AUTO: 9.62 K/UL (ref 3.9–12.7)

## 2022-08-01 PROCEDURE — 93005 ELECTROCARDIOGRAM TRACING: CPT

## 2022-08-01 PROCEDURE — 94660 CPAP INITIATION&MGMT: CPT

## 2022-08-01 PROCEDURE — 25000003 PHARM REV CODE 250: Performed by: HOSPITALIST

## 2022-08-01 PROCEDURE — 63700000 PHARM REV CODE 250 ALT 637 W/O HCPCS: Performed by: STUDENT IN AN ORGANIZED HEALTH CARE EDUCATION/TRAINING PROGRAM

## 2022-08-01 PROCEDURE — 25000003 PHARM REV CODE 250: Performed by: STUDENT IN AN ORGANIZED HEALTH CARE EDUCATION/TRAINING PROGRAM

## 2022-08-01 PROCEDURE — 94761 N-INVAS EAR/PLS OXIMETRY MLT: CPT

## 2022-08-01 PROCEDURE — 93010 ELECTROCARDIOGRAM REPORT: CPT | Mod: ,,, | Performed by: INTERNAL MEDICINE

## 2022-08-01 PROCEDURE — 84100 ASSAY OF PHOSPHORUS: CPT | Performed by: STUDENT IN AN ORGANIZED HEALTH CARE EDUCATION/TRAINING PROGRAM

## 2022-08-01 PROCEDURE — 99233 PR SUBSEQUENT HOSPITAL CARE,LEVL III: ICD-10-PCS | Mod: GC,,, | Performed by: STUDENT IN AN ORGANIZED HEALTH CARE EDUCATION/TRAINING PROGRAM

## 2022-08-01 PROCEDURE — 63600175 PHARM REV CODE 636 W HCPCS: Performed by: STUDENT IN AN ORGANIZED HEALTH CARE EDUCATION/TRAINING PROGRAM

## 2022-08-01 PROCEDURE — 25000242 PHARM REV CODE 250 ALT 637 W/ HCPCS

## 2022-08-01 PROCEDURE — 85025 COMPLETE CBC W/AUTO DIFF WBC: CPT | Performed by: STUDENT IN AN ORGANIZED HEALTH CARE EDUCATION/TRAINING PROGRAM

## 2022-08-01 PROCEDURE — 99233 SBSQ HOSP IP/OBS HIGH 50: CPT | Mod: GC,,, | Performed by: STUDENT IN AN ORGANIZED HEALTH CARE EDUCATION/TRAINING PROGRAM

## 2022-08-01 PROCEDURE — 25000003 PHARM REV CODE 250

## 2022-08-01 PROCEDURE — 11000001 HC ACUTE MED/SURG PRIVATE ROOM

## 2022-08-01 PROCEDURE — 80053 COMPREHEN METABOLIC PANEL: CPT | Performed by: STUDENT IN AN ORGANIZED HEALTH CARE EDUCATION/TRAINING PROGRAM

## 2022-08-01 PROCEDURE — 25000242 PHARM REV CODE 250 ALT 637 W/ HCPCS: Performed by: STUDENT IN AN ORGANIZED HEALTH CARE EDUCATION/TRAINING PROGRAM

## 2022-08-01 PROCEDURE — 36415 COLL VENOUS BLD VENIPUNCTURE: CPT | Performed by: STUDENT IN AN ORGANIZED HEALTH CARE EDUCATION/TRAINING PROGRAM

## 2022-08-01 PROCEDURE — 27000221 HC OXYGEN, UP TO 24 HOURS

## 2022-08-01 PROCEDURE — 94640 AIRWAY INHALATION TREATMENT: CPT

## 2022-08-01 PROCEDURE — 81003 URINALYSIS AUTO W/O SCOPE: CPT | Performed by: STUDENT IN AN ORGANIZED HEALTH CARE EDUCATION/TRAINING PROGRAM

## 2022-08-01 PROCEDURE — 99900035 HC TECH TIME PER 15 MIN (STAT)

## 2022-08-01 PROCEDURE — 83735 ASSAY OF MAGNESIUM: CPT | Performed by: STUDENT IN AN ORGANIZED HEALTH CARE EDUCATION/TRAINING PROGRAM

## 2022-08-01 PROCEDURE — 93010 EKG 12-LEAD: ICD-10-PCS | Mod: ,,, | Performed by: INTERNAL MEDICINE

## 2022-08-01 RX ORDER — AZITHROMYCIN 250 MG/1
500 TABLET, FILM COATED ORAL DAILY
Status: DISCONTINUED | OUTPATIENT
Start: 2022-08-01 | End: 2022-08-01

## 2022-08-01 RX ORDER — LEVOTHYROXINE SODIUM 20 UG/ML
25 INJECTION, SOLUTION INTRAVENOUS DAILY
Status: DISCONTINUED | OUTPATIENT
Start: 2022-08-01 | End: 2022-08-02

## 2022-08-01 RX ORDER — RISPERIDONE 0.5 MG/1
1 TABLET, ORALLY DISINTEGRATING ORAL NIGHTLY
Status: DISCONTINUED | OUTPATIENT
Start: 2022-08-01 | End: 2022-08-01

## 2022-08-01 RX ORDER — SODIUM,POTASSIUM PHOSPHATES 280-250MG
2 POWDER IN PACKET (EA) ORAL 3 TIMES DAILY PRN
Status: DISCONTINUED | OUTPATIENT
Start: 2022-08-01 | End: 2022-08-01

## 2022-08-01 RX ORDER — AZITHROMYCIN 250 MG/1
250 TABLET, FILM COATED ORAL DAILY
Status: DISCONTINUED | OUTPATIENT
Start: 2022-08-02 | End: 2022-08-01

## 2022-08-01 RX ORDER — DIVALPROEX SODIUM 125 MG/1
125 CAPSULE, COATED PELLETS ORAL EVERY 12 HOURS
Status: DISCONTINUED | OUTPATIENT
Start: 2022-08-01 | End: 2022-08-05

## 2022-08-01 RX ORDER — IPRATROPIUM BROMIDE AND ALBUTEROL SULFATE 2.5; .5 MG/3ML; MG/3ML
3 SOLUTION RESPIRATORY (INHALATION) EVERY 4 HOURS
Status: DISCONTINUED | OUTPATIENT
Start: 2022-08-01 | End: 2022-08-02

## 2022-08-01 RX ORDER — AZITHROMYCIN 250 MG/1
500 TABLET, FILM COATED ORAL DAILY
Status: COMPLETED | OUTPATIENT
Start: 2022-08-01 | End: 2022-08-03

## 2022-08-01 RX ADMIN — IPRATROPIUM BROMIDE AND ALBUTEROL SULFATE 3 ML: 2.5; .5 SOLUTION RESPIRATORY (INHALATION) at 03:08

## 2022-08-01 RX ADMIN — IPRATROPIUM BROMIDE AND ALBUTEROL SULFATE 3 ML: 2.5; .5 SOLUTION RESPIRATORY (INHALATION) at 12:08

## 2022-08-01 RX ADMIN — LOSARTAN POTASSIUM 100 MG: 50 TABLET, FILM COATED ORAL at 09:08

## 2022-08-01 RX ADMIN — HYPROMELLOSE 2910 1 DROP: 5 SOLUTION OPHTHALMIC at 09:08

## 2022-08-01 RX ADMIN — ENOXAPARIN SODIUM 40 MG: 40 INJECTION SUBCUTANEOUS at 06:08

## 2022-08-01 RX ADMIN — IPRATROPIUM BROMIDE AND ALBUTEROL SULFATE 3 ML: .5; 3 SOLUTION RESPIRATORY (INHALATION) at 07:08

## 2022-08-01 RX ADMIN — AZITHROMYCIN MONOHYDRATE 500 MG: 500 INJECTION, POWDER, LYOPHILIZED, FOR SOLUTION INTRAVENOUS at 01:08

## 2022-08-01 RX ADMIN — DIVALPROEX SODIUM 125 MG: 125 CAPSULE, COATED PELLETS ORAL at 09:08

## 2022-08-01 RX ADMIN — MICONAZOLE NITRATE: 20 CREAM TOPICAL at 09:08

## 2022-08-01 RX ADMIN — AZITHROMYCIN MONOHYDRATE 500 MG: 250 TABLET ORAL at 02:08

## 2022-08-01 RX ADMIN — IPRATROPIUM BROMIDE AND ALBUTEROL SULFATE 3 ML: 2.5; .5 SOLUTION RESPIRATORY (INHALATION) at 08:08

## 2022-08-01 RX ADMIN — HYPROMELLOSE 2910 1 DROP: 5 SOLUTION OPHTHALMIC at 02:08

## 2022-08-01 RX ADMIN — LEVOTHYROXINE SODIUM 50 MCG: 50 TABLET ORAL at 06:08

## 2022-08-01 RX ADMIN — HYPROMELLOSE 2910 1 DROP: 5 SOLUTION OPHTHALMIC at 08:08

## 2022-08-01 NOTE — PT/OT/SLP PROGRESS
Physical Therapy      Patient Name:  Carmina Arcos   MRN:  [id number]    Patient not seen today secondary to Nurse/ ALEKSANDR hold. Pt with decreased alertness and unable to follow 1-step commands on this date.  Will follow-up 8/2/22.

## 2022-08-01 NOTE — PROGRESS NOTES
"[facility] Medicine  Progress Note    Patient Name: Carmina Arcos  MRN: [id number]  Patient Class: IP- Inpatient   Admission Date: 7/24/2022  Length of Stay: 0 days  Attending Physician: John Nguyen MD  Primary Care Provider: Dick Graham MD        Subjective:     Principal Problem:Acute psychosis        HPI:  History provided mostly by daughter Nimco. Ms Grewal is a 89 yo that  according to daughter after having a 3-4 weeks ago COPD exacerbation, was started on Prednisone by her son who is a nurse. States  that after a few days she started noticing behavioral changes described as flight of ideas, insomnia, rapid speech, alternating irritability with excitability, grandiose delusions auditory hallucinations, fixation with allegories, numerology and Church.  Daughter also affirms, reckless spending. She was taken to [facility] but there was no psych service available. They decided to come to [facility] for psychiatric evaluation.  States previous episode precipitated by "diet pills " in 1999 and had to be committed to [facility] for a week. There she was started on risperidone, its unclear for how long she was on it. Daughter states that the patient also has a history of depression with ne suicide attempt.    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 She currently was living on her own and managed rental property, her family lives close to her home and visit regularly.    PMH: Breast Cancer? Colon Cancer. HTN treated with losartan hydrochlorothiazide and amlodipine, Hypothyroidism treated with levothyroxine, COPD treated with with formoterol-glycopyrrolate , albuterol PRN and Sildenafil given by his son.    Surgical hx positive for bilateral knee replacement, tonsillectomy, hysterectomy, right lumpectomy and colon surgery .    Allergies: Penicillin      Overview/Hospital Course:  Admitted to hospital medicine for " concerns of drug induced delirium vs sujata. Psychiatry consulted and recommended Risperdal. Patient still agitated the first night on Risperidal,      Interval History:   Patient reported to have acid base disorder , was put on Bipap with improvement of symptoms, fever reported. Patient did not sleep well. Sleeping while on the room son stated that Bipap helped . Depakote and risperidal will be held. Awaiting for UA results. Will start azytromycin and increase duon eb frequency    Review of Systems  Objective:     Vital Signs (Most Recent):  Temp: 97.8 °F (36.6 °C) (08/01/22 0732)  Pulse: 81 (08/01/22 1519)  Resp: 18 (08/01/22 1519)  BP: (!) 152/72 (08/01/22 0732)  SpO2: (!) 94 % (08/01/22 1519)   Vital Signs (24h Range):  Temp:  [96.9 °F (36.1 °C)-100.6 °F (38.1 °C)] 97.8 °F (36.6 °C)  Pulse:  [] 81  Resp:  [16-38] 18  SpO2:  [84 %-97 %] 94 %  BP: (110-155)/(61-73) 152/72     Weight: 80.3 kg (177 lb)  Body mass index is 31.35 kg/m².  No intake or output data in the 24 hours ending 08/01/22 1559   Physical Exam    Significant Labs: All pertinent labs within the past 24 hours have been reviewed.  Recent Lab Results  (Last 5 results in the past 24 hours)        08/01/22  0511   07/31/22  2317   07/31/22  1932   07/31/22  1641   07/31/22  1625        Albumin 3.0               Alkaline Phosphatase 80               Allens Test   N/A   Pass   Pass         ALT 12               Anion Gap 6               AST 12               Baso # 0.05               Basophil % 0.5               BILIRUBIN TOTAL 0.6  Comment: For infants and newborns, interpretation of results should be based  on gestational age, weight and in agreement with clinical  observations.    Premature Infant recommended reference ranges:  Up to 24 hours.............<8.0 mg/dL  Up to 48 hours............<12.0 mg/dL  3-5 days..................<15.0 mg/dL  6-29 days.................<15.0 mg/dL                 Blood Culture, Routine         No Growth to  date  [P]       Site   Other   RR   RR         BUN 18               Calcium 9.5               Chloride 101               CO2 35               Creatinine 0.6               DelSys   Nasal Can   CPAP/BiPAP   Nasal Can         Differential Method Automated               eGFR >60.0               Eos # 0.2               Eosinophil % 1.8               EP     5           FiO2     35   32         Flow       3         Glucose 160               Gran # (ANC) 7.6               Gran % 79.2               Hematocrit 41.4               Hemoglobin 12.6               Immature Grans (Abs) 0.03  Comment: Mild elevation in immature granulocytes is non specific and   can be seen in a variety of conditions including stress response,   acute inflammation, trauma and pregnancy. Correlation with other   laboratory and clinical findings is essential.                 Immature Granulocytes 0.3               IP     20           Lymph # 1.0               Lymph % 10.5               Magnesium 2.3               MCH 28.5               MCHC 30.4               MCV 94               Min Vol     8.7           Mode     BiPAP   SPONT         Mono # 0.7               Mono % 7.7               MPV 10.6               nRBC 0               Phosphorus 2.2               Platelets 169               POC BE   16   16   14         POC HCO3   40.8   41.9   41.6         POC PCO2   68.0   81.0   94.0         POC pCO2 Temp       98.4         POC PH   7.386   7.322   7.254         POC pH Temp       7.239         POC PO2   28   75   83         POC pO2 Temp       88         POC SATURATED O2   48   93   93         POC TCO2   43   44   44         POC Temp       100.5 F         Potassium 4.4               PROTEIN TOTAL 6.4               Rate     12   24         RBC 4.42               RDW 12.9               Sample   VENOUS   ARTERIAL   ARTERIAL         Sodium 142               WBC 9.62                                       [P] - Preliminary Result               Significant  Imaging: I have reviewed all pertinent imaging results/findings within the past 24 hours.      Assessment/Plan:      * Acute psychosis  Ms. Arcos is a 89 yo that  according to daughter after having a 3-4 weeks ago COPD exacerbation, was started on Prednisone by her son who is a nurse. States  that after a few days she started noticing behavioral changes described as flight of ideas, insomnia, rapid speech, alternating irritability with excitability, grandiose delusions auditory hallucinations, fixation with allegories, numerology and Mu-ism.     Differential includes but not limited to drug induced delirium like from recent steroids use vs manic episode from mood disorder. Unlikely to be 2/2 to infection given normal WBC and lacks of infectious symptoms. UA is also negative. UDS is also negative. TSH is normal, low likely moran for thyroid condition     - Continue to follow Psych recs   - Continue to monitor mood and mental status   - Delirium precautions   - Holding further steroids       Delirium        Manic behavior        COPD, severity to be determined  - wheezing present today   - Duo Neb scheduled q6h while awake   - Wean Oxygen with goal sat of 88%   -Avoid Steroid nebulizer.  -patient has refused nebulization, agrees to do them today      Mood disorder due to a general medical condition  See drug induced delirium     Sensorineural hearing loss (SNHL) of both ears        Hypertension  HCTZ 25 mg QD, Losartan 100 mg QD         Hx of breast cancer  S/p right lumpectomy and radiation       VTE Risk Mitigation (From admission, onward)         Ordered     enoxaparin injection 40 mg  Daily         07/24/22 1332     IP VTE HIGH RISK PATIENT  Once         07/24/22 1332     Place sequential compression device  Until discontinued         07/24/22 1332                Discharge Planning   ANA: 8/3/2022     Code Status: Full Code   Is the patient medically ready for discharge?: No    Reason for patient still in  hospital (select all that apply): Treatment  Discharge Plan A: Home Health   Discharge Delays: None known at this time              Gadiel Grajeda MD  Department of Hospital Medicine   [facility]

## 2022-08-01 NOTE — SUBJECTIVE & OBJECTIVE
"    Family History    None       Tobacco Use    Smoking status: Current Every Day Smoker     Types: Cigarettes    Smokeless tobacco: Never Used    Tobacco comment: smoked for 30-35 years per daughter quit many years ago   Substance and Sexual Activity    Alcohol use: Yes     Comment: socially less than one a month    Drug use: No    Sexual activity: Not Currently     Psychotherapeutics (From admission, onward)                Start     Stop Route Frequency Ordered    07/29/22 1727  OLANZapine injection 1.3 mg         -- IM Every 8 hours PRN 07/29/22 1727    07/27/22 1612  risperiDONE disintegrating tablet 0.5 mg         -- Oral Every 8 hours PRN 07/27/22 1512               Objective:     Vital Signs (Most Recent):  Temp: 97.8 °F (36.6 °C) (08/01/22 0732)  Pulse: 81 (08/01/22 1519)  Resp: 18 (08/01/22 1519)  BP: (!) 152/72 (08/01/22 0732)  SpO2: (!) 94 % (08/01/22 1519) Vital Signs (24h Range):  Temp:  [96.9 °F (36.1 °C)-100.6 °F (38.1 °C)] 97.8 °F (36.6 °C)  Pulse:  [] 81  Resp:  [16-38] 18  SpO2:  [84 %-97 %] 94 %  BP: (110-155)/(61-73) 152/72     Height: 5' 3" (160 cm)  Weight: 80.3 kg (177 lb)  Body mass index is 31.35 kg/m².    No intake or output data in the 24 hours ending 08/01/22 1716    Mental Status Exam:  Appearance: age appropriate, well nourished, casually dressed, lying in bed  Behavior/Cooperation: normal, cooperative  Speech: slurred, normal rate, normal pitch, normal volume  Mood: fine  Affect: expansive  Thought Process: loose associations  Thought Content: delusions: yes, grandiose and hyperreligious. No SI/HI/AVH.    Orientation: person, place, situation  Memory: Recent and remote intact  Attention Span/Concentration: Impaired  Insight: intact  Judgment: limited    Significant Labs: Last 24 Hours:   Recent Lab Results         08/01/22  0511   07/31/22  2317   07/31/22  1932        Albumin 3.0           Alkaline Phosphatase 80           Allens Test   N/A   Pass       ALT 12           Anion " Gap 6           AST 12           Baso # 0.05           Basophil % 0.5           BILIRUBIN TOTAL 0.6  Comment: For infants and newborns, interpretation of results should be based  on gestational age, weight and in agreement with clinical  observations.    Premature Infant recommended reference ranges:  Up to 24 hours.............<8.0 mg/dL  Up to 48 hours............<12.0 mg/dL  3-5 days..................<15.0 mg/dL  6-29 days.................<15.0 mg/dL             Site   Other   RR       BUN 18           Calcium 9.5           Chloride 101           CO2 35           Creatinine 0.6           DelSys   Nasal Can   CPAP/BiPAP       Differential Method Automated           eGFR >60.0           Eos # 0.2           Eosinophil % 1.8           EP     5       FiO2     35       Glucose 160           Gran # (ANC) 7.6           Gran % 79.2           Hematocrit 41.4           Hemoglobin 12.6           Immature Grans (Abs) 0.03  Comment: Mild elevation in immature granulocytes is non specific and   can be seen in a variety of conditions including stress response,   acute inflammation, trauma and pregnancy. Correlation with other   laboratory and clinical findings is essential.             Immature Granulocytes 0.3           IP     20       Lymph # 1.0           Lymph % 10.5           Magnesium 2.3           MCH 28.5           MCHC 30.4           MCV 94           Min Vol     8.7       Mode     BiPAP       Mono # 0.7           Mono % 7.7           MPV 10.6           nRBC 0           Phosphorus 2.2           Platelets 169           POC BE   16   16       POC HCO3   40.8   41.9       POC PCO2   68.0   81.0       POC PH   7.386   7.322       POC PO2   28   75       POC SATURATED O2   48   93       POC TCO2   43   44       Potassium 4.4           PROTEIN TOTAL 6.4           Rate     12       RBC 4.42           RDW 12.9           Sample   VENOUS   ARTERIAL       Sodium 142           WBC 9.62                   Significant Imaging: None

## 2022-08-01 NOTE — NURSING
ABG resulted, PH 7.32, CO2 81. Dr. Hagen and rapid response notified. Family at bedside. Dr. Hagen, attending and RT at bedside. Pt AAO x 4, O2 96, RR 26 . Pt taken off Bipap, receiving breathing treatment. Orders to place pt on 1L NC and repeat ABG at 2300. VISI on, will continue to monitor.

## 2022-08-01 NOTE — NURSING
"Messaged team this morning awaiting doctors response..    "Hello, nikki messaging because I am concerned about ms andre. Upon morning round she was up talking and alert. When I went back in to do med pass she would arouse when I tap her but goes back to sleep. She was unable to take her morning medications due to her not being alert enough to take PO medication for risk of aspiration."  "

## 2022-08-01 NOTE — PLAN OF CARE
"[facility]  Discharge Reassessment    Primary Care Provider: Dick Graham MD    Expected Discharge Date: 8/3/2022    Reassessment (most recent)     Discharge Reassessment - 08/01/22 0946        Discharge Reassessment    Assessment Type Discharge Planning Reassessment     Did the patient's condition or plan change since previous assessment? Yes     Discharge Plan discussed with: Adult children;Patient     Discharge Plan A Home Health     Discharge Plan B Psychiatric hospital     DME Needed Upon Discharge  none     Discharge Barriers Identified Mental illness     Why the patient remains in the hospital Requires continued medical care        Post-Acute Status    Post-Acute Authorization Placement     Post-Acute Placement Status Referrals Sent     Discharge Delays None known at this time               7/31/22: Patient resting in bed with family at bedside, minimally interactive with eyes closed throughout most of interaction. Described the year as "2023" and when informed it was 2022, denied and stated that it was 2023. Perseverated on saying "they weren't telling the truth," and when asked who "they" were, she continuously stated "everyone." Reportedly not given risperdal and depakote d/t confusion and inability to swallow.     DCP: home health vs psych facility    Khadra Robison RN [facility] [phone]  "

## 2022-08-01 NOTE — PROGRESS NOTES
"[facility] Surg  Psychiatry  Progress Note    Patient Name: Carmina Arcos  MRN: [id number]   Code Status: Full Code  Admission Date: 7/24/2022  Hospital Length of Stay: 0 days  Expected Discharge Date: 8/3/2022  Attending Physician: John Nguyen MD  Primary Care Provider: Dick Graham MD    Current Legal Status: Uncontested    Patient information was obtained from patient, relative(s) and ER records.       Subjective:     Patient is a 89 y.o., female, presents with:    Principal Problem:Acute psychosis    Chief Complaint: psychosis/sujata    HPI: Carmina Arcos is a 89 y.o. female with a past psychiatric history of substance-induced psychotic disorder who [place] [facility] [place]. Psychiatry was consulted for "sujata x several days, multiple ED visits".    Per Primary Team:  89 yo W with pmhx COPD, anxiety, depression, HTN, breast CA presents with family for psych evaluation.  History is assisted by the patient and the patient's daughter.  They note that patient was having coughing flares of COPD 3 weeks ago and she was started on course of prednisone by a family member.  She was on the prednisone for almost 2 weeks.  The prednisone has since discontinued that she continues to have manic symptoms including insomnia, reckless spending behavior, typical references Septra.  She was seen in the emergency department several days ago and discharged home with close observation by family members and outpatient psychiatry follow-up.  That follow-up is in 6 days.  Family states that she will need a sooner psychiatric evaluation.  She did have history of manic episode many years ago in the setting of taking a diet pill.  She was admitted inpatient time.  The patient does report some foul-smelling odor in her urine and is concerned that she has a kidney infection.  Otherwise she is essentially without complaints.     The history is provided by the patient and a relative. No  was " "used.     Per Psychiatry:  Patient was seen in the ED with daughter at bedside; pt and daughter provided history together. Pt was alert, oriented x4, rapid pressured interruptive speech, euthymic affect. They report about a week of psychiatric symptoms including excitability/irritability, anxiety, insomnia, hyperactivity, visual hallucinations, hyperreligious and grandiose delusions. Pt has been preoccupied with numerology and believes her family is part of a biblical story and that she has been in the [facility] with Lázaro. She has also been impulsively shopping and spending large amounts of money. She has intermittent confusion, FOI, BALA on exam.    Pt has a history of COPD and started a tapered 2-wk course of PO Prednisone for a presumed exacerbation, which she got from her son (a nurse and RT). Before the course was completed, the pt's psychiatric symptoms started as described above. Family was concerned that the Prednisone may be causing her symptoms but they could not convince her to stop taking it. They presented to the ED several times since then and were initially comfortable with outpatient follow-up with pt's former psychiatrist, Dr. Ceballos, on Friday 7/29. However, they have become increasingly concerned with pt's symptoms and feel she may be a danger to herself.     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 She speaks several languages and has no known history of cognitive decline. Her psychiatric history includes one substance-induced psychotic episode in 1999. She took some stimulant diet pills, developed insomnia and visual hallucinations, and was admitted to [facility]. She was started on Risperdal and Zoloft, which she continued to see Dr. Ceballos for for some time. Eventually her symptoms resolved and she went back to her "relatively high-strung" baseline, per daughter. She has been out of Dr. Ceballos's care and not having " "psychiatric issues for the past 10+ years. Pt's daughter also reports a remote suicide attempt over 30 yrs ago, via Benadryl overdose.    Prior to the course of Prednisone, pt was at baseline with none of her current symptoms. She last took Prednisone on Wednesday 7/20 but the symptoms have persisted. Pt and family are amenable to inpt hospitalization for her current symptoms.    Psychiatric Review of Systems-is patient experiencing or having changes in  sleep: yes, insomnia  appetite: no  weight: no  energy/anergy: yes, hyperactive  interest/pleasure/anhedonia: no  somatic symptoms: no  libido: no  anxiety/panic: yes  guilty/hopelessness: no  concentration: yes  S.I.B.s/risky behavior: yes, impulsive shopping  any drugs: no  alcohol: no     Allergies:  Penicillins      Hospital Course:   Initiated Seroquel 50 mg PO QHS overnight on 7/25, but had poor sleep, frequently getting up from bed and becoming agitated regarding delusional thought content (per daughter and nursing). Labile affect with hyperreligious thoughts. Disoriented to time, place. Seroquel was siwtched to risperdal 0.5mg qhs on 7/26, continued to be hyperreligious but AOx4. Intermittently agitated overnight on 7/27, with incident during which she fell at 3AM as she frequently got out of bed without assistance. Became physically threatening with staff in the AM. Continued to have minimal sleep.     7/28/22:  Overnight, pt had another fall/weakness episode requiring assistance to return to bed from bathroom. She received scheduled Risperdal 1 mg PO scheduled and 0.5 mg PRN @ 0216 for agitation. Pt reportedly did not respond to the PRN medication and continued to be elevated, irritable, and not sleeping. This morning, pt was sleeping but rousable for interview. She continued to be irritable and hyperreligious, referring to her "visions" and sleeping when God wants her to. She was oriented x3 and denied complaints other than wanting to sleep and being " "tired of getting interrupted. Interview terminated due to pt's irritability.    7/29/22:  Patient was agitated yesterday, frequently screaming, crying, and calling family members to discuss Orthodox delusions. She received PRN IM Zyprexa 2.5 mg yesterday afternoon and slept for several hours afterward. She received her first dose of QHS VPA yesterday and slept well throughout the night, per nursing. Pt was sleeping and difficult to arouse this morning but participated in brief psychiatric interview. She was oriented to time/place and endorsed good sleep overnight. Recalls recent events (daughter bringing baked goods to bedside). She reported she was still having her "visions" but said they were "allegory", not real. Interview terminated due to pt's drowsiness. Per nursing, pt was conversant/oriented this morning with son-in-law at bedside, both remarked how much calmer she was today than previous days.    7/31/22: Patient resting in bed with family at bedside, minimally interactive with eyes closed throughout most of interaction. Described the year as "2023" and when informed it was 2022, denied and stated that it was 2023. Perseverated on saying "they weren't telling the truth," and when asked who "they" were, she continuously stated "everyone." Reportedly not given risperdal and depakote d/t confusion and inability to swallow.    8/1/22:  Patient was frequently somnolent and intermittently unresponsive over the weekend, suspected to be 2/2 CO2 narcosis (confirmed via ABG yesterday, improved with Bipap). Per chart, she did not receive PO psych meds over the weekend due to difficulty swallowing and poor mentation. Her PO Depakote was switched to IV Depakon 125 BID. She was seen sleeping, upright in bed this morning, difficult to rouse and to keep alert/attentive. Frequently falling asleep and mumbling incoherently but also answering appropriately at times; oriented x4. Her family at bedside is concerned about her " "breathing as well as her ongoing irritability and refusal to cooperate with care at times.          Family History    None       Tobacco Use    Smoking status: Current Every Day Smoker     Types: Cigarettes    Smokeless tobacco: Never Used    Tobacco comment: smoked for 30-35 years per daughter quit many years ago   Substance and Sexual Activity    Alcohol use: Yes     Comment: socially less than one a month    Drug use: No    Sexual activity: Not Currently     Psychotherapeutics (From admission, onward)                Start     Stop Route Frequency Ordered    07/29/22 1727  OLANZapine injection 1.3 mg         -- IM Every 8 hours PRN 07/29/22 1727    07/27/22 1612  risperiDONE disintegrating tablet 0.5 mg         -- Oral Every 8 hours PRN 07/27/22 1512               Objective:     Vital Signs (Most Recent):  Temp: 97.8 °F (36.6 °C) (08/01/22 0732)  Pulse: 81 (08/01/22 1519)  Resp: 18 (08/01/22 1519)  BP: (!) 152/72 (08/01/22 0732)  SpO2: (!) 94 % (08/01/22 1519) Vital Signs (24h Range):  Temp:  [96.9 °F (36.1 °C)-100.6 °F (38.1 °C)] 97.8 °F (36.6 °C)  Pulse:  [] 81  Resp:  [16-38] 18  SpO2:  [84 %-97 %] 94 %  BP: (110-155)/(61-73) 152/72     Height: 5' 3" (160 cm)  Weight: 80.3 kg (177 lb)  Body mass index is 31.35 kg/m².    No intake or output data in the 24 hours ending 08/01/22 1716    Mental Status Exam:  Appearance: age appropriate, well nourished, casually dressed, lying in bed  Behavior/Cooperation: normal, cooperative  Speech: slurred, normal rate, normal pitch, normal volume  Mood: fine  Affect: expansive  Thought Process: loose associations  Thought Content: delusions: yes, grandiose and hyperreligious. No SI/HI/AVH.    Orientation: person, place, situation  Memory: Recent and remote intact  Attention Span/Concentration: Impaired  Insight: intact  Judgment: limited    Significant Labs: Last 24 Hours:   Recent Lab Results         08/01/22  0511   07/31/22  2317   07/31/22  1932        Albumin " 3.0           Alkaline Phosphatase 80           Allens Test   N/A   Pass       ALT 12           Anion Gap 6           AST 12           Baso # 0.05           Basophil % 0.5           BILIRUBIN TOTAL 0.6  Comment: For infants and newborns, interpretation of results should be based  on gestational age, weight and in agreement with clinical  observations.    Premature Infant recommended reference ranges:  Up to 24 hours.............<8.0 mg/dL  Up to 48 hours............<12.0 mg/dL  3-5 days..................<15.0 mg/dL  6-29 days.................<15.0 mg/dL             Site   Other   RR       BUN 18           Calcium 9.5           Chloride 101           CO2 35           Creatinine 0.6           DelSys   Nasal Can   CPAP/BiPAP       Differential Method Automated           eGFR >60.0           Eos # 0.2           Eosinophil % 1.8           EP     5       FiO2     35       Glucose 160           Gran # (ANC) 7.6           Gran % 79.2           Hematocrit 41.4           Hemoglobin 12.6           Immature Grans (Abs) 0.03  Comment: Mild elevation in immature granulocytes is non specific and   can be seen in a variety of conditions including stress response,   acute inflammation, trauma and pregnancy. Correlation with other   laboratory and clinical findings is essential.             Immature Granulocytes 0.3           IP     20       Lymph # 1.0           Lymph % 10.5           Magnesium 2.3           MCH 28.5           MCHC 30.4           MCV 94           Min Vol     8.7       Mode     BiPAP       Mono # 0.7           Mono % 7.7           MPV 10.6           nRBC 0           Phosphorus 2.2           Platelets 169           POC BE   16   16       POC HCO3   40.8   41.9       POC PCO2   68.0   81.0       POC PH   7.386   7.322       POC PO2   28   75       POC SATURATED O2   48   93       POC TCO2   43   44       Potassium 4.4           PROTEIN TOTAL 6.4           Rate     12       RBC 4.42           RDW 12.9           Sample    VENOUS   ARTERIAL       Sodium 142           WBC 9.62                   Significant Imaging: None       Scheduled Medications:   albuterol-ipratropium  3 mL Nebulization Q4H    artificial tears  1 drop Both Eyes TID    azithromycin  500 mg Oral Daily    enoxaparin  40 mg Subcutaneous Daily    levothyroxine  25 mcg Intravenous Daily    losartan  100 mg Oral Daily    miconazole   Topical (Top) BID    valproate sodium (DEPACON) IVPB  130 mg Intravenous BID       PRN Medications:  acetaminophen, albuterol-ipratropium, dextrose 10%, dextrose 10%, glucagon (human recombinant), glucose, glucose, naloxone, OLANZapine, risperiDONE, sodium chloride 0.9%    Review of patient's allergies indicates:   Allergen Reactions    Penicillins Hives and Shortness Of Breath       Assessment/Plan:     * Acute psychosis  ASSESSMENT     Carmina Arcos is a 89 y.o. female with a past psychiatric history of SIPD, who presented to the [facility] due to manic behavior. Her symptoms of insomnia, hyperactivity, impulsivity, and delusions are likely 2/2 recent course of steroids. However, these symptoms are endangering her and have been difficult for her family to manage, especially due to the delay in seeing an outpatient provider. Pt's hospital course has been complicated by new somnolence and hypercapneic respiratory failure. Primary team initiated infectious workup.    IMPRESSION  Substance-induced psychotic disorder  R/o bipolar disorder    RECOMMENDATION(S)      1. Scheduled Medication(s):  - Continue Depakote 125mg PO BID to Depakote 125mg IV   - Continue Risperdal formulation to m-tabs (disintegrating) 1 mg PO QHS for acute sujata  - Can give Zyprexa 1.25 mg IM if refusing PO Risperdal  - Agree with starting Melatonin 9 mg PO QHS  - Primary team discussed starting Trazodone - we feel that the risks of orthostatic hypotension outweigh the potential benefit, which is likely minimal in this acutely manic patient    2. PRN Medication(s):  -  Continue Risperdal 0.5 mg PO q8hrs PRN non-redirectable agitation  - Continue PRN Zyprexa 2.5 mg PO or IM to 1.25 mg q8hrs PRN    3.  Monitor:  - Please obtain daily EKG to monitor QTc    4. Legal Status/Precaution(s):  - Patient does not meet criteria for PEC at this time. Patient does appear gravely disabled due to a psychiatric illness but is help-seeking and not contesting her admission. She may not have full capacity to refuse meds at this time given her altered mentation.  - Recommend fall precautions and strict bed rest with alarm on, low bed height, to avoid falls due to potential orthostatic hypotension secondary to Risperdal in this elderly patient.           Need for Continued Hospitalization:  Requires ongoing hospitalization for stabilization of medications.    Anticipated Disposition:  Home or Self Care vs SNF    Total time:  35 with greater than 50% of this time spent in counseling and/or coordination of care.       Alba Tyson MD   Psychiatry  [facility] S[facility]

## 2022-08-01 NOTE — ASSESSMENT & PLAN NOTE
Ms. Arcos is a 89 yo that  according to daughter after having a 3-4 weeks ago COPD exacerbation, was started on Prednisone by her son who is a nurse. States  that after a few days she started noticing behavioral changes described as flight of ideas, insomnia, rapid speech, alternating irritability with excitability, grandiose delusions auditory hallucinations, fixation with allegories, numerology and Oriental orthodox.     Differential includes but not limited to drug induced delirium like from recent steroids use vs manic episode from mood disorder. Unlikely to be 2/2 to infection given normal WBC and lacks of infectious symptoms. UA is also negative. UDS is also negative. TSH is normal, low likely moran for thyroid condition     - Continue to follow Psych recs   - Continue to monitor mood and mental status   - Delirium precautions   - Holding further steroids

## 2022-08-01 NOTE — CARE UPDATE
RAPID RESPONSE NURSE PROACTIVE ROUNDING NOTE       Time of Visit:     Admit Date: 2022  LOS: 0  Code Status: Full Code   Date of Visit: 2022  : 1931  Age: 89 y.o.  Sex: female  Race: White  Bed: 647/647 A:   MRN: [id number]  Was the patient discharged from an ICU this admission? No   Was the patient discharged from a PACU within last 24 hours? No   Did the patient receive conscious sedation/general anesthesia in last 24 hours? No  Was the patient in the ED within the past 24 hours? No  Was the patient on NIPPV within the past 24 hours? Yes   Attending Physician: John Nguyen MD  Primary Service: [facility] [place] [facility]   Time spent at the bedside: 15 -30 min    SITUATION    Notified by previous RRN during handoff.  Reason for alert: altered mental status, BiPAP needs  Called to evaluate the patient for Respiratory, neuro    BACKGROUND     Why is the patient in the hospital?: Acute psychosis    Patient has a past medical history of Anxiety, Breast cancer, Broken bones, Colon cancer, COPD (chronic obstructive pulmonary disease), Depression, Hypertension, and Manic episode without psychotic symptoms, unspecified.    Last Vitals:  Temp: 96.9 °F (36.1 °C) (1958)  Pulse: 93 (1958)  Resp: 29 (1958)  BP: 110/62 (1958)  SpO2: 96 % (1958)    24 Hours Vitals Range:  Temp:  [96.9 °F (36.1 °C)-100.6 °F (38.1 °C)]   Pulse:  [88-96]   Resp:  [16-30]   BP: (110-137)/(57-68)   SpO2:  [84 %-97 %]     Labs:  Recent Labs     22  0306   WBC 6.45   HGB 11.1*   HCT 36.5*          Recent Labs     22  0306      K 4.1      CO2 29   CREATININE 0.6   *   PHOS 3.0   MG 2.1        Recent Labs     22  1641 22  1932   PH 7.254* 7.322*   PCO2 94.0* 81.0*   PO2 83 75*   HCO3 41.6* 41.9*   POCSATURATED 93* 93*   BE 14 16        ASSESSMENT    Physical Exam  Cardiovascular:      Rate and Rhythm: Normal rate and regular rhythm.      Pulses: Normal pulses.    Pulmonary:      Breath sounds: Decreased breath sounds present.   Skin:     General: Skin is warm and dry.      Capillary Refill: Capillary refill takes less than 2 seconds.   Neurological:      Mental Status: She is alert.      GCS: GCS eye subscore is 4. GCS verbal subscore is 4. GCS motor subscore is 6.     HR 93  /62  SpO2 90% on 2L NC  RR 28    Patient sitting up in bed, on 2L nasal cannula. Daughters at bedside  Patient is alert, oriented to self. Currently comfortable on nasal cannula    INTERVENTIONS    The patient was seen for a Respiratory problem. Staff concerns included oxygen saturation < 90% despite supplemental oxygen and hypercapnia. The following interventions were performed: continued pulse ox monitoring and Bipap.     RECOMMENDATIONS    Encourage patient to take risperidone tonight for agitation  Encourage BiPAP use overnight regardless of repeat blood gas results from 2300    PROVIDER ESCALATION    Yes/No  yes    Orders received and case discussed with Dr. Hagen with hospital medicine.    Disposition: Remain in room 647.    FOLLOW-UP    charge Lynn CALIXTO, bedside RN Ebenezer, RRT Donald updated on plan of care. Instructed to call the Rapid Response Nurse, Orin Hutchison RN at [phone] for additional questions or concerns.

## 2022-08-01 NOTE — SUBJECTIVE & OBJECTIVE
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 Patient did not sleep well. Sleeping while on the room son stated that Bipap helped . Depakote and risperidal will be held. Awaiting for UA results. Will start azytromycin and increase duon eb frequency    Review of Systems  Objective:     Vital Signs (Most Recent):  Temp: 97.8 °F (36.6 °C) (08/01/22 0732)  Pulse: 81 (08/01/22 1519)  Resp: 18 (08/01/22 1519)  BP: (!) 152/72 (08/01/22 0732)  SpO2: (!) 94 % (08/01/22 1519)   Vital Signs (24h Range):  Temp:  [96.9 °F (36.1 °C)-100.6 °F (38.1 °C)] 97.8 °F (36.6 °C)  Pulse:  [] 81  Resp:  [16-38] 18  SpO2:  [84 %-97 %] 94 %  BP: (110-155)/(61-73) 152/72     Weight: 80.3 kg (177 lb)  Body mass index is 31.35 kg/m².  No intake or output data in the 24 hours ending 08/01/22 1559   Physical Exam    Significant Labs: All pertinent labs within the past 24 hours have been reviewed.  Recent Lab Results  (Last 5 results in the past 24 hours)        08/01/22  0511   07/31/22  2317   07/31/22  1932   07/31/22  1641   07/31/22  1625        Albumin 3.0               Alkaline Phosphatase 80               Allens Test   N/A   Pass   Pass         ALT 12               Anion Gap 6               AST 12               Baso # 0.05               Basophil % 0.5               BILIRUBIN TOTAL 0.6  Comment: For infants and newborns, interpretation of results should be based  on gestational age, weight and in agreement with clinical  observations.    Premature Infant recommended reference ranges:  Up to 24 hours.............<8.0 mg/dL  Up to 48 hours............<12.0 mg/dL  3-5 days..................<15.0 mg/dL  6-29 days.................<15.0 mg/dL                 Blood Culture, Routine         No Growth to date  [P]       Site   Other   RR   RR         BUN 18               Calcium 9.5               Chloride 101               CO2 35               Creatinine 0.6                DelSys   Nasal Can   CPAP/BiPAP   Nasal Can         Differential Method Automated               eGFR >60.0               Eos # 0.2               Eosinophil % 1.8               EP     5           FiO2     35   32         Flow       3         Glucose 160               Gran # (ANC) 7.6               Gran % 79.2               Hematocrit 41.4               Hemoglobin 12.6               Immature Grans (Abs) 0.03  Comment: Mild elevation in immature granulocytes is non specific and   can be seen in a variety of conditions including stress response,   acute inflammation, trauma and pregnancy. Correlation with other   laboratory and clinical findings is essential.                 Immature Granulocytes 0.3               IP     20           Lymph # 1.0               Lymph % 10.5               Magnesium 2.3               MCH 28.5               MCHC 30.4               MCV 94               Min Vol     8.7           Mode     BiPAP   SPONT         Mono # 0.7               Mono % 7.7               MPV 10.6               nRBC 0               Phosphorus 2.2               Platelets 169               POC BE   16   16   14         POC HCO3   40.8   41.9   41.6         POC PCO2   68.0   81.0   94.0         POC pCO2 Temp       98.4         POC PH   7.386   7.322   7.254         POC pH Temp       7.239         POC PO2   28   75   83         POC pO2 Temp       88         POC SATURATED O2   48   93   93         POC TCO2   43   44   44         POC Temp       100.5 F         Potassium 4.4               PROTEIN TOTAL 6.4               Rate     12   24         RBC 4.42               RDW 12.9               Sample   VENOUS   ARTERIAL   ARTERIAL         Sodium 142               WBC 9.62                                       [P] - Preliminary Result               Significant Imaging: I have reviewed all pertinent imaging results/findings within the past 24 hours.

## 2022-08-01 NOTE — PT/OT/SLP PROGRESS
Speech Language Pathology  Missed Visit      Carmina Arcos  MRN: [id number]    Patient not seen today secondary to Nursing hold due to decreased alertness. Will follow-up next service date.

## 2022-08-01 NOTE — NURSING
Pt disoriented, refuses to leave on bipap. Pt placed on 4L NC. O2 sat 92% on 4 L. Dr. Hagen notified and ok'd to leave on NC. Will continue to monitor.

## 2022-08-01 NOTE — NURSING
Pt O2 82, tachypneic, SOB. RT and Dr. Hagen notified. Pt placed on BiPAP by RT. O2 94% on bipap, will continue to monitor.

## 2022-08-01 NOTE — ASSESSMENT & PLAN NOTE
ASSESSMENT     Carmina Arcos is a 89 y.o. female with a past psychiatric history of SIPD, who presented to the [facility] [place] [facility] due to manic behavior. Her symptoms of insomnia, hyperactivity, impulsivity, and delusions are likely 2/2 recent course of steroids. However, these symptoms are endangering her and have been difficult for her family to manage, especially due to the delay in seeing an outpatient provider. Pt's hospital course has been complicated by new somnolence and hypercapneic respiratory failure. Primary team initiated infectious workup.    IMPRESSION  Substance-induced psychotic disorder  R/o bipolar disorder    RECOMMENDATION(S)      1. Scheduled Medication(s):  - Continue Depakote 125mg PO BID to Depakote 125mg IV   - Continue Risperdal formulation to m-tabs (disintegrating) 1 mg PO QHS for acute sujata  - Can give Zyprexa 1.25 mg IM if refusing PO Risperdal  - Agree with starting Melatonin 9 mg PO QHS  - Primary team discussed starting Trazodone - we feel that the risks of orthostatic hypotension outweigh the potential benefit, which is likely minimal in this acutely manic patient    2. PRN Medication(s):  - Continue Risperdal 0.5 mg PO q8hrs PRN non-redirectable agitation  - Continue PRN Zyprexa 2.5 mg PO or IM to 1.25 mg q8hrs PRN    3.  Monitor:  - Please obtain daily EKG to monitor QTc    4. Legal Status/Precaution(s):  - Patient does not meet criteria for PEC at this time. Patient does appear gravely disabled due to a psychiatric illness but is help-seeking and not contesting her admission. She may not have full capacity to refuse meds at this time given her altered mentation.  - Recommend fall precautions and strict bed rest with alarm on, low bed height, to avoid falls due to potential orthostatic hypotension secondary to Risperdal in this elderly patient.

## 2022-08-01 NOTE — PT/OT/SLP PROGRESS
Occupational Therapy      Patient Name:  Carmina Arcos   MRN:  [id number]    Patient not seen today secondary to Nurse/ ALEKSANDR hold  - decreased alertness, and MD team present. Will follow-up 8/2/22.    8/1/2022

## 2022-08-01 NOTE — CARE UPDATE
Patient re-evaluated by staff. ABG improved. Appears comfortable. Patient stable to remain on floor. CCM will sign off. Please call [phone] if re-evaluation is desired.        Marce Kathleen NP  Critical Care Medicine

## 2022-08-02 PROCEDURE — 92526 ORAL FUNCTION THERAPY: CPT

## 2022-08-02 PROCEDURE — 94761 N-INVAS EAR/PLS OXIMETRY MLT: CPT

## 2022-08-02 PROCEDURE — 25000242 PHARM REV CODE 250 ALT 637 W/ HCPCS: Performed by: STUDENT IN AN ORGANIZED HEALTH CARE EDUCATION/TRAINING PROGRAM

## 2022-08-02 PROCEDURE — 25000003 PHARM REV CODE 250: Performed by: HOSPITALIST

## 2022-08-02 PROCEDURE — 99233 SBSQ HOSP IP/OBS HIGH 50: CPT | Mod: GC,,, | Performed by: STUDENT IN AN ORGANIZED HEALTH CARE EDUCATION/TRAINING PROGRAM

## 2022-08-02 PROCEDURE — 25000003 PHARM REV CODE 250

## 2022-08-02 PROCEDURE — 94660 CPAP INITIATION&MGMT: CPT

## 2022-08-02 PROCEDURE — 25000003 PHARM REV CODE 250: Performed by: STUDENT IN AN ORGANIZED HEALTH CARE EDUCATION/TRAINING PROGRAM

## 2022-08-02 PROCEDURE — 63600175 PHARM REV CODE 636 W HCPCS: Performed by: STUDENT IN AN ORGANIZED HEALTH CARE EDUCATION/TRAINING PROGRAM

## 2022-08-02 PROCEDURE — 99900035 HC TECH TIME PER 15 MIN (STAT)

## 2022-08-02 PROCEDURE — 94640 AIRWAY INHALATION TREATMENT: CPT

## 2022-08-02 PROCEDURE — 63700000 PHARM REV CODE 250 ALT 637 W/O HCPCS: Performed by: STUDENT IN AN ORGANIZED HEALTH CARE EDUCATION/TRAINING PROGRAM

## 2022-08-02 PROCEDURE — 27000221 HC OXYGEN, UP TO 24 HOURS

## 2022-08-02 PROCEDURE — 99233 PR SUBSEQUENT HOSPITAL CARE,LEVL III: ICD-10-PCS | Mod: GC,,, | Performed by: STUDENT IN AN ORGANIZED HEALTH CARE EDUCATION/TRAINING PROGRAM

## 2022-08-02 PROCEDURE — 11000001 HC ACUTE MED/SURG PRIVATE ROOM

## 2022-08-02 RX ORDER — LEVOTHYROXINE SODIUM 50 UG/1
50 TABLET ORAL
Status: CANCELLED | OUTPATIENT
Start: 2022-08-02

## 2022-08-02 RX ORDER — LEVOTHYROXINE SODIUM 50 UG/1
50 TABLET ORAL
Status: DISCONTINUED | OUTPATIENT
Start: 2022-08-03 | End: 2022-08-09 | Stop reason: HOSPADM

## 2022-08-02 RX ORDER — ALBUTEROL SULFATE 90 UG/1
2 AEROSOL, METERED RESPIRATORY (INHALATION)
Status: DISCONTINUED | OUTPATIENT
Start: 2022-08-02 | End: 2022-08-05

## 2022-08-02 RX ORDER — RISPERIDONE 0.5 MG/1
0.5 TABLET, ORALLY DISINTEGRATING ORAL NIGHTLY
Status: DISCONTINUED | OUTPATIENT
Start: 2022-08-02 | End: 2022-08-09 | Stop reason: HOSPADM

## 2022-08-02 RX ORDER — OXYMETAZOLINE HCL 0.05 %
2 SPRAY, NON-AEROSOL (ML) NASAL 2 TIMES DAILY
Status: DISPENSED | OUTPATIENT
Start: 2022-08-02 | End: 2022-08-05

## 2022-08-02 RX ORDER — SODIUM,POTASSIUM PHOSPHATES 280-250MG
2 POWDER IN PACKET (EA) ORAL 2 TIMES DAILY
Status: COMPLETED | OUTPATIENT
Start: 2022-08-02 | End: 2022-08-02

## 2022-08-02 RX ADMIN — RISPERIDONE 0.5 MG: 0.5 TABLET, ORALLY DISINTEGRATING ORAL at 08:08

## 2022-08-02 RX ADMIN — LOSARTAN POTASSIUM 100 MG: 50 TABLET, FILM COATED ORAL at 08:08

## 2022-08-02 RX ADMIN — ALBUTEROL SULFATE 2 PUFF: 108 INHALANT RESPIRATORY (INHALATION) at 08:08

## 2022-08-02 RX ADMIN — AZITHROMYCIN MONOHYDRATE 500 MG: 250 TABLET ORAL at 08:08

## 2022-08-02 RX ADMIN — DIVALPROEX SODIUM 125 MG: 125 CAPSULE, COATED PELLETS ORAL at 08:08

## 2022-08-02 RX ADMIN — POTASSIUM & SODIUM PHOSPHATES POWDER PACK 280-160-250 MG 2 PACKET: 280-160-250 PACK at 08:08

## 2022-08-02 RX ADMIN — MICONAZOLE NITRATE: 20 CREAM TOPICAL at 08:08

## 2022-08-02 RX ADMIN — HYPROMELLOSE 2910 1 DROP: 5 SOLUTION OPHTHALMIC at 08:08

## 2022-08-02 RX ADMIN — ENOXAPARIN SODIUM 40 MG: 40 INJECTION SUBCUTANEOUS at 04:08

## 2022-08-02 RX ADMIN — RISPERIDONE 0.5 MG: 0.5 TABLET, ORALLY DISINTEGRATING ORAL at 02:08

## 2022-08-02 NOTE — PT/OT/SLP PROGRESS
Occupational Therapy      Patient Name:  Carmina Arcos   MRN:  [id number]    Patient not seen today secondary to Other (Comment) (attempted to see patient. Patient alert when OT entered. Son present stated she was napping and requested therapy to come back later). Will follow-up per plan of care.    8/2/2022

## 2022-08-02 NOTE — PROGRESS NOTES
"[facility] Surg  Psychiatry  Progress Note    Patient Name: Carmina Arcos  MRN: [id number]   Code Status: Full Code  Admission Date: 7/24/2022  Hospital Length of Stay: 1 days  Expected Discharge Date: 8/3/2022  Attending Physician: John Nguyen MD  Primary Care Provider: Dick Graham MD    Current Legal Status: Uncontested    Patient information was obtained from patient, relative(s) and ER records.       Subjective:     Patient is a 89 y.o., female, presents with:    Principal Problem:Acute psychosis    Chief Complaint: AMS    HPI: Carmina Arcos is a 89 y.o. female with a past psychiatric history of substance-induced psychotic disorder who presented to [facility] due to Manic behavior. Psychiatry was consulted for "sujata x several days, multiple ED visits".    Per Primary Team:  89 yo W with pmhx COPD, anxiety, depression, HTN, breast CA presents with family for psych evaluation.  History is assisted by the patient and the patient's daughter.  They note that patient was having coughing flares of COPD 3 weeks ago and she was started on course of prednisone by a family member.  She was on the prednisone for almost 2 weeks.  The prednisone has since discontinued that she continues to have manic symptoms including insomnia, reckless spending behavior, typical references Septra.  She was seen in the emergency department several days ago and discharged home with close observation by family members and outpatient psychiatry follow-up.  That follow-up is in 6 days.  Family states that she will need a sooner psychiatric evaluation.  She did have history of manic episode many years ago in the setting of taking a diet pill.  She was admitted inpatient time.  The patient does report some foul-smelling odor in her urine and is concerned that she has a kidney infection.  Otherwise she is essentially without complaints.     The history is provided by the patient and a relative. No  was used. " "    Per Psychiatry:  Patient was seen in the ED with daughter at bedside; pt and daughter provided history together. Pt was alert, oriented x4, rapid pressured interruptive speech, euthymic affect. They report about a week of psychiatric symptoms including excitability/irritability, anxiety, insomnia, hyperactivity, visual hallucinations, hyperreligious and grandiose delusions. Pt has been preoccupied with numerology and believes her family is part of a biblical story and that she has been in the [place] [facility] with L[facility]aro. She has also been impulsively shopping and spending large amounts of money. She has intermittent confusion, FOI, BALA on exam.    Pt has a history of COPD and started a tapered 2-wk course of PO Prednisone for a presumed exacerbation, which she got from her son (a nurse and RT). Before the course was completed, the pt's psychiatric symptoms started as described above. Family was concerned that the Prednisone may be causing her symptoms but they could not convince her to stop taking it. They presented to the ED several times since then and were initially comfortable with outpatient follow-up with pt's former psychiatrist, Dr. Ceballos, on Friday 7/29. However, they have become increasingly concerned with pt's symptoms and feel she may be a danger to herself.     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 She speaks several languages and has no known history of cognitive decline. Her psychiatric history includes one substance-induced psychotic episode in 1999. She took some stimulant diet pills, developed insomnia and visual hallucinations, and was admitted to [facility]. She was started on Risperdal and Zoloft, which she continued to see Dr. Ceballos for for some time. Eventually her symptoms resolved and she went back to her "relatively high-strung" baseline, per daughter. She has been out of Dr. Ceballos's care and not having " "psychiatric issues for the past 10+ years. Pt's daughter also reports a remote suicide attempt over 30 yrs ago, via Benadryl overdose.    Prior to the course of Prednisone, pt was at baseline with none of her current symptoms. She last took Prednisone on Wednesday 7/20 but the symptoms have persisted. Pt and family are amenable to inpt hospitalization for her current symptoms.    Psychiatric Review of Systems-is patient experiencing or having changes in  sleep: yes, insomnia  appetite: no  weight: no  energy/anergy: yes, hyperactive  interest/pleasure/anhedonia: no  somatic symptoms: no  libido: no  anxiety/panic: yes  guilty/hopelessness: no  concentration: yes  S.I.B.s/risky behavior: yes, impulsive shopping  any drugs: no  alcohol: no     Allergies:  Penicillins      Hospital Course:   Initiated Seroquel 50 mg PO QHS overnight on 7/25, but had poor sleep, frequently getting up from bed and becoming agitated regarding delusional thought content (per daughter and nursing). Labile affect with hyperreligious thoughts. Disoriented to time, place. Seroquel was siwtched to risperdal 0.5mg qhs on 7/26, continued to be hyperreligious but AOx4. Intermittently agitated overnight on 7/27, with incident during which she fell at 3AM as she frequently got out of bed without assistance. Became physically threatening with staff in the AM. Continued to have minimal sleep.     7/28/22:  Overnight, pt had another fall/weakness episode requiring assistance to return to bed from bathroom. She received scheduled Risperdal 1 mg PO scheduled and 0.5 mg PRN @ 0216 for agitation. Pt reportedly did not respond to the PRN medication and continued to be elevated, irritable, and not sleeping. This morning, pt was sleeping but rousable for interview. She continued to be irritable and hyperreligious, referring to her "visions" and sleeping when God wants her to. She was oriented x3 and denied complaints other than wanting to sleep and being " "tired of getting interrupted. Interview terminated due to pt's irritability.    7/29/22:  Patient was agitated yesterday, frequently screaming, crying, and calling family members to discuss Mormon delusions. She received PRN IM Zyprexa 2.5 mg yesterday afternoon and slept for several hours afterward. She received her first dose of QHS VPA yesterday and slept well throughout the night, per nursing. Pt was sleeping and difficult to arouse this morning but participated in brief psychiatric interview. She was oriented to time/place and endorsed good sleep overnight. Recalls recent events (daughter bringing baked goods to bedside). She reported she was still having her "visions" but said they were "allegory", not real. Interview terminated due to pt's drowsiness. Per nursing, pt was conversant/oriented this morning with son-in-law at bedside, both remarked how much calmer she was today than previous days.    7/31/22: Patient resting in bed with family at bedside, minimally interactive with eyes closed throughout most of interaction. Described the year as "2023" and when informed it was 2022, denied and stated that it was 2023. Perseverated on saying "they weren't telling the truth," and when asked who "they" were, she continuously stated "everyone." Reportedly not given risperdal and depakote d/t confusion and inability to swallow.    8/1/22:  Patient was frequently somnolent and intermittently unresponsive over the weekend, suspected to be 2/2 CO2 narcosis (confirmed via ABG yesterday, improved with Bipap). Per chart, she did not receive PO psych meds over the weekend due to difficulty swallowing and poor mentation. Her PO Depakote was switched to IV Depakon 125 BID. She was seen sleeping, upright in bed this morning, difficult to rouse and to keep alert/attentive. Frequently falling asleep and mumbling incoherently but also answering appropriately at times; oriented x4. Her family at bedside is concerned about her " "breathing as well as her ongoing irritability and refusal to cooperate with care at times.    8/2/22:  Slept poorly overnight per daughter in room and per nursing notes. Patient reports she slept fine. Not wearing O2 at this time. Says that if she wears it she will die. Cannot explain why - instead tells me to "fast forward, you'll find out." Oriented to year, month, place, self. Hurrying me along - "Go ahead ask another question doctor" - "Can you hurry and ask your questions?" Daughter says she is rushing me because then she doesn't have to try and organize her thoughts.         Family History    None       Tobacco Use    Smoking status: Current Every Day Smoker     Types: Cigarettes    Smokeless tobacco: Never Used    Tobacco comment: smoked for 30-35 years per daughter quit many years ago   Substance and Sexual Activity    Alcohol use: Yes     Comment: socially less than one a month    Drug use: No    Sexual activity: Not Currently     Psychotherapeutics (From admission, onward)                Start     Stop Route Frequency Ordered    07/29/22 1727  OLANZapine injection 1.3 mg         -- IM Every 8 hours PRN 07/29/22 1727    07/27/22 1612  risperiDONE disintegrating tablet 0.5 mg         -- Oral Every 8 hours PRN 07/27/22 1512           Objective:     Vital Signs (Most Recent):  Temp: 97.9 °F (36.6 °C) (08/02/22 0753)  Pulse: 91 (08/02/22 0753)  Resp: 20 (08/02/22 0753)  BP: 135/70 (08/02/22 0753)  SpO2: 95 % (08/02/22 0753) Vital Signs (24h Range):  Temp:  [97.9 °F (36.6 °C)-98.3 °F (36.8 °C)] 97.9 °F (36.6 °C)  Pulse:  [] 91  Resp:  [16-20] 20  SpO2:  [93 %-97 %] 95 %  BP: (129-135)/(60-70) 135/70     Height: 5' 3" (160 cm)  Weight: 80.3 kg (177 lb)  Body mass index is 31.35 kg/m².    No intake or output data in the 24 hours ending 08/02/22 1048    Mental Status Exam:  Appearance: age appropriate, well nourished, casually dressed, lying in bed  Behavior/Cooperation: normal, cooperative  Speech: " slurred, normal rate, normal pitch, normal volume  Mood: fine  Affect: expansive  Thought Process: loose associations  Thought Content: delusions: yes, grandiose and hyperreligious. No SI/HI/AVH.    Orientation: person, place, situation  Memory: Recent and remote intact  Attention Span/Concentration: Impaired  Insight: intact  Judgment: limited    Significant Labs: Last 24 Hours:   Recent Lab Results         08/01/22  1631        Appearance, UA Clear       Bilirubin (UA) Negative       Color, UA Yellow       Glucose, UA 1+       Ketones, UA Trace       Leukocytes, UA Negative       NITRITE UA Negative       Occult Blood UA Negative       pH, UA 5.0       Protein, UA Negative  Comment: Recommend a 24 hour urine protein or a urine   protein/creatinine ratio if globulin induced proteinuria is  clinically suspected.         Specific Gravity, UA 1.020       Specimen UA Urine, Catheterized                    Scheduled Medications:   albuterol-ipratropium  3 mL Nebulization Q4H    artificial tears  1 drop Both Eyes TID    azithromycin  500 mg Oral Daily    divalproex  125 mg Oral Q12H    enoxaparin  40 mg Subcutaneous Daily    [START ON 8/3/2022] levothyroxine  50 mcg Oral Before breakfast    losartan  100 mg Oral Daily    miconazole   Topical (Top) BID    potassium, sodium phosphates  2 packet Oral BID       PRN Medications:  acetaminophen, albuterol-ipratropium, dextrose 10%, dextrose 10%, glucagon (human recombinant), glucose, glucose, naloxone, OLANZapine, risperiDONE, sodium chloride 0.9%    Review of patient's allergies indicates:   Allergen Reactions    Penicillins Hives and Shortness Of Breath       Assessment/Plan:     * Acute psychosis  ASSESSMENT     Carmina Arcos is a 89 y.o. female with a past psychiatric history of SIPD, who [place] [facility] [place]. Her symptoms of insomnia, hyperactivity, impulsivity, and delusions are likely 2/2 recent course of steroids. However, these  symptoms are endangering her and have been difficult for her family to manage, especially due to the delay in seeing an outpatient provider. Pt's hospital course has been complicated by new somnolence and hypercapneic respiratory failure. Primary team initiated infectious workup.    IMPRESSION  Substance-induced psychotic disorder  R/o bipolar disorder    RECOMMENDATION(S)      1. Scheduled Medication(s):  - Continue Depakote 125mg PO BID  - Recommend scheduled Risperdal m-tabs (disintegrating) 0.5 mg PO QHS for acute sujata  - Can give Zyprexa 1.25 mg IM if refusing PO Risperdal  - Agree with starting Melatonin 9 mg PO QHS  - Primary team discussed starting Trazodone - we feel that the risks of orthostatic hypotension outweigh the potential benefit, which is likely minimal in this acutely manic patient    2. PRN Medication(s):  - Continue Risperdal 0.5 mg PO q8hrs PRN non-redirectable agitation  - Continue PRN Zyprexa 2.5 mg PO or IM to 1.25 mg q8hrs PRN    3.  Monitor:  - Please obtain daily EKG to monitor QTc    4. Legal Status/Precaution(s):  - Patient does not meet criteria for PEC at this time. Patient does appear gravely disabled due to a psychiatric illness but is help-seeking and not contesting her admission. She may not have full capacity to refuse meds at this time given her altered mentation.  - Recommend fall precautions and strict bed rest with alarm on, low bed height, to avoid falls due to potential orthostatic hypotension secondary to Risperdal in this elderly patient.           Total time:  25 with greater than 50% of this time spent in counseling and/or coordination of care.       Krish Eden MD   Psychiatry  [facility] - Med Surg

## 2022-08-02 NOTE — PT/OT/SLP PROGRESS
"Speech Language Pathology Treatment    Patient Name:  Carmina Arcos   MRN:  [id number]  Admitting Diagnosis: Acute psychosis    Recommendations:                 General Recommendations:  monitor diet tolerance  Diet recommendations:  Mechanical soft, Liquid Diet Level: Thin   Aspiration Precautions: 1 bite/sip at a time, Alternating bites/sips, Assistance with meals, Avoid talking while eating, Eliminate distractions, Feed only when awake/alert, Frequent oral care, HOB to 90 degrees, Meds crushed in puree, Monitor for s/s of aspiration, Small bites/sips and Strict aspiration precautions   General Precautions: Standard, aspiration, fall  Communication strategies:  go to room if call light pushed    Subjective     Please, don't leave me!" pt very confused and became emotional when SLP concluded session. Pt's daughter, Pura, at bedside with pt.     Pain/Comfort:  · Pain Rating 1:  (did not rate pain, but would not allow SLP to elevate HOB to upright position due to discomfort)  · Pain Addressed 1: Reposition    Respiratory Status: Nasal cannula, flow 4 L/min SPO2 95%    Objective:     Has the patient been evaluated by SLP for swallowing?   Yes  Keep patient NPO? No   Current Respiratory Status:        Nurse gave clearance for SLP to see pt prior to entry. Nurse reported pt having difficulty swallowing PO meds whole with thin liquids, but tolerated meds crushed and buried in puree. SLP recommends continuing to administer medications in this manner.     Pt seen for follow up to check diet tolerance. Pt awake/alert and agreeable to participate in PO trials with encouragement, but did not wish to accept anything from breakfast tray. She also did not allow SLP to elevate HOB upright for PO intake.  Pt accepted straw sips of Coke x2 and water x 2. She accepted a small bite of pudding with crumbled brinda cracker mixed in x1.  Pt displayed coughing prior to swallow and continued to cough after the swallow.  SLP feels " decreased tolerance may have been related to suboptimal positioning.  Pt HOB elevated for subsequent thin liquid intake, but pt was not willing to accept further PO offerings of scrambled eggs or diced peaches. Education provided to pt and daughter regarding role of SLP, purpose of monitoring diet tolerance, and aspiration precautions. Pt did not demonstrate understanding and became very upset when SLP concluded session.  Pt left in room with daughter, Pura, at bedside.     Assessment:     Carmina Arcos is a 89 y.o. female with an SLP diagnosis of Dysphagia.      Goals:   Multidisciplinary Problems     SLP Goals        Problem: SLP    Goal Priority Disciplines Outcome   SLP Goal     SLP    Description: Goals expected to be met by 8/6:  1. Pt will tolerate least restrictive diet without overt s/sx airway threat.                          Plan:     · Patient to be seen:  3 x/week   · Plan of Care expires:  08/28/22  · Plan of Care reviewed with:  patient, daughter   · SLP Follow-Up:  Yes       Discharge recommendations:   (tbd)     Time Tracking:     SLP Treatment Date:   08/02/22  Speech Start Time:  1059  Speech Stop Time:  1110     Speech Total Time (min):  11 min    Billable Minutes: Treatment Swallowing Dysfunction 11    08/02/2022

## 2022-08-02 NOTE — MEDICAL/APP STUDENT
Hospital Course  Ms. Arcos was admitted on 7/24 for acute manic symptoms. She was started on a regimen of Seroquel QHS but then switched to Risperidone. There was moderate improvement in her psychiatric symptoms with addition of Depakote. She developed a fever on 7/29 and received a dose of ceftriaxone on 7/30 for concern of pneumonia in the setting of worsening hypoxia and cough. Workup including CXR, procalcitonin, and SARS-CoV-2 testing was unremarkable and ceftriaxone was discontinued. On 7/31, she developed increasing dyspnea and ABGs indicated a respiratory acidosis with CO2 retention. On 8/1, Risperidone was withheld for concerns over sedation. Azithromycin was prescribed for anti-inflammation in the setting of COPD. On 8/2, she continues to be dyspneic

## 2022-08-02 NOTE — PT/OT/SLP PROGRESS
Physical Therapy      Patient Name:  Carmina Arcos   MRN:  [id number]    Patient not seen today secondary to pt resting.Will follow-up next scheduled treatment per PT POC.

## 2022-08-02 NOTE — ASSESSMENT & PLAN NOTE
ASSESSMENT     Carmina Arcos is a 89 y.o. female with a past psychiatric history of SIPD, who [place] [facility]. Her symptoms of insomnia, hyperactivity, impulsivity, and delusions are likely 2/2 recent course of steroids. However, these symptoms are endangering her and have been difficult for her family to manage, especially due to the delay in seeing an outpatient provider. Pt's hospital course has been complicated by new somnolence and hypercapneic respiratory failure. Primary team initiated infectious workup.    IMPRESSION  Substance-induced psychotic disorder  R/o bipolar disorder    RECOMMENDATION(S)      1. Scheduled Medication(s):  - Continue Depakote 125mg PO BID  - Recommend scheduled Risperdal m-tabs (disintegrating) 0.5 mg PO QHS for acute sujata  - Can give Zyprexa 1.25 mg IM if refusing PO Risperdal  - Agree with starting Melatonin 9 mg PO QHS  - Primary team discussed starting Trazodone - we feel that the risks of orthostatic hypotension outweigh the potential benefit, which is likely minimal in this acutely manic patient    2. PRN Medication(s):  - Continue Risperdal 0.5 mg PO q8hrs PRN non-redirectable agitation  - Continue PRN Zyprexa 2.5 mg PO or IM to 1.25 mg q8hrs PRN    3.  Monitor:  - Please obtain daily EKG to monitor QTc    4. Legal Status/Precaution(s):  - Patient does not meet criteria for PEC at this time. Patient does appear gravely disabled due to a psychiatric illness but is help-seeking and not contesting her admission. She may not have full capacity to refuse meds at this time given her altered mentation.  - Recommend fall precautions and strict bed rest with alarm on, low bed height, to avoid falls due to potential orthostatic hypotension secondary to Risperdal in this elderly patient.

## 2022-08-02 NOTE — NURSING
1948 Pt in bed attempting to eat says she is SOB, resp even and unlabored, she is encouraged to take deep breaths through nose and out through mouth did several times then started speeding inspirations up as not following direction well told to just breath normal as pt not understanding breathing exercise , respiratory called will come give breathing treatment    2107 Doctor had to be paged as pt had IVPB order for Depakote and family stated was supposed to be changed to sprinkles as pt has no IV and they didn't want IV, changed and attempted to give sprinkles began refusing, so IV restart attempted x2 by CN unsuccessful, pt did not want IV and agreed to take sprinkles, took well, daughters and son at bedside , bed in low locked position, call light in reach, purewick in place    2240 Pt yelling and arguing with son over oxygen, noted O2 sats at 86% as she took N/C off and wouldn't let it be put back on until nurse entered room , reapplied and sats back to 93%    2356 Pt keeps removing oxygen son in room, pt uncooperative with him, takes lots of redirection to get oxygen replaced, grabbed nurses fingers and squeezed hard not wanting to let nurse go at first,oxygen reapplied, talked to son about medication options he was not in agreement with either wants her to rest , requested doctor be paged to discuss with him , oxygen dropped to 84 % on RA reapplied back up to 93%, med team 1 paged     0026 Doctor recalled stated give Risperdal, went in room as entering son put hand up to call nurse off, pt calmly talking to son and leaving oxygen on if she starts getting agitated again will re approach son with doctors recommendation    0233 Risperdal given took lots of encouragement and pt requested it be disolved in water, once dissolved she drank it    0505 Pt son requesting something for pain, prn tylenol taken in but pt refused the medicine

## 2022-08-02 NOTE — SUBJECTIVE & OBJECTIVE
"  Family History    None       Tobacco Use    Smoking status: Current Every Day Smoker     Types: Cigarettes    Smokeless tobacco: Never Used    Tobacco comment: smoked for 30-35 years per daughter quit many years ago   Substance and Sexual Activity    Alcohol use: Yes     Comment: socially less than one a month    Drug use: No    Sexual activity: Not Currently     Psychotherapeutics (From admission, onward)                Start     Stop Route Frequency Ordered    07/29/22 1727  OLANZapine injection 1.3 mg         -- IM Every 8 hours PRN 07/29/22 1727    07/27/22 1612  risperiDONE disintegrating tablet 0.5 mg         -- Oral Every 8 hours PRN 07/27/22 1512           Objective:     Vital Signs (Most Recent):  Temp: 97.9 °F (36.6 °C) (08/02/22 0753)  Pulse: 91 (08/02/22 0753)  Resp: 20 (08/02/22 0753)  BP: 135/70 (08/02/22 0753)  SpO2: 95 % (08/02/22 0753) Vital Signs (24h Range):  Temp:  [97.9 °F (36.6 °C)-98.3 °F (36.8 °C)] 97.9 °F (36.6 °C)  Pulse:  [] 91  Resp:  [16-20] 20  SpO2:  [93 %-97 %] 95 %  BP: (129-135)/(60-70) 135/70     Height: 5' 3" (160 cm)  Weight: 80.3 kg (177 lb)  Body mass index is 31.35 kg/m².    No intake or output data in the 24 hours ending 08/02/22 1048    Mental Status Exam:  Appearance: age appropriate, well nourished, casually dressed, lying in bed  Behavior/Cooperation: normal, cooperative  Speech: slurred, normal rate, normal pitch, normal volume  Mood: fine  Affect: expansive  Thought Process: loose associations  Thought Content: delusions: yes, grandiose and hyperreligious. No SI/HI/AVH.    Orientation: person, place, situation  Memory: Recent and remote intact  Attention Span/Concentration: Impaired  Insight: intact  Judgment: limited    Significant Labs: Last 24 Hours:   Recent Lab Results         08/01/22  1631        Appearance, UA Clear       Bilirubin (UA) Negative       Color, UA Yellow       Glucose, UA 1+       Ketones, UA Trace       Leukocytes, UA Negative       " NITRITE UA Negative       Occult Blood UA Negative       pH, UA 5.0       Protein, UA Negative  Comment: Recommend a 24 hour urine protein or a urine   protein/creatinine ratio if globulin induced proteinuria is  clinically suspected.         Specific Gravity, UA 1.020       Specimen UA Urine, Catheterized

## 2022-08-02 NOTE — PLAN OF CARE
Pt awaiting placement, removes oxygen throughout the night , required prn Risperdal to try to calm              Problem: Adult Inpatient Plan of Care  Goal: Plan of Care Review  Outcome: Ongoing, Progressing  Goal: Patient-Specific Goal (Individualized)  Outcome: Ongoing, Progressing  Goal: Absence of Hospital-Acquired Illness or Injury  Outcome: Ongoing, Progressing  Goal: Optimal Comfort and Wellbeing  Outcome: Ongoing, Progressing  Goal: Readiness for Transition of Care  Outcome: Ongoing, Progressing     Problem: Fall Injury Risk  Goal: Absence of Fall and Fall-Related Injury  Outcome: Ongoing, Progressing     Problem: Skin Injury Risk Increased  Goal: Skin Health and Integrity  Outcome: Ongoing, Progressing

## 2022-08-03 LAB
ALBUMIN SERPL BCP-MCNC: 2.8 G/DL (ref 3.5–5.2)
ALP SERPL-CCNC: 78 U/L (ref 55–135)
ALT SERPL W/O P-5'-P-CCNC: 11 U/L (ref 10–44)
ANION GAP SERPL CALC-SCNC: 8 MMOL/L (ref 8–16)
AST SERPL-CCNC: 13 U/L (ref 10–40)
BASOPHILS # BLD AUTO: 0.04 K/UL (ref 0–0.2)
BASOPHILS NFR BLD: 0.7 % (ref 0–1.9)
BILIRUB SERPL-MCNC: 0.5 MG/DL (ref 0.1–1)
BILIRUB UR QL STRIP: NEGATIVE
BUN SERPL-MCNC: 14 MG/DL (ref 10–30)
CALCIUM SERPL-MCNC: 9.2 MG/DL (ref 8.7–10.5)
CHLORIDE SERPL-SCNC: 101 MMOL/L (ref 95–110)
CLARITY UR REFRACT.AUTO: ABNORMAL
CO2 SERPL-SCNC: 36 MMOL/L (ref 23–29)
COLOR UR AUTO: ABNORMAL
CREAT SERPL-MCNC: 0.6 MG/DL (ref 0.5–1.4)
DIFFERENTIAL METHOD: ABNORMAL
EOSINOPHIL # BLD AUTO: 0.2 K/UL (ref 0–0.5)
EOSINOPHIL NFR BLD: 4 % (ref 0–8)
ERYTHROCYTE [DISTWIDTH] IN BLOOD BY AUTOMATED COUNT: 13.2 % (ref 11.5–14.5)
EST. GFR  (NO RACE VARIABLE): >60 ML/MIN/1.73 M^2
GLUCOSE SERPL-MCNC: 129 MG/DL (ref 70–110)
GLUCOSE UR QL STRIP: ABNORMAL
HCT VFR BLD AUTO: 39.6 % (ref 37–48.5)
HGB BLD-MCNC: 11.9 G/DL (ref 12–16)
HGB UR QL STRIP: NEGATIVE
IMM GRANULOCYTES # BLD AUTO: 0.02 K/UL (ref 0–0.04)
IMM GRANULOCYTES NFR BLD AUTO: 0.3 % (ref 0–0.5)
KETONES UR QL STRIP: ABNORMAL
LEUKOCYTE ESTERASE UR QL STRIP: NEGATIVE
LYMPHOCYTES # BLD AUTO: 0.8 K/UL (ref 1–4.8)
LYMPHOCYTES NFR BLD: 12.7 % (ref 18–48)
MAGNESIUM SERPL-MCNC: 2.4 MG/DL (ref 1.6–2.6)
MCH RBC QN AUTO: 27.5 PG (ref 27–31)
MCHC RBC AUTO-ENTMCNC: 30.1 G/DL (ref 32–36)
MCV RBC AUTO: 92 FL (ref 82–98)
MONOCYTES # BLD AUTO: 0.5 K/UL (ref 0.3–1)
MONOCYTES NFR BLD: 9 % (ref 4–15)
NEUTROPHILS # BLD AUTO: 4.4 K/UL (ref 1.8–7.7)
NEUTROPHILS NFR BLD: 73.3 % (ref 38–73)
NITRITE UR QL STRIP: NEGATIVE
NRBC BLD-RTO: 0 /100 WBC
PH UR STRIP: 5 [PH] (ref 5–8)
PHOSPHATE SERPL-MCNC: 3.9 MG/DL (ref 2.7–4.5)
PLATELET # BLD AUTO: 135 K/UL (ref 150–450)
PLATELET BLD QL SMEAR: ABNORMAL
PMV BLD AUTO: 11.1 FL (ref 9.2–12.9)
POTASSIUM SERPL-SCNC: 4.5 MMOL/L (ref 3.5–5.1)
PROT SERPL-MCNC: 6 G/DL (ref 6–8.4)
PROT UR QL STRIP: NEGATIVE
RBC # BLD AUTO: 4.32 M/UL (ref 4–5.4)
SODIUM SERPL-SCNC: 145 MMOL/L (ref 136–145)
SP GR UR STRIP: 1.03 (ref 1–1.03)
URN SPEC COLLECT METH UR: ABNORMAL
WBC # BLD AUTO: 6 K/UL (ref 3.9–12.7)

## 2022-08-03 PROCEDURE — 97110 THERAPEUTIC EXERCISES: CPT

## 2022-08-03 PROCEDURE — 25000003 PHARM REV CODE 250: Performed by: STUDENT IN AN ORGANIZED HEALTH CARE EDUCATION/TRAINING PROGRAM

## 2022-08-03 PROCEDURE — 25000003 PHARM REV CODE 250

## 2022-08-03 PROCEDURE — 99233 PR SUBSEQUENT HOSPITAL CARE,LEVL III: ICD-10-PCS | Mod: GC,,, | Performed by: STUDENT IN AN ORGANIZED HEALTH CARE EDUCATION/TRAINING PROGRAM

## 2022-08-03 PROCEDURE — 94660 CPAP INITIATION&MGMT: CPT

## 2022-08-03 PROCEDURE — 27000221 HC OXYGEN, UP TO 24 HOURS

## 2022-08-03 PROCEDURE — 36415 COLL VENOUS BLD VENIPUNCTURE: CPT | Performed by: STUDENT IN AN ORGANIZED HEALTH CARE EDUCATION/TRAINING PROGRAM

## 2022-08-03 PROCEDURE — 97535 SELF CARE MNGMENT TRAINING: CPT

## 2022-08-03 PROCEDURE — 99233 SBSQ HOSP IP/OBS HIGH 50: CPT | Mod: GC,,, | Performed by: STUDENT IN AN ORGANIZED HEALTH CARE EDUCATION/TRAINING PROGRAM

## 2022-08-03 PROCEDURE — 97530 THERAPEUTIC ACTIVITIES: CPT

## 2022-08-03 PROCEDURE — 94761 N-INVAS EAR/PLS OXIMETRY MLT: CPT

## 2022-08-03 PROCEDURE — 11000001 HC ACUTE MED/SURG PRIVATE ROOM

## 2022-08-03 PROCEDURE — 25000003 PHARM REV CODE 250: Performed by: HOSPITALIST

## 2022-08-03 PROCEDURE — 85025 COMPLETE CBC W/AUTO DIFF WBC: CPT | Performed by: STUDENT IN AN ORGANIZED HEALTH CARE EDUCATION/TRAINING PROGRAM

## 2022-08-03 PROCEDURE — 80053 COMPREHEN METABOLIC PANEL: CPT | Performed by: STUDENT IN AN ORGANIZED HEALTH CARE EDUCATION/TRAINING PROGRAM

## 2022-08-03 PROCEDURE — 99900035 HC TECH TIME PER 15 MIN (STAT)

## 2022-08-03 PROCEDURE — 81003 URINALYSIS AUTO W/O SCOPE: CPT

## 2022-08-03 PROCEDURE — 94799 UNLISTED PULMONARY SVC/PX: CPT

## 2022-08-03 PROCEDURE — 94640 AIRWAY INHALATION TREATMENT: CPT

## 2022-08-03 PROCEDURE — 97112 NEUROMUSCULAR REEDUCATION: CPT

## 2022-08-03 PROCEDURE — 63700000 PHARM REV CODE 250 ALT 637 W/O HCPCS: Performed by: STUDENT IN AN ORGANIZED HEALTH CARE EDUCATION/TRAINING PROGRAM

## 2022-08-03 PROCEDURE — 83735 ASSAY OF MAGNESIUM: CPT | Performed by: STUDENT IN AN ORGANIZED HEALTH CARE EDUCATION/TRAINING PROGRAM

## 2022-08-03 PROCEDURE — 63600175 PHARM REV CODE 636 W HCPCS: Performed by: STUDENT IN AN ORGANIZED HEALTH CARE EDUCATION/TRAINING PROGRAM

## 2022-08-03 PROCEDURE — 25000242 PHARM REV CODE 250 ALT 637 W/ HCPCS: Performed by: STUDENT IN AN ORGANIZED HEALTH CARE EDUCATION/TRAINING PROGRAM

## 2022-08-03 PROCEDURE — 92526 ORAL FUNCTION THERAPY: CPT

## 2022-08-03 PROCEDURE — 84100 ASSAY OF PHOSPHORUS: CPT | Performed by: STUDENT IN AN ORGANIZED HEALTH CARE EDUCATION/TRAINING PROGRAM

## 2022-08-03 RX ADMIN — DIVALPROEX SODIUM 125 MG: 125 CAPSULE, COATED PELLETS ORAL at 09:08

## 2022-08-03 RX ADMIN — HYPROMELLOSE 2910 1 DROP: 5 SOLUTION OPHTHALMIC at 09:08

## 2022-08-03 RX ADMIN — LOSARTAN POTASSIUM 100 MG: 50 TABLET, FILM COATED ORAL at 09:08

## 2022-08-03 RX ADMIN — HYPROMELLOSE 2910 1 DROP: 5 SOLUTION OPHTHALMIC at 03:08

## 2022-08-03 RX ADMIN — ALBUTEROL SULFATE 2 PUFF: 108 INHALANT RESPIRATORY (INHALATION) at 08:08

## 2022-08-03 RX ADMIN — RISPERIDONE 0.5 MG: 0.5 TABLET, ORALLY DISINTEGRATING ORAL at 08:08

## 2022-08-03 RX ADMIN — ENOXAPARIN SODIUM 40 MG: 40 INJECTION SUBCUTANEOUS at 05:08

## 2022-08-03 RX ADMIN — MICONAZOLE NITRATE: 20 CREAM TOPICAL at 09:08

## 2022-08-03 RX ADMIN — AZITHROMYCIN MONOHYDRATE 500 MG: 250 TABLET ORAL at 09:08

## 2022-08-03 RX ADMIN — DIVALPROEX SODIUM 125 MG: 125 CAPSULE, COATED PELLETS ORAL at 08:08

## 2022-08-03 RX ADMIN — ALBUTEROL SULFATE 2 PUFF: 108 INHALANT RESPIRATORY (INHALATION) at 01:08

## 2022-08-03 NOTE — PROGRESS NOTES
"[facility] Medicine  Progress Note    Patient Name: Carmina Arcos  MRN: [id number]  Patient Class: IP- Inpatient   Admission Date: 7/24/2022  Length of Stay: 1 days  Attending Physician: John Nguyen MD  Primary Care Provider: Dick Graham MD        Subjective:     Principal Problem:Acute psychosis        HPI:  History provided mostly by daughter Nimco. Ms Grewal is a 89 yo that  according to daughter after having a 3-4 weeks ago COPD exacerbation, was started on Prednisone by her son who is a nurse. States  that after a few days she started noticing behavioral changes described as flight of ideas, insomnia, rapid speech, alternating irritability with excitability, grandiose delusions auditory hallucinations, fixation with allegories, numerology and [employer].  Daughter also affirms, reckless spending. She was taken to [facility] but there was no psych service available. [place] [facility] [place].  States previous episode precipitated by "diet pills " in 1999 and had to be committed to [facility] for a week. There she was started on risperidone, its unclear for how long she was on it. Daughter states that the patient also has a history of depression with ne suicide attempt.    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 She currently was living on her own and managed rental property, her family lives close to her home and visit regularly.    PMH: Breast Cancer? Colon Cancer. HTN treated with losartan hydrochlorothiazide and amlodipine, Hypothyroidism treated with levothyroxine, COPD treated with with formoterol-glycopyrrolate , albuterol PRN and Sildenafil given by his son.    Surgical hx positive for bilateral knee replacement, tonsillectomy, hysterectomy, right lumpectomy and colon surgery .    Allergies: Penicillin      Overview/Hospital Course:  Admitted to hospital medicine for " "concerns of drug induced delirium vs sujata. Psychiatry consulted and recommended Risperdal. Patient still agitated the first night on Risperidal,Initiated Seroquel 50 mg PO QHS overnight on 7/25, but had poor sleep, frequently getting up from bed and becoming agitated  Labile affect with hyperreligious thoughts. Disoriented to time, place. Seroquel was siwtched to risperdal 0.5mg qhs on 7/26, continued to be hyperreligious but AOx4. Intermittently agitated overnight on 7/27 .7/28/22:She received scheduled Risperdal 1 mg PO scheduled and 0.5 mg PRN did not respond to the PRN medication and continued to be elevated, irritable, and not sleeping. This morning, pt was sleeping but rousable for interview. She continued to be irritable and hyperreligious, referring to her "visions" and sleeping when God wants her to. She was oriented x3 and denied complaints other than wanting to sleep and being tired of getting interrupted. Interview terminated due to pt's irritability.           Interval History:   Patient reported to refuse IV Depakote sprinkles were given and tolerated.     Review of Systems   Reason unable to perform ROS: asleep/somnolent.   Objective:     Vital Signs (Most Recent):  Temp: 98.6 °F (37 °C) (08/02/22 1619)  Pulse: 86 (08/02/22 1619)  Resp: 16 (08/02/22 1619)  BP: (!) 115/56 (08/02/22 1619)  SpO2: 99 % (08/02/22 1619)   Vital Signs (24h Range):  Temp:  [97.9 °F (36.6 °C)-98.6 °F (37 °C)] 98.6 °F (37 °C)  Pulse:  [] 86  Resp:  [16-20] 16  SpO2:  [93 %-99 %] 99 %  BP: (115-142)/(56-70) 115/56     Weight: 80.3 kg (177 lb)  Body mass index is 31.35 kg/m².  No intake or output data in the 24 hours ending 08/02/22 1859   Physical Exam  Constitutional:       Comments: In bed sleeping and somnolent when called   HENT:      Head: Normocephalic and atraumatic.      Mouth/Throat:      Dentition: Does not have dentures.   Cardiovascular:      Rate and Rhythm: Normal rate.      Pulses: Normal pulses.      Heart " sounds:     No friction rub.   Pulmonary:      Breath sounds: No wheezing or rhonchi.      Comments: Breathing rapid shallow breaths 27pm  Abdominal:      General: There is no distension.      Palpations: There is no mass.   Musculoskeletal:         General: No swelling or tenderness.   Lymphadenopathy:      Cervical: No cervical adenopathy.   Skin:     General: Skin is warm.   Neurological:      Mental Status: She is disoriented.       Significant Labs: All pertinent labs within the past 24 hours have been reviewed.  Recent Lab Results       None            Significant Imaging: I have reviewed all pertinent imaging results/findings within the past 24 hours.      Assessment/Plan:      * Acute psychosis  Ms. Arcos is a 89 yo that  according to daughter after having a 3-4 weeks ago COPD exacerbation, was started on Prednisone by her son who is a nurse. States  that after a few days she started noticing behavioral changes described as flight of ideas, insomnia, rapid speech, alternating irritability with excitability, grandiose delusions auditory hallucinations, fixation with allegories, numerology and [employer].     Differential includes but not limited to drug induced delirium like from recent steroids use vs manic episode from mood disorder. Unlikely to be 2/2 to infection given normal WBC and lacks of infectious symptoms. UA is also negative. UDS is also negative. TSH is normal, low likely moran for thyroid condition     - Continue to follow Psych recs   - Continue to monitor mood and mental status   - Delirium precautions   - Holding further steroids       Delirium        Manic behavior        COPD, severity to be determined    -Avoid Steroid nebulizer.  -patient has refused nebulization, agrees to do them today  -ventolin q4 prn      Mood disorder due to a general medical condition  See drug induced delirium     Sensorineural hearing loss (SNHL) of both ears        Dysphagia    Soft mechanical food in  diet    Hypertension  HCTZ 25 mg QD, Losartan 100 mg QD         Hx of breast cancer  S/p right lumpectomy and radiation       VTE Risk Mitigation (From admission, onward)         Ordered     enoxaparin injection 40 mg  Daily         07/24/22 1332     IP VTE HIGH RISK PATIENT  Once         07/24/22 1332     Place sequential compression device  Until discontinued         07/24/22 1332                Discharge Planning   ANA: 8/3/2022     Code Status: Full Code   Is the patient medically ready for discharge?: No    Reason for patient still in hospital (select all that apply): Patient trending condition  Discharge Plan A: Home Health   Discharge Delays: None known at this time              Gadiel Grajeda MD  Department of Hospital Medicine   [facility]

## 2022-08-03 NOTE — SUBJECTIVE & OBJECTIVE
"  Family History    None       Tobacco Use    Smoking status: Current Every Day Smoker     Types: Cigarettes    Smokeless tobacco: Never Used    Tobacco comment: smoked for 30-35 years per daughter quit many years ago   Substance and Sexual Activity    Alcohol use: Yes     Comment: socially less than one a month    Drug use: No    Sexual activity: Not Currently     Psychotherapeutics (From admission, onward)                Start     Stop Route Frequency Ordered    08/02/22 2230  risperiDONE disintegrating tablet 0.5 mg         -- Oral Nightly 08/02/22 2117 07/29/22 1727  OLANZapine injection 1.3 mg         -- IM Every 8 hours PRN 07/29/22 1727               Objective:     Vital Signs (Most Recent):  Temp: 98.4 °F (36.9 °C) (08/02/22 2100)  Pulse: 90 (08/03/22 0815)  Resp: 19 (08/03/22 0815)  BP: 136/80 (08/03/22 0400)  SpO2: (!) 92 % (08/03/22 0815)   Vital Signs (24h Range):  Temp:  [98.4 °F (36.9 °C)-98.6 °F (37 °C)] 98.4 °F (36.9 °C)  Pulse:  [] 90  Resp:  [14-29] 19  SpO2:  [92 %-99 %] 92 %  BP: (115-136)/(56-80) 136/80     Height: 5' 3" (160 cm)  Weight: 80.3 kg (177 lb)  Body mass index is 31.35 kg/m².      Intake/Output Summary (Last 24 hours) at 8/3/2022 1326  Last data filed at 8/3/2022 0300  Gross per 24 hour   Intake 100 ml   Output 750 ml   Net -650 ml     Mental Status Exam:  Appearance: age appropriate, well nourished, casually dressed, lying in bed  Behavior/Cooperation: normal, cooperative  Speech: slurred, normal rate, normal pitch, normal volume  Mood: good  Affect: constricted  Thought Process: loose associations  Thought Content: delusions: yes, grandiose and hyperreligious. No SI/HI/AVH.    Orientation: person, place, situation  Memory: Recent and remote intact  Attention Span/Concentration: Impaired  Insight: intact  Judgment: limited    Significant Labs: Last 24 Hours:   Recent Lab Results         08/03/22  1004   08/03/22  0346        Albumin   2.8       Alkaline Phosphatase   78    "    ALT   11       Anion Gap   8       Appearance, UA Hazy         AST   13       Baso #   0.04       Basophil %   0.7       Bilirubin (UA) Negative         BILIRUBIN TOTAL   0.5  Comment: For infants and newborns, interpretation of results should be based  on gestational age, weight and in agreement with clinical  observations.    Premature Infant recommended reference ranges:  Up to 24 hours.............<8.0 mg/dL  Up to 48 hours............<12.0 mg/dL  3-5 days..................<15.0 mg/dL  6-29 days.................<15.0 mg/dL         BUN   14       Calcium   9.2       Chloride   101       CO2   36       Color, UA Kathryn         Creatinine   0.6       Differential Method   Automated       eGFR   >60.0       Eos #   0.2       Eosinophil %   4.0       Glucose   129       Glucose, UA 2+         Gran # (ANC)   4.4       Gran %   73.3       Hematocrit   39.6       Hemoglobin   11.9       Immature Grans (Abs)   0.02  Comment: Mild elevation in immature granulocytes is non specific and   can be seen in a variety of conditions including stress response,   acute inflammation, trauma and pregnancy. Correlation with other   laboratory and clinical findings is essential.         Immature Granulocytes   0.3       Ketones, UA 1+         Leukocytes, UA Negative         Lymph #   0.8       Lymph %   12.7       Magnesium   2.4       MCH   27.5       MCHC   30.1       MCV   92       Mono #   0.5       Mono %   9.0       MPV   11.1       NITRITE UA Negative         nRBC   0       Occult Blood UA Negative         pH, UA 5.0         Phosphorus   3.9       Platelet Estimate   Appears normal       Platelets   135       Potassium   4.5       PROTEIN TOTAL   6.0       Protein, UA Negative  Comment: Recommend a 24 hour urine protein or a urine   protein/creatinine ratio if globulin induced proteinuria is  clinically suspected.           RBC   4.32       RDW   13.2       Sodium   145       Specific [place], UA 1.030         Specimen UA  Urine, Clean Catch         WBC   6.00

## 2022-08-03 NOTE — PT/OT/SLP PROGRESS
Speech Language Pathology Treatment    Patient Name:  Carmina Arcos   MRN:  [id number]  Admitting Diagnosis: Acute psychosis    Recommendations:                 General Recommendations:  monitor diet tolerance/ongoing swallowing assessment  Diet recommendations:  Mechanical soft, Liquid Diet Level: Thin   Aspiration Precautions: 1 bite/sip at a time, Alternating bites/sips, Assistance with meals, Avoid talking while eating, Eliminate distractions, Feed only when awake/alert, Frequent oral care, HOB to 90 degrees, Meds whole buried in puree, Monitor for s/s of aspiration, Remain upright 30 minutes post meal, Small bites/sips and Strict aspiration precautions   General Precautions: Standard, aspiration, fall  Communication strategies:  go to room if call light pushed    Subjective     Pt sitting at EOB with PT and OT while SLP assessed tolerance of PO. Daughter at bedside.     Pain/Comfort:  · Pain Rating 1: 0/10    Respiratory Status: Nasal cannula, flow 3 L/min    Objective:     Has the patient been evaluated by SLP for swallowing?   Yes  Keep patient NPO? No   Current Respiratory Status:        Pt's mental status and mood appearing much improved on this service date.  Pt sitting on EOB working with PT and OT and agreeable to receive PO trials to allow SLP to monitor diet tolerance. Pt received straw sips of milk and 2 bites of brinda cracker dipped in milk to soften.  Pt was able to clear oral cavity adequately.  Dry cough present x 1 after a delayed following all PO trials, but SLP unable to directly relate to PO due to length of delay.  Daughter explained that pt's partial dentures are present, but fit poorly.  SLP recommends pt remain on mechanical soft diet with thin liquids at this time.  Strict aspiration precautions should be followed.  SLP services to continue to monitor diet tolerance.     Assessment:     Carmina Arcos is a 89 y.o. female .  Pt may be at baseline for swallowing abilities. Recommend  continue mechanical soft diet with thin liquids and strict aspiration precautions. SLP services to continue to monitor diet tolerance.     Goals:   Multidisciplinary Problems     SLP Goals        Problem: SLP    Goal Priority Disciplines Outcome   SLP Goal     SLP    Description: Goals expected to be met by 8/6:  1. Pt will tolerate least restrictive diet without overt s/sx airway threat.                          Plan:     · Patient to be seen:  3 x/week   · Plan of Care expires:  08/28/22  · Plan of Care reviewed with:  patient, daughter   · SLP Follow-Up:  Yes       Discharge recommendations:   (tbd)     Time Tracking:     SLP Treatment Date:   08/03/22  Speech Start Time:  1333  Speech Stop Time:  1342     Speech Total Time (min):  9 min    Billable Minutes: Treatment Swallowing Dysfunction 9    08/03/2022

## 2022-08-03 NOTE — PROGRESS NOTES
"[facility] Surg  Psychiatry  Progress Note    Patient Name: Carmina Arcos  MRN: [id number]   Code Status: Full Code  Admission Date: 7/24/2022  Hospital Length of Stay: 2 days  Expected Discharge Date: 8/5/2022  Attending Physician: John Nguyen MD  Primary Care Provider: Dick Graham MD    Current Legal Status: Uncontested    Patient information was obtained from patient, relative(s) and ER records.       Subjective:     Patient is a 89 y.o., female, presents with:    Principal Problem:Acute psychosis    Chief Complaint: AMS    HPI: Carmina Arcos is a 89 y.o. female with a past psychiatric history of substance-induced psychotic disorder [place] [facility] [place]. Psychiatry was consulted for "sujata x several days, multiple ED visits".    Per Primary Team:  89 yo W with pmhx COPD, anxiety, depression, HTN, breast CA presents with family for psych evaluation.  History is assisted by the patient and the patient's daughter.  They note that patient was having coughing flares of COPD 3 weeks ago and she was started on course of prednisone by a family member.  She was on the prednisone for almost 2 weeks.  The prednisone has since discontinued that she continues to have manic symptoms including insomnia, reckless spending behavior, typical references Septra.  She was seen in the emergency department several days ago and discharged home with close observation by family members and outpatient psychiatry follow-up.  That follow-up is in 6 days.  Family states that she will need a sooner psychiatric evaluation.  She did have history of manic episode many years ago in the setting of taking a diet pill.  She was admitted inpatient time.  The patient does report some foul-smelling odor in her urine and is concerned that she has a kidney infection.  Otherwise she is essentially without complaints.     The history is provided by the patient and a relative. No  was used. " "    Per Psychiatry:  Patient was seen in the ED with daughter at bedside; pt and daughter provided history together. Pt was alert, oriented x4, rapid pressured interruptive speech, euthymic affect. They report about a week of psychiatric symptoms including excitability/irritability, anxiety, insomnia, hyperactivity, visual hallucinations, hyperreligious and grandiose delusions. Pt has been preoccupied with numerology and believes her family is part of a biblical story and that she has been in the [facility] with Lázaro. She has also been impulsively shopping and spending large amounts of money. She has intermittent confusion, FOI, BALA on exam.    Pt has a history of COPD and started a tapered 2-wk course of PO Prednisone for a presumed exacerbation, which she got from her son (a nurse and RT). Before the course was completed, the pt's psychiatric symptoms started as described above. Family was concerned that the Prednisone may be causing her symptoms but they could not convince her to stop taking it. They presented to the ED several times since then and were initially comfortable with outpatient follow-up with pt's former psychiatrist, Dr. Ceballos, on Friday 7/29. However, they have become increasingly concerned with pt's symptoms and feel she may be a danger to herself.     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 She speaks several languages and has no known history of cognitive decline. Her psychiatric history includes one substance-induced psychotic episode in 1999. She took some stimulant diet pills, developed insomnia and visual hallucinations, and was admitted to [place]. She was started on Risperdal and Zoloft, which she continued to see Dr. Ceballos for for some time. Eventually her symptoms resolved and she went back to her "relatively high-strung" baseline, per daughter. She has been out of Dr. Ceballos's care and not having " "psychiatric issues for the past 10+ years. Pt's daughter also reports a remote suicide attempt over 30 yrs ago, via Benadryl overdose.    Prior to the course of Prednisone, pt was at baseline with none of her current symptoms. She last took Prednisone on Wednesday 7/20 but the symptoms have persisted. Pt and family are amenable to inpt hospitalization for her current symptoms.    Psychiatric Review of Systems-is patient experiencing or having changes in  sleep: yes, insomnia  appetite: no  weight: no  energy/anergy: yes, hyperactive  interest/pleasure/anhedonia: no  somatic symptoms: no  libido: no  anxiety/panic: yes  guilty/hopelessness: no  concentration: yes  S.I.B.s/risky behavior: yes, impulsive shopping  any drugs: no  alcohol: no     Allergies:  Penicillins      Hospital Course:   Initiated Seroquel 50 mg PO QHS overnight on 7/25, but had poor sleep, frequently getting up from bed and becoming agitated regarding delusional thought content (per daughter and nursing). Labile affect with hyperreligious thoughts. Disoriented to time, place. Seroquel was siwtched to risperdal 0.5mg qhs on 7/26, continued to be hyperreligious but AOx4. Intermittently agitated overnight on 7/27, with incident during which she fell at 3AM as she frequently got out of bed without assistance. Became physically threatening with staff in the AM. Continued to have minimal sleep.     7/28/22:  Overnight, pt had another fall/weakness episode requiring assistance to return to bed from bathroom. She received scheduled Risperdal 1 mg PO scheduled and 0.5 mg PRN @ 0216 for agitation. Pt reportedly did not respond to the PRN medication and continued to be elevated, irritable, and not sleeping. This morning, pt was sleeping but rousable for interview. She continued to be irritable and hyperreligious, referring to her "visions" and sleeping when God wants her to. She was oriented x3 and denied complaints other than wanting to sleep and being " "tired of getting interrupted. Interview terminated due to pt's irritability.    7/29/22:  Patient was agitated yesterday, frequently screaming, crying, and calling family members to discuss Pentecostal delusions. She received PRN IM Zyprexa 2.5 mg yesterday afternoon and slept for several hours afterward. She received her first dose of QHS VPA yesterday and slept well throughout the night, per nursing. Pt was sleeping and difficult to arouse this morning but participated in brief psychiatric interview. She was oriented to time/place and endorsed good sleep overnight. Recalls recent events (daughter bringing baked goods to bedside). She reported she was still having her "visions" but said they were "allegory", not real. Interview terminated due to pt's drowsiness. Per nursing, pt was conversant/oriented this morning with son-in-law at bedside, both remarked how much calmer she was today than previous days.    7/31/22: Patient resting in bed with family at bedside, minimally interactive with eyes closed throughout most of interaction. Described the year as "2023" and when informed it was 2022, denied and stated that it was 2023. Perseverated on saying "they weren't telling the truth," and when asked who "they" were, she continuously stated "everyone." Reportedly not given risperdal and depakote d/t confusion and inability to swallow.    8/1/22:  Patient was frequently somnolent and intermittently unresponsive over the weekend, suspected to be 2/2 CO2 narcosis (confirmed via ABG yesterday, improved with Bipap). Per chart, she did not receive PO psych meds over the weekend due to difficulty swallowing and poor mentation. Her PO Depakote was switched to IV Depakon 125 BID. She was seen sleeping, upright in bed this morning, difficult to rouse and to keep alert/attentive. Frequently falling asleep and mumbling incoherently but also answering appropriately at times; oriented x4. Her family at bedside is concerned about her " "breathing as well as her ongoing irritability and refusal to cooperate with care at times.    8/2/22:  Slept poorly overnight per daughter in room and per nursing notes. Patient reports she slept fine. Not wearing O2 at this time. Says that if she wears it she will die. Cannot explain why - instead tells me to "fast forward, you'll find out." Oriented to year, month, place, self. Hurrying me along - "Go ahead ask another question doctor" - "Can you hurry and ask your questions?" Daughter says she is rushing me because then she doesn't have to try and organize her thoughts.     8/3/22:  Slept a bit better overnight with risperdal. Received 0.5mg dose. Daughter in room states patient is looking better today. Walked to the bathroom with assistance today. States she wants to go home. Minimal participation in interview at this time. Trying to catch up on sleep. Does know that grass requires "water and sun" to grow, oriented with prompting. Mood is good.      Family History    None       Tobacco Use    Smoking status: Current Every Day Smoker     Types: Cigarettes    Smokeless tobacco: Never Used    Tobacco comment: smoked for 30-35 years per daughter quit many years ago   Substance and Sexual Activity    Alcohol use: Yes     Comment: socially less than one a month    Drug use: No    Sexual activity: Not Currently     Psychotherapeutics (From admission, onward)                Start     Stop Route Frequency Ordered    08/02/22 2230  risperiDONE disintegrating tablet 0.5 mg         -- Oral Nightly 08/02/22 2117 07/29/22 1727  OLANZapine injection 1.3 mg         -- IM Every 8 hours PRN 07/29/22 1727               Objective:     Vital Signs (Most Recent):  Temp: 98.4 °F (36.9 °C) (08/02/22 2100)  Pulse: 90 (08/03/22 0815)  Resp: 19 (08/03/22 0815)  BP: 136/80 (08/03/22 0400)  SpO2: (!) 92 % (08/03/22 0815)   Vital Signs (24h Range):  Temp:  [98.4 °F (36.9 °C)-98.6 °F (37 °C)] 98.4 °F (36.9 °C)  Pulse:  [] " "90  Resp:  [14-29] 19  SpO2:  [92 %-99 %] 92 %  BP: (115-136)/(56-80) 136/80     Height: 5' 3" (160 cm)  Weight: 80.3 kg (177 lb)  Body mass index is 31.35 kg/m².      Intake/Output Summary (Last 24 hours) at 8/3/2022 1326  Last data filed at 8/3/2022 0300  Gross per 24 hour   Intake 100 ml   Output 750 ml   Net -650 ml     Mental Status Exam:  Appearance: age appropriate, well nourished, casually dressed, lying in bed  Behavior/Cooperation: normal, cooperative  Speech: slurred, normal rate, normal pitch, normal volume  Mood: good  Affect: constricted  Thought Process: loose associations  Thought Content: delusions: yes, grandiose and hyperreligious. No SI/HI/AVH.    Orientation: person, place, situation  Memory: Recent and remote intact  Attention Span/Concentration: Impaired  Insight: intact  Judgment: limited    Significant Labs: Last 24 Hours:   Recent Lab Results         08/03/22  1004   08/03/22  0346        Albumin   2.8       Alkaline Phosphatase   78       ALT   11       Anion Gap   8       Appearance, UA Hazy         AST   13       Baso #   0.04       Basophil %   0.7       Bilirubin (UA) Negative         BILIRUBIN TOTAL   0.5  Comment: For infants and newborns, interpretation of results should be based  on gestational age, weight and in agreement with clinical  observations.    Premature Infant recommended reference ranges:  Up to 24 hours.............<8.0 mg/dL  Up to 48 hours............<12.0 mg/dL  3-5 days..................<15.0 mg/dL  6-29 days.................<15.0 mg/dL         BUN   14       Calcium   9.2       Chloride   101       CO2   36       Color, UA Kathryn         Creatinine   0.6       Differential Method   Automated       eGFR   >60.0       Eos #   0.2       Eosinophil %   4.0       Glucose   129       Glucose, UA 2+         Gran # (ANC)   4.4       Gran %   73.3       Hematocrit   39.6       Hemoglobin   11.9       Immature Grans (Abs)   0.02  Comment: Mild elevation in immature " granulocytes is non specific and   can be seen in a variety of conditions including stress response,   acute inflammation, trauma and pregnancy. Correlation with other   laboratory and clinical findings is essential.         Immature Granulocytes   0.3       Ketones, UA 1+         Leukocytes, UA Negative         Lymph #   0.8       Lymph %   12.7       Magnesium   2.4       MCH   27.5       MCHC   30.1       MCV   92       Mono #   0.5       Mono %   9.0       MPV   11.1       NITRITE UA Negative         nRBC   0       Occult Blood UA Negative         pH, UA 5.0         Phosphorus   3.9       Platelet Estimate   Appears normal       Platelets   135       Potassium   4.5       PROTEIN TOTAL   6.0       Protein, UA Negative  Comment: Recommend a 24 hour urine protein or a urine   protein/creatinine ratio if globulin induced proteinuria is  clinically suspected.           RBC   4.32       RDW   13.2       Sodium   145       Specific [place], UA 1.030         Specimen UA Urine, Clean Catch         WBC   6.00                Scheduled Medications:   albuterol  2 puff Inhalation Q6H WAKE    artificial tears  1 drop Both Eyes TID    divalproex  125 mg Oral Q12H    enoxaparin  40 mg Subcutaneous Daily    levothyroxine  50 mcg Oral Before breakfast    losartan  100 mg Oral Daily    miconazole   Topical (Top) BID    oxymetazoline  2 spray Each Nostril BID    risperiDONE  0.5 mg Oral QHS       PRN Medications:  acetaminophen, dextrose 10%, dextrose 10%, glucagon (human recombinant), glucose, glucose, naloxone, OLANZapine, sodium chloride 0.9%    Review of patient's allergies indicates:   Allergen Reactions    Penicillins Hives and Shortness Of Breath       Assessment/Plan:     * Acute psychosis  ASSESSMENT     Carmina Arcos is a 89 y.o. female with a past psychiatric history of SIPD, who presented to the [facility] [place] due to manic behavior. Her symptoms of insomnia, hyperactivity, impulsivity, and delusions are likely 2/2  recent course of steroids. However, these symptoms are endangering her and have been difficult for her family to manage, especially due to the delay in seeing an outpatient provider. Pt's hospital course has been complicated by new somnolence and hypercapneic respiratory failure. Primary team initiated infectious workup.    IMPRESSION  Substance-induced psychotic disorder  R/o bipolar disorder    RECOMMENDATION(S)      1. Scheduled Medication(s):  - Continue Depakote 125mg PO BID  - Continue scheduled Risperdal m-tabs (disintegrating) 0.5 mg PO QHS for acute sujata  - Can give Zyprexa 1.25 mg IM if refusing PO Risperdal  - Agree with starting Melatonin 9 mg PO QHS  - Primary team discussed starting Trazodone - we feel that the risks of orthostatic hypotension outweigh the potential benefit, which is likely minimal in this acutely manic patient    2. PRN Medication(s):  - Continue Risperdal 0.5 mg PO q8hrs PRN non-redirectable agitation  - Continue PRN Zyprexa 2.5 mg PO or IM to 1.25 mg q8hrs PRN    3.  Monitor:  - Please obtain EKG to monitor QTc    4. Legal Status/Precaution(s):  - Patient does not meet criteria for PEC at this time. Patient does appear gravely disabled due to a psychiatric illness but is help-seeking and not contesting her admission. She may not have full capacity to refuse meds at this time given her altered mentation.  - Recommend fall precautions and strict bed rest with alarm on, low bed height, to avoid falls due to potential orthostatic hypotension secondary to Risperdal in this elderly patient.         Total time:  25 with greater than 50% of this time spent in counseling and/or coordination of care.       Krish Eden MD   Psychiatry PGY-4  [facility] - Med Surg

## 2022-08-03 NOTE — PT/OT/SLP PROGRESS
Physical Therapy   Progress Note    Patient Name:  Carmina Arcos  MRN: [id number]    Admit Date: 7/24/2022  Admitting Diagnosis:  Acute psychosis  Length of Stay: 2 days  Recent Surgery: * No surgery found *      Recommendations:     Discharge Recommendations: Skilled Nursing Facility   Equipment recommendations: rolling walker and bedside commode  Barriers to discharge: Increased level of skilled assistance required and Fall risk     Assessment:     Carmina Arcos is a 89 y.o. female admitted to [facility] [place] on 7/24/2022 with medical diagnosis of Acute psychosis. Pt presents with weakness, impaired endurance, impaired self care skills, impaired functional mobility, gait instability, impaired balance, impaired cognition, decreased safety awareness, decreased coordination, impaired coordination, edema. Pt is progressing towards goals, but has not yet reached prior level of function. Pt is intermittently alert during today's session. Increased assistance required to sit eob. Assisted pt into sitting eob for SLP session. Pt unable to take steps today due to increased posterior lean and decreased ability to stay awake/alert during session. Carmina Arcos would benefit from continued acute PT intervention to improve quality of life, focus on recovery of impairments, provide patient/caregiver education, reduce fall risk, and maximize (I) and safety with functional mobility. Once medically stable, recommending pt discharge to Skilled Nursing Facility .      Rehab Prognosis: Fair    Plan:     During this hospitalization, patient to be seen 3 x/week to address the identified rehab impairments via gait training, therapeutic activities, therapeutic exercises, neuromuscular re-education and progress towards stated goals.     Plan of Care Expires:  08/25/22  Plan of Care reviewed with: patient; daughter    This plan of care has been discussed with the patient/caregiver, who was included in its development and is in agreement with  "the identified goals and treatment plan.     Subjective     Communicated with RN prior to session.  Patient found supine upon PT entry to room, agreeable to therapy session. Pt's daughter present during session.    Patient/Family Comments/goals: "We want her to get up and walk. She is getting weak in this bed"    Pain/Comfort:  · Pain Rating 1: 0/10  · Pain Rating Post-Intervention 1: 0/10    Patients cultural, spiritual, Scientologist conflicts given the current situation: None identified     Objective:   OT present for cotreat due to pt's multiple medical comorbidities and functional/cognition deficits requiring two skilled therapists to appropriately progress pt's musculoskeletal strength, neuromuscular control, and endurance while taking into consideration medical acuity and pt safety.    Patient found with: PureWick, oxygen (avasys; visi)    General Precautions: Standard, fall, aspiration   Orthopedic Precautions:N/A   Braces: N/A   Oxygen Device: nasal cannula 3L    Cognition:   Pt is intermittently Alert during session.    Therapist provided skilled verbal and tactile cueing to facilitate the following functional mobility tasks. Listed tasks are focused on recovery of impairments and improving pt's independence and efficiency with bed mobility, transfers and ambulation as able.     Bed Mobility:  · Supine > Sit: Moderate Assistancex2  · Sit > Supine: Moderate Assistancex2    Transfers:   · Sit <> Stand Transfer: Moderate Assistancex2 from eob with RW x3 trials  · Stood for ~2 min each trial  · Max cueing to keep eyes open and to stand upright; increased posterior lean                 Gait:  · Deferred due to poor static standing balance    Balance:  · Dynamic Sitting: FAIR+: Maintains balance through MINIMAL excursions of active trunk motion   · Momo-CGA sitting eob  · Standing:  · Static: POOR: Needs MODERATE assist to maintain   · Dynamic: 0: N/A    Outcome Measure: AM-PAC 6 CLICK MOBILITY  Total " Score:10     Patient/Caregiver Education and Additional Therapeutic Activities/Exercises   Educated provided re: discharge planning options and increased level of care patient currently requires    Provided pt/caregiver education regarding:    PT POC and goals for therapy    Safety with mobility and fall risk    Safe management of AD as needed    Energy conservation techniques    Instruction on use of call button and importance of calling nursing staff for assistance with mobility     Patient/caregiver able to verbalize understanding; will follow-up with pt/caregiver during current admit for additional questions/concerns within scope of practice.     White board updated.     Patient left with bed in chair position with all lines intact, call button in reach, bed alarm on and daughter present.    Goals:     Multidisciplinary Problems     Physical Therapy Goals        Problem: Physical Therapy    Goal Priority Disciplines Outcome Goal Variances Interventions   Physical Therapy Goal     PT, PT/OT Ongoing, Progressing     Description: Goals to be met by: 2022     Patient will increase functional independence with mobility by performin. Supine to sit with Modified [place].- not met  2. Sit to supine with Modified [place]. - not met  3. Sit to stand transfer with Modified [place] using Single- point Cane. - not met  4. Bed to chair transfer with Modified [place] using Single-point Cane   - not met  5. Gait  x 200 feet with Modified [place] using Single-point Cane . - not met                     Time Tracking:       PT Received On: 22  PT Start Time: 1332     PT Stop Time: 1416  PT Total Time (min): 44 min     Billable Minutes: Therapeutic Activity 30 and Neuromuscular Re-education 14    2022

## 2022-08-03 NOTE — SUBJECTIVE & OBJECTIVE
Interval History:     Patient  slept slightly , overnight with risperdal. Patient is looking better today. Walked to the bathroom with assistance today. States she wants to go home. Plan on home health per family with follow up with pulmonology  and sleep study      Review of Systems   Constitutional:  Negative for activity change, appetite change and fever.   HENT:  Positive for dental problem.    Eyes:  Negative for discharge and itching.   Respiratory:  Negative for cough.    Cardiovascular:  Negative for chest pain and leg swelling.   Gastrointestinal:  Negative for abdominal distention, abdominal pain and vomiting.   Musculoskeletal:  Negative for arthralgias and myalgias.   Skin:  Negative for pallor.   Neurological:  Positive for weakness. Negative for tremors and facial asymmetry.   Psychiatric/Behavioral:  Positive for decreased concentration. Negative for agitation. The patient is not nervous/anxious.    Objective:     Vital Signs (Most Recent):  Temp: 98.4 °F (36.9 °C) (08/02/22 2100)  Pulse: 89 (08/03/22 1330)  Resp: 19 (08/03/22 1330)  BP: 136/80 (08/03/22 0400)  SpO2: (!) 92 % (08/03/22 0815)   Vital Signs (24h Range):  Temp:  [98.4 °F (36.9 °C)-98.6 °F (37 °C)] 98.4 °F (36.9 °C)  Pulse:  [] 89  Resp:  [14-29] 19  SpO2:  [92 %-99 %] 92 %  BP: (115-136)/(56-80) 136/80     Weight: 80.3 kg (177 lb)  Body mass index is 31.35 kg/m².    Intake/Output Summary (Last 24 hours) at 8/3/2022 1556  Last data filed at 8/3/2022 0300  Gross per 24 hour   Intake 100 ml   Output 750 ml   Net -650 ml      Physical Exam  Constitutional:       General: She is not in acute distress.     Comments: In bed sleeping and somnolent when called   HENT:      Head: Normocephalic and atraumatic.      Nose: No congestion or rhinorrhea.      Mouth/Throat:      Mouth: Mucous membranes are dry.      Dentition: Does not have dentures.   Cardiovascular:      Rate and Rhythm: Normal rate.      Pulses: Normal pulses.      Heart sounds:      No friction rub.   Pulmonary:      Breath sounds: No wheezing or rhonchi.      Comments: Breathing rapid shallow breaths 27pm  Abdominal:      General: There is no distension.      Palpations: There is no mass.   Musculoskeletal:         General: No swelling or tenderness.   Lymphadenopathy:      Cervical: No cervical adenopathy.   Skin:     General: Skin is warm.   Neurological:      Mental Status: She is disoriented.      Motor: Weakness present.       Significant Labs: All pertinent labs within the past 24 hours have been reviewed.  Recent Lab Results         08/03/22  1004   08/03/22  0346        Albumin   2.8       Alkaline Phosphatase   78       ALT   11       Anion Gap   8       Appearance, UA Hazy         AST   13       Baso #   0.04       Basophil %   0.7       Bilirubin (UA) Negative         BILIRUBIN TOTAL   0.5  Comment: For infants and newborns, interpretation of results should be based  on gestational age, weight and in agreement with clinical  observations.    Premature Infant recommended reference ranges:  Up to 24 hours.............<8.0 mg/dL  Up to 48 hours............<12.0 mg/dL  3-5 days..................<15.0 mg/dL  6-29 days.................<15.0 mg/dL         BUN   14       Calcium   9.2       Chloride   101       CO2   36       Color, UA Kathryn         Creatinine   0.6       Differential Method   Automated       eGFR   >60.0       Eos #   0.2       Eosinophil %   4.0       Glucose   129       Glucose, UA 2+         Gran # (ANC)   4.4       Gran %   73.3       Hematocrit   39.6       Hemoglobin   11.9       Immature Grans (Abs)   0.02  Comment: Mild elevation in immature granulocytes is non specific and   can be seen in a variety of conditions including stress response,   acute inflammation, trauma and pregnancy. Correlation with other   laboratory and clinical findings is essential.         Immature Granulocytes   0.3       Ketones, UA 1+         Leukocytes, UA Negative         Lymph #    0.8       Lymph %   12.7       Magnesium   2.4       MCH   27.5       MCHC   30.1       MCV   92       Mono #   0.5       Mono %   9.0       MPV   11.1       NITRITE UA Negative         nRBC   0       Occult Blood UA Negative         pH, UA 5.0         Phosphorus   3.9       Platelet Estimate   Appears normal       Platelets   135       Potassium   4.5       PROTEIN TOTAL   6.0       Protein, UA Negative  Comment: Recommend a 24 hour urine protein or a urine   protein/creatinine ratio if globulin induced proteinuria is  clinically suspected.           RBC   4.32       RDW   13.2       Sodium   145       Specific [place], UA 1.030         Specimen UA Urine, Clean Catch         WBC   6.00               Significant Imaging: I have reviewed all pertinent imaging results/findings within the past 24 hours.

## 2022-08-03 NOTE — ASSESSMENT & PLAN NOTE
Ms. Arcos is a 89 yo that  according to daughter after having a 3-4 weeks ago COPD exacerbation, was started on Prednisone by her son who is a nurse. States  that after a few days she started noticing behavioral changes described as flight of ideas, insomnia, rapid speech, alternating irritability with excitability, grandiose delusions auditory hallucinations, fixation with allegories, numerology and Evangelical.     Differential includes but not limited to drug induced delirium like from recent steroids use vs manic episode from mood disorder. Unlikely to be 2/2 to infection given normal WBC and lacks of infectious symptoms. UA is also negative. UDS is also negative. TSH is normal, low likely moran for thyroid condition     - Continue to follow Psych recs   - Continue to monitor mood and mental status   - Delirium precautions   - Holding further steroids     Scheduled Medication(s):  - Continue Depakote 125mg PO BID  - Continue scheduled Risperdal m-tabs (disintegrating) 0.5 mg PO QHS for acute sujata

## 2022-08-03 NOTE — PROGRESS NOTES
"[facility] Medicine  Progress Note    Patient Name: Carmina Arcos  MRN: [id number]  Patient Class: IP- Inpatient   Admission Date: 7/24/2022  Length of Stay: 2 days  Attending Physician: John Nguyen MD  Primary Care Provider: Dick Graham MD        Subjective:     Principal Problem:Acute psychosis        HPI:  History provided mostly by daughter Nimco. Ms Grewal is a 89 yo that  according to daughter after having a 3-4 weeks ago COPD exacerbation, was started on Prednisone by her son who is a nurse. States  that after a few days she started noticing behavioral changes described as flight of ideas, insomnia, rapid speech, alternating irritability with excitability, grandiose delusions auditory hallucinations, fixation with allegories, numerology and Pentecostalism.  Daughter also affirms, reckless spending. She was taken to [facility] but there was no psych service available. They decided to come to [facility] for psychiatric evaluation.  States previous episode precipitated by "diet pills " in 1999 and had to be committed [facility] a [facility]. There she was started on risperidone, its unclear for how long she was on it. Daughter states that the patient also has a history of depression with ne suicide attempt.    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 She currently was living on her own and managed rental property, her family lives close to her home and visit regularly.    PMH: Breast Cancer? Colon Cancer. HTN treated with losartan hydrochlorothiazide and amlodipine, Hypothyroidism treated with levothyroxine, COPD treated with with formoterol-glycopyrrolate , albuterol PRN and Sildenafil given by his son.    Surgical hx positive for bilateral knee replacement, tonsillectomy, hysterectomy, right lumpectomy and colon surgery .    Allergies: Penicillin      Overview/Hospital Course:    Ms. Arcos was admitted on 7/24 " for acute manic symptoms. She was started on a regimen of Seroquel QHS but then switched to Risperidone. There was moderate improvement in her psychiatric symptoms with addition of Depakote. She developed a fever on 7/29 and received a dose of ceftriaxone on 7/30 for concern of pneumonia in the setting of worsening hypoxia and cough. Workup including CXR, procalcitonin, and SARS-CoV-2 testing was unremarkable and ceftriaxone was discontinued. On 7/31, she developed increasing dyspnea and ABGs indicated a respiratory acidosis with CO2 retention. On 8/1, Risperidone was withheld for concerns over sedation. Azithromycin was prescribed for anti-inflammation in the setting of COPD. On 8/2, she continues to be dyspneic      Interval History:     Patient  slept slightly , overnight with risperdal. Patient is looking better today. Walked to the bathroom with assistance today. States she wants to go home. Plan on home health per family with follow up with pulmonology  and sleep study      Review of Systems   Constitutional:  Negative for activity change, appetite change and fever.   HENT:  Positive for dental problem.    Eyes:  Negative for discharge and itching.   Respiratory:  Negative for cough.    Cardiovascular:  Negative for chest pain and leg swelling.   Gastrointestinal:  Negative for abdominal distention, abdominal pain and vomiting.   Musculoskeletal:  Negative for arthralgias and myalgias.   Skin:  Negative for pallor.   Neurological:  Positive for weakness. Negative for tremors and facial asymmetry.   Psychiatric/Behavioral:  Positive for decreased concentration. Negative for agitation. The patient is not nervous/anxious.    Objective:     Vital Signs (Most Recent):  Temp: 98.4 °F (36.9 °C) (08/02/22 2100)  Pulse: 89 (08/03/22 1330)  Resp: 19 (08/03/22 1330)  BP: 136/80 (08/03/22 0400)  SpO2: (!) 92 % (08/03/22 0815)   Vital Signs (24h Range):  Temp:  [98.4 °F (36.9 °C)-98.6 °F (37 °C)] 98.4 °F (36.9 °C)  Pulse:   [] 89  Resp:  [14-29] 19  SpO2:  [92 %-99 %] 92 %  BP: (115-136)/(56-80) 136/80     Weight: 80.3 kg (177 lb)  Body mass index is 31.35 kg/m².    Intake/Output Summary (Last 24 hours) at 8/3/2022 1556  Last data filed at 8/3/2022 0300  Gross per 24 hour   Intake 100 ml   Output 750 ml   Net -650 ml      Physical Exam  Constitutional:       General: She is not in acute distress.     Comments: In bed sleeping and somnolent when called   HENT:      Head: Normocephalic and atraumatic.      Nose: No congestion or rhinorrhea.      Mouth/Throat:      Mouth: Mucous membranes are dry.      Dentition: Does not have dentures.   Cardiovascular:      Rate and Rhythm: Normal rate.      Pulses: Normal pulses.      Heart sounds:     No friction rub.   Pulmonary:      Breath sounds: No wheezing or rhonchi.      Comments: Breathing rapid shallow breaths 27pm  Abdominal:      General: There is no distension.      Palpations: There is no mass.   Musculoskeletal:         General: No swelling or tenderness.   Lymphadenopathy:      Cervical: No cervical adenopathy.   Skin:     General: Skin is warm.   Neurological:      Mental Status: She is disoriented.      Motor: Weakness present.       Significant Labs: All pertinent labs within the past 24 hours have been reviewed.  Recent Lab Results         08/03/22  1004   08/03/22  0346        Albumin   2.8       Alkaline Phosphatase   78       ALT   11       Anion Gap   8       Appearance, UA Hazy         AST   13       Baso #   0.04       Basophil %   0.7       Bilirubin (UA) Negative         BILIRUBIN TOTAL   0.5  Comment: For infants and newborns, interpretation of results should be based  on gestational age, weight and in agreement with clinical  observations.    Premature Infant recommended reference ranges:  Up to 24 hours.............<8.0 mg/dL  Up to 48 hours............<12.0 mg/dL  3-5 days..................<15.0 mg/dL  6-29 days.................<15.0 mg/dL         BUN   14        Calcium   9.2       Chloride   101       CO2   36       Color, UA Kathryn         Creatinine   0.6       Differential Method   Automated       eGFR   >60.0       Eos #   0.2       Eosinophil %   4.0       Glucose   129       Glucose, UA 2+         Gran # (ANC)   4.4       Gran %   73.3       Hematocrit   39.6       Hemoglobin   11.9       Immature Grans (Abs)   0.02  Comment: Mild elevation in immature granulocytes is non specific and   can be seen in a variety of conditions including stress response,   acute inflammation, trauma and pregnancy. Correlation with other   laboratory and clinical findings is essential.         Immature Granulocytes   0.3       Ketones, UA 1+         Leukocytes, UA Negative         Lymph #   0.8       Lymph %   12.7       Magnesium   2.4       MCH   27.5       MCHC   30.1       MCV   92       Mono #   0.5       Mono %   9.0       MPV   11.1       NITRITE UA Negative         nRBC   0       Occult Blood UA Negative         pH, UA 5.0         Phosphorus   3.9       Platelet Estimate   Appears normal       Platelets   135       Potassium   4.5       PROTEIN TOTAL   6.0       Protein, UA Negative  Comment: Recommend a 24 hour urine protein or a urine   protein/creatinine ratio if globulin induced proteinuria is  clinically suspected.           RBC   4.32       RDW   13.2       Sodium   145       Specific [place], UA 1.030         Specimen UA Urine, Clean Catch         WBC   6.00               Significant Imaging: I have reviewed all pertinent imaging results/findings within the past 24 hours.      Assessment/Plan:      * Acute psychosis  Ms. Arcos is a 89 yo that  according to daughter after having a 3-4 weeks ago COPD exacerbation, was started on Prednisone by her son who is a nurse. States  that after a few days she started noticing behavioral changes described as flight of ideas, insomnia, rapid speech, alternating irritability with excitability, grandiose delusions auditory  hallucinations, fixation with allegories, numerology and Christian.     Differential includes but not limited to drug induced delirium like from recent steroids use vs manic episode from mood disorder. Unlikely to be 2/2 to infection given normal WBC and lacks of infectious symptoms. UA is also negative. UDS is also negative. TSH is normal, low likely moran for thyroid condition     - Continue to follow Psych recs   - Continue to monitor mood and mental status   - Delirium precautions   - Holding further steroids     Scheduled Medication(s):  - Continue Depakote 125mg PO BID  - Continue scheduled Risperdal m-tabs (disintegrating) 0.5 mg PO QHS for acute sujata           Delirium        Manic behavior        COPD, severity to be determined    -Avoid Steroid nebulizer.  -patient has refused nebulization, agrees to do them today  -ventolin q4 prn      Mood disorder due to a general medical condition  See acute psychosis    Sensorineural hearing loss (SNHL) of both ears        Dysphagia    Soft mechanical food in diet    Hypertension  HCTZ 25 mg QD, Losartan 100 mg QD         Hx of breast cancer  S/p right lumpectomy and radiation       VTE Risk Mitigation (From admission, onward)         Ordered     enoxaparin injection 40 mg  Daily         07/24/22 1332     IP VTE HIGH RISK PATIENT  Once         07/24/22 1332     Place sequential compression device  Until discontinued         07/24/22 1332                Discharge Planning   ANA: 8/5/2022     Code Status: Full Code   Is the patient medically ready for discharge?: No    Reason for patient still in hospital (select all that apply): Patient trending condition  Discharge Plan A: Home Health   Discharge Delays: None known at this time              Gadiel Grajeda MD  Department of Hospital Medicine   [facility]

## 2022-08-03 NOTE — ASSESSMENT & PLAN NOTE
-Avoid Steroid nebulizer.  -patient has refused nebulization, agrees to do them today  -ventolin q4 prn

## 2022-08-03 NOTE — PT/OT/SLP PROGRESS
Occupational Therapy   Treatment    Name: Carmina Arcos  MRN: [id number]  Admitting Diagnosis:  Acute psychosis       Recommendations:     Discharge Recommendations: nursing facility, skilled  Discharge Equipment Recommendations:  bedside commode, walker, rolling  Barriers to discharge:  None    Assessment:     Carmina Arcos is a 89 y.o. female with a medical diagnosis of Acute psychosis.  Performance deficits affecting function are weakness, impaired endurance, impaired self care skills, impaired functional mobility, gait instability, impaired balance, decreased safety awareness, impaired cognition, decreased upper extremity function, decreased lower extremity function. Patient demonstrates increased weakness and lethargic during session. At this time, patient is not safe to go home alone and recommending SNF once medically appropriate for discharge to increase maximal independence, reduce burden of care, and ensure safety. Patient would benefit from continued skilled acute OT 3x/wk to improve functional mobility, increase independence with ADLs, and address established goals.    Rehab Prognosis:  Good; patient would benefit from acute skilled OT services to address these deficits and reach maximum level of function.       Plan:     Patient to be seen 3 x/week to address the above listed problems via self-care/home management, therapeutic activities, therapeutic exercises  · Plan of Care Expires: 08/26/22  · Plan of Care Reviewed with: patient, daughter    Subjective     Pain/Comfort:  · Pain Rating 1: 0/10  · Pain Rating Post-Intervention 1: 0/10    Objective:     Communicated with: GIANA prior to session.  Patient found HOB elevated upon OT entry to room.    General Precautions: Standard, fall, aspiration   Orthopedic Precautions:N/A   Braces: N/A     Occupational Performance:     Bed Mobility:    · Patient completed Supine to Sit with moderate assistance and 2 persons  · Patient completed Sit to Supine with  moderate assistance and 2 persons     Functional Mobility/Transfers:  · Patient completed Sit <> Stand Transfer with moderate assistance and of 2 persons  with  rolling walker (multiple times from EOB)    Activities of Daily Living:  · Grooming: stand by assistance washing face sitting EOB only  · Lower Body Dressing: total assistance Donning socks EOB      [facility] 6 Click ADL: 16    Treatment & Education:  Role of OT and POC  ADL retraining  Functional mobility training  Safety  Benefit of post acute therapy  Importance of EOB activity    Patient performed B UE ROM exercises (chest press only 1 x 10), but patient and daughter educated on all planes and joints focusing to improve strength and endurance to increase independence with ADLs and importance as patient has demonstrated increased weakness in UEs.     Co-treatment performed due to patient's multiple deficits requiring two skilled therapists to appropriately and safely assess patient's strength and endurance while facilitating functional tasks in addition to accommodating for patient's activity tolerance.     Patient left HOB elevated with call button in reach, daughter present and all needs met ([employer]AS[employer] camera on)Education:      GOALS:   Multidisciplinary Problems     Occupational Therapy Goals        Problem: Occupational Therapy    Goal Priority Disciplines Outcome Interventions   Occupational Therapy Goal     OT, PT/OT Ongoing, Progressing    Description: Goals to be met by: 8/13/2022     Patient will increase functional independence with ADLs by performing:    Grooming while standing at sink with Modified [place].  Toileting from toilet with Modified [place] for hygiene and clothing management.   Supine to sit with Modified [place].  Stand pivot transfers with Modified [place].  Toilet transfer to toilet with Modified [place].                     Time Tracking:     OT Date of Treatment: 08/03/22  OT Start Time: 1331  OT Stop Time:  1415  OT Total Time (min): 44 min    Billable Minutes:Self Care/Home Management 34  Therapeutic Exercise 10               8/3/2022

## 2022-08-04 PROBLEM — Z78.9 IMPAIRED MOBILITY AND ADLS: Status: ACTIVE | Noted: 2022-08-04

## 2022-08-04 PROBLEM — Z74.09 IMPAIRED MOBILITY AND ADLS: Status: ACTIVE | Noted: 2022-08-04

## 2022-08-04 PROBLEM — J96.01 ACUTE HYPOXEMIC RESPIRATORY FAILURE: Status: ACTIVE | Noted: 2022-08-04

## 2022-08-04 LAB — POCT GLUCOSE: 192 MG/DL (ref 70–110)

## 2022-08-04 PROCEDURE — 94640 AIRWAY INHALATION TREATMENT: CPT

## 2022-08-04 PROCEDURE — 25000003 PHARM REV CODE 250: Performed by: HOSPITALIST

## 2022-08-04 PROCEDURE — 99900035 HC TECH TIME PER 15 MIN (STAT)

## 2022-08-04 PROCEDURE — 25000003 PHARM REV CODE 250

## 2022-08-04 PROCEDURE — 11000001 HC ACUTE MED/SURG PRIVATE ROOM

## 2022-08-04 PROCEDURE — 99232 SBSQ HOSP IP/OBS MODERATE 35: CPT | Mod: GC,,, | Performed by: HOSPITALIST

## 2022-08-04 PROCEDURE — 25000003 PHARM REV CODE 250: Performed by: STUDENT IN AN ORGANIZED HEALTH CARE EDUCATION/TRAINING PROGRAM

## 2022-08-04 PROCEDURE — 99232 PR SUBSEQUENT HOSPITAL CARE,LEVL II: ICD-10-PCS | Mod: GC,,, | Performed by: HOSPITALIST

## 2022-08-04 PROCEDURE — 99222 PR INITIAL HOSPITAL CARE,LEVL II: ICD-10-PCS | Mod: ,,, | Performed by: NURSE PRACTITIONER

## 2022-08-04 PROCEDURE — 27000221 HC OXYGEN, UP TO 24 HOURS

## 2022-08-04 PROCEDURE — 99222 1ST HOSP IP/OBS MODERATE 55: CPT | Mod: ,,, | Performed by: NURSE PRACTITIONER

## 2022-08-04 PROCEDURE — 63600175 PHARM REV CODE 636 W HCPCS: Performed by: STUDENT IN AN ORGANIZED HEALTH CARE EDUCATION/TRAINING PROGRAM

## 2022-08-04 PROCEDURE — 94761 N-INVAS EAR/PLS OXIMETRY MLT: CPT

## 2022-08-04 RX ORDER — RISPERIDONE 0.5 MG/1
0.5 TABLET, ORALLY DISINTEGRATING ORAL NIGHTLY
Qty: 30 TABLET | Refills: 0
Start: 2022-08-04 | End: 2022-08-09 | Stop reason: SDUPTHER

## 2022-08-04 RX ORDER — DIVALPROEX SODIUM 125 MG/1
125 CAPSULE, COATED PELLETS ORAL EVERY 12 HOURS
Qty: 60 CAPSULE | Refills: 0
Start: 2022-08-04 | End: 2022-08-08 | Stop reason: SDUPTHER

## 2022-08-04 RX ORDER — DOXYLAMINE SUCCINATE 25 MG
TABLET ORAL 2 TIMES DAILY
Qty: 28 G | Refills: 0
Start: 2022-08-04

## 2022-08-04 RX ADMIN — LOSARTAN POTASSIUM 100 MG: 50 TABLET, FILM COATED ORAL at 09:08

## 2022-08-04 RX ADMIN — ALBUTEROL SULFATE 2 PUFF: 108 INHALANT RESPIRATORY (INHALATION) at 01:08

## 2022-08-04 RX ADMIN — LEVOTHYROXINE SODIUM 50 MCG: 50 TABLET ORAL at 06:08

## 2022-08-04 RX ADMIN — HYPROMELLOSE 2910 1 DROP: 5 SOLUTION OPHTHALMIC at 09:08

## 2022-08-04 RX ADMIN — ALBUTEROL SULFATE 2 PUFF: 108 INHALANT RESPIRATORY (INHALATION) at 07:08

## 2022-08-04 RX ADMIN — OXYMETAZOLINE HCL 2 SPRAY: 0.05 SPRAY NASAL at 09:08

## 2022-08-04 RX ADMIN — DIVALPROEX SODIUM 125 MG: 125 CAPSULE, COATED PELLETS ORAL at 09:08

## 2022-08-04 RX ADMIN — ENOXAPARIN SODIUM 40 MG: 40 INJECTION SUBCUTANEOUS at 05:08

## 2022-08-04 RX ADMIN — RISPERIDONE 0.5 MG: 0.5 TABLET, ORALLY DISINTEGRATING ORAL at 08:08

## 2022-08-04 RX ADMIN — MICONAZOLE NITRATE: 20 CREAM TOPICAL at 09:08

## 2022-08-04 RX ADMIN — DIVALPROEX SODIUM 125 MG: 125 CAPSULE, COATED PELLETS ORAL at 08:08

## 2022-08-04 RX ADMIN — HYPROMELLOSE 2910 1 DROP: 5 SOLUTION OPHTHALMIC at 03:08

## 2022-08-04 NOTE — PLAN OF CARE
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       Follow Up Appointments:  No future appointments.    Allergies:  Review of patient's allergies indicates:   Allergen Reactions    Penicillins Hives and Shortness Of Breath    Prednisone Hallucinations     Admitted for sujata       Medications: Review discharge medications with patient and family and provide education.    Current Facility-Administered Medications   Medication Dose Route Frequency Provider Last Rate Last Admin    acetaminophen tablet 650 mg  650 mg Oral Q6H PRN Enriqueta Erazo, DO   650 mg at 08/08/22 2043    albuterol inhaler 2 puff  2 puff Inhalation Q6H PRN Ninfa Bowser, DO        artificial tears 0.5 % ophthalmic solution 1 drop  1 drop Both Eyes TID Enriqueta Erazo, DO   1 drop at 08/09/22 0809    balsam peru-castor oiL Oint   Topical (Top) BID Dc Muñoz MD   Given at 08/09/22 0809    dextrose 10% bolus 125 mL  12.5 g Intravenous PRN Enriqueta Erazo, DO        dextrose 10% bolus 250 mL  25 g Intravenous PRN Enriqueta Bordenm, DO        divalproex capsule 125 mg  125 mg Oral Nightly PRN Renzo Jefferson MD   125 mg at 08/08/22 2337    enoxaparin injection 40 mg  40 mg Subcutaneous Daily Enriqueta Erazo, DO   40 mg at 08/08/22 1703    glucagon (human recombinant) injection 1 mg  1 mg Intramuscular PRN Enriqueta  Erazo, DO        glucose chewable tablet 16 g  16 g Oral PRN Enriqueta Erazo, DO        glucose chewable tablet 24 g  24 g Oral PRN Enriqueta Erazo, DO        levothyroxine tablet 50 mcg  50 mcg Oral Before breakfast Gadiel Grajeda MD   50 mcg at 08/09/22 0528    losartan tablet 100 mg  100 mg Oral Daily Susie Wynne MD   100 mg at 08/09/22 0809    miconazole 2 % cream   Topical (Top) BID Enriqueta Erazo, DO   Given at 08/09/22 0809    naloxone 0.4 mg/mL injection 0.02 mg  0.02 mg Intravenous PRN Enriqueta Erazo, DO        OLANZapine tablet 2.5 mg  2.5 mg Oral PRN Joy Cullen MD        polyethylene glycol packet 17 g  17 g Oral Daily Ninfa M Cubides, DO   17 g at 08/09/22 0809    risperiDONE disintegrating tablet 0.5 mg  0.5 mg Oral QHS John Nguyen MD   0.5 mg at 08/08/22 2043    salmeteroL diskus inhaler 1 puff  1 puff Inhalation BID Ninfa M Cubides, DO   1 puff at 08/09/22 0840    sodium chloride 0.9% flush 10 mL  10 mL Intravenous Q12H PRN Enriqueta Erazo, DO        tiotropium bromide 2.5 mcg/actuation inhaler 2 puff  2 puff Inhalation Daily Ninfa YURI Bowser, DO   2 puff at 08/09/22 0840     Current Discharge Medication List      START taking these medications    Details   miconazole (MICOTIN) 2 % cream Apply topically 2 (two) times daily.  Qty: 28 g, Refills: 0      risperiDONE (RISPERDAL M-TABS) 0.5 MG TbDL Take 1 tablet (0.5 mg total) by mouth every evening.  Qty: 30 tablet, Refills: 3         CONTINUE these medications which have NOT CHANGED    Details   albuterol (PROVENTIL/VENTOLIN HFA) 90 mcg/actuation inhaler Inhale 2 puffs into the lungs 2 (two) times daily as needed for Wheezing or Shortness of Breath.      BEVESPI AEROSPHERE 9-4.8 mcg HFAA Inhale 2 puffs into the lungs 2 (two) times daily.      calcium carbonate/vitamin D3 (VITAMIN D-3 ORAL) Take 1 capsule by mouth once daily.      diclofenac sodium (VOLTAREN) 1 % Gel Apply 2 g topically daily as needed (Pain).      famotidine (PEPCID) 10 MG tablet Take  10 mg by mouth 2 (two) times daily.      levothyroxine (SYNTHROID) 50 MCG tablet Take 50 mcg by mouth before breakfast.  Refills: 4      losartan (COZAAR) 100 MG tablet Take 100 mg by mouth once daily.      magnesium oxide (MAG-OX) 400 mg (241.3 mg magnesium) tablet Take 400 mg by mouth once daily.         STOP taking these medications       POTASSIUM ORAL Comments:   Reason for Stopping:         UNABLE TO FIND Comments:   Reason for Stopping:         hydroCHLOROthiazide (MICROZIDE) 12.5 mg capsule Comments:   Reason for Stopping:                 I have seen and examined this patient within the last 30 days. My clinical findings that support the need for the home health skilled services and home bound status are the following:no   Weakness/numbness causing balance and gait disturbance due to COPD Exacerbation and Weakness/Debility making it taxing to leave home.     Diet:   soft diet      Referrals/ Consults  Physical Therapy to evaluate and treat. Evaluate for home safety and equipment needs; Establish/upgrade home exercise program. Perform / instruct on therapeutic exercises, gait training, transfer training, and Range of Motion.  Occupational Therapy to evaluate and treat. Evaluate home environment for safety and equipment needs. Perform/Instruct on transfers, ADL training, ROM, and therapeutic exercises.  Wound care nurse to evaluate feet.    Activities:   activity as tolerated    Nursing:   Agency to admit patient within 24 hours of hospital discharge unless specified on physician order or at patient request    SN to complete comprehensive assessment including routine vital signs. Instruct on disease process and s/s of complications to report to MD. Review/verify medication list sent home with the patient at time of discharge  and instruct patient/caregiver as needed. Frequency may be adjusted depending on start of care date.     Skilled nurse to perform up to 3 visits PRN for symptoms related to  diagnosis    Notify MD if SBP > 160 or < 90; DBP > 90 or < 50; HR > 120 or < 50; Temp > 101; O2 < 88%;   Ok to schedule additional visits based on staff availability and patient request on consecutive days within the home health episode.    When multiple disciplines ordered:    Start of Care occurs on Sunday - Wednesday schedule remaining discipline evaluations as ordered on separate consecutive days following the start of care.    Thursday SOC -schedule subsequent evaluations Friday and Monday the following week.     Friday - Saturday SOC - schedule subsequent discipline evaluations on consecutive days starting Monday of the following week.    For all post-discharge communication and subsequent orders please contact patient's primary care physician    Miscellaneous   Routine Skin for Bedridden Patients: Instruct patient/caregiver to apply moisture barrier cream to all skin folds and wet areas in perineal area daily and after baths and all bowel movements.  Home Oxygen:  Oxygen at 1-5 L/min nasal canula to be used:  To keep SpO2 between 88-92%., Assess oxygen saturation via pulse oximeter as needed for increase in SOB., Notify physician if oxygen saturation less than 88% and Consult respiratory therapy for instruction on home oxygen use    Home Health Aide:  Nursing Weekly, Physical Therapy Every other day and Occupational Therapy Every other day    Wound Care Orders  yes:  Pressure Ulcer(s) Stage II :   Location: Bilateral medial buttocks                         If incontinent of stool or urine, apply thin layer Barrier cream                   twice daily and PRN to wound         Pressure relief measure: BPCO to moraima inner buttocks, wedge for turns, turn every 2 hours          I certify that this patient is confined to her home and needs physical therapy and occupational therapy.

## 2022-08-04 NOTE — SUBJECTIVE & OBJECTIVE
Past Medical History:   Diagnosis Date    Anxiety     Breast cancer 2000    right (lumpectomy and radiation)    Broken bones     Colon cancer     COPD (chronic obstructive pulmonary disease)     Depression     depression (no meds now)    Hypertension     Manic episode without psychotic symptoms, unspecified     due to stimulant diet pilss, resolved     Past Surgical History:   Procedure Laterality Date    APPENDECTOMY      BREAST LUMPECTOMY Right     colon operation       COLON SURGERY      HYSTERECTOMY      Pt states still have ovaries     JOINT REPLACEMENT      TONSILLECTOMY       Review of patient's allergies indicates:   Allergen Reactions    Penicillins Hives and Shortness Of Breath       Scheduled Medications:    albuterol  2 puff Inhalation Q6H WAKE    artificial tears  1 drop Both Eyes TID    divalproex  125 mg Oral Q12H    enoxaparin  40 mg Subcutaneous Daily    levothyroxine  50 mcg Oral Before breakfast    losartan  100 mg Oral Daily    miconazole   Topical (Top) BID    oxymetazoline  2 spray Each Nostril BID    risperiDONE  0.5 mg Oral QHS       PRN Medications: acetaminophen, dextrose 10%, dextrose 10%, glucagon (human recombinant), glucose, glucose, naloxone, OLANZapine, sodium chloride 0.9%    Family History    None       Tobacco Use    Smoking status: Current Every Day Smoker     Types: Cigarettes    Smokeless tobacco: Never Used    Tobacco comment: smoked for 30-35 years per daughter quit many years ago   Substance and Sexual Activity    Alcohol use: Yes     Comment: socially less than one a month    Drug use: No    Sexual activity: Not Currently     Review of Systems   Constitutional:  Positive for activity change.   Respiratory:  Positive for shortness of breath.    Neurological:  Positive for weakness.   Psychiatric/Behavioral:  Positive for confusion and decreased concentration.    Objective:     Vital Signs (Most Recent):  Temp: 97.8 °F (36.6 °C) (08/04/22 1104)  Pulse: 91 (08/04/22  1104)  Resp: 16 (08/04/22 1104)  BP: (!) 118/56 (08/04/22 1104)  SpO2: (!) 92 % (08/04/22 1104)      Vital Signs (24h Range):  Temp:  [97.8 °F (36.6 °C)-98.6 °F (37 °C)] 97.8 °F (36.6 °C)  Pulse:  [78-96] 91  Resp:  [16-34] 16  SpO2:  [92 %-97 %] 92 %  BP: ()/(52-65) 118/56     Body mass index is 31.35 kg/m².    Physical Exam  Vitals and nursing note reviewed.   Constitutional:       Appearance: She is obese.   HENT:      Mouth/Throat:      Mouth: Mucous membranes are dry.   Pulmonary:      Comments: Tachypneic  Abdominal:      Palpations: Abdomen is soft.   Musculoskeletal:      Cervical back: Normal range of motion and neck supple.   Skin:     General: Skin is warm and dry.      Capillary Refill: Capillary refill takes 2 to 3 seconds.   Neurological:      General: No focal deficit present.      Mental Status: She is alert.      GCS: GCS eye subscore is 4. GCS verbal subscore is 5. GCS motor subscore is 5.      Sensory: Sensation is intact.      Motor: Weakness present.   Psychiatric:         Attention and Perception: She is inattentive.         Mood and Affect: Affect is labile.         Speech: Speech is delayed.         Behavior: Behavior is hyperactive.         Cognition and Memory: Cognition is impaired.         Judgment: Judgment is impulsive.          Diagnostic Results: Labs: Reviewed

## 2022-08-04 NOTE — ASSESSMENT & PLAN NOTE
Ms. Arcos is a 89 yo that  according to daughter after having a 3-4 weeks ago COPD exacerbation, was started on Prednisone by her son who is a nurse. States  that after a few days she started noticing behavioral changes described as flight of ideas, insomnia, rapid speech, alternating irritability with excitability, grandiose delusions auditory hallucinations, fixation with allegories, numerology and [employer]ab[employer].     Differential includes but not limited to drug induced delirium like from recent steroids use vs manic episode from mood disorder. Unlikely to be 2/2 to infection given normal WBC and lacks of infectious symptoms. UA is also negative. UDS is also negative. TSH is normal, low likely moran for thyroid condition     - Continue to follow Psych recs   - Continue to monitor mood and mental status   - Delirium precautions   - Holding further steroids     Scheduled Medication(s):  - Continue Depakote 125mg PO BID  - Continue scheduled Risperdal m-tabs (disintegrating) 0.5 mg PO QHS for acute sujata

## 2022-08-04 NOTE — ASSESSMENT & PLAN NOTE
-Continue Depakote and Risperdal per Psych.  -Delirium precautions.  -UDS neg.  -Recent steroid use reported.   -Infectious SCOTT negative.  -Monitor VS closely, especially O2. Monitor tachypnea.

## 2022-08-04 NOTE — PLAN OF CARE
[facility]  Discharge Reassessment    Primary Care Provider: Dick Graham MD    Expected Discharge Date: 8/5/2022    Reassessment (most recent)     Discharge Reassessment - 08/04/22 0836        Discharge Reassessment    Assessment Type Discharge Planning Reassessment     Did the patient's condition or plan change since previous assessment? No     Discharge Plan discussed with: Patient;Adult children     Communicated ANA with patient/caregiver Yes     Discharge Plan A Home Health     Discharge Plan B Home Health     DME Needed Upon Discharge  walker, rolling;bedside commode     Discharge Barriers Identified None     Why the patient remains in the hospital Requires continued medical care        Post-Acute Status    Post-Acute Placement Status Set-up Complete/Auth obtained     Discharge Delays None known at this time               Patient  slept slightly , overnight with risperdal. Patient is looking better today. Walked to the bathroom with assistance today. States she wants to go home. Plan on home health per family with follow up with pulmonology  and sleep study    DCP: home health    Khadra Robison RN [facility] [phone]

## 2022-08-04 NOTE — PROGRESS NOTES
"[facility] Surg  Psychiatry  Progress Note    Patient Name: Carmina Arcos  MRN: [id number]   Code Status: Full Code  Admission Date: 7/24/2022  Hospital Length of Stay: 3 days  Expected Discharge Date: 8/5/2022  Attending Physician: Dc Muñoz MD  Primary Care Provider: Dick Graham MD    Current Legal Status: Uncontested    Patient information was obtained from patient, relative(s) and ER records.       Subjective:     Patient is a 89 y.o., female, presents with:    Principal Problem:Acute psychosis    Chief Complaint: AMS    HPI: Carmina Arcos is a 89 y.o. female with a past psychiatric history of substance-induced psychotic disorder who presented to [facility] due to Manic behavior. Psychiatry was consulted for "sujata x several days, multiple ED visits".    Per Primary Team:  89 yo W with pmhx COPD, anxiety, depression, HTN, breast CA presents with family for psych evaluation.  History is assisted by the patient and the patient's daughter.  They note that patient was having coughing flares of COPD 3 weeks ago and she was started on course of prednisone by a family member.  She was on the prednisone for almost 2 weeks.  The prednisone has since discontinued that she continues to have manic symptoms including insomnia, reckless spending behavior, typical references Septra.  She was seen in the emergency department several days ago and discharged home with close observation by family members and outpatient psychiatry follow-up.  That follow-up is in 6 days.  Family states that she will need a sooner psychiatric evaluation.  She did have history of manic episode many years ago in the setting of taking a diet pill.  She was admitted inpatient time.  The patient does report some foul-smelling odor in her urine and is concerned that she has a kidney infection.  Otherwise she is essentially without complaints.     The history is provided by the patient and a relative. No  was used.     Per " "Psychiatry:  Patient was seen in the ED with daughter at bedside; pt and daughter provided history together. Pt was alert, oriented x4, rapid pressured interruptive speech, euthymic affect. They report about a week of psychiatric symptoms including excitability/irritability, anxiety, insomnia, hyperactivity, visual hallucinations, hyperreligious and grandiose delusions. Pt has been preoccupied with numerology and believes her family is part of a biblical story and that she has been in the [facility] with Lázaro. She has also been impulsively shopping and spending large amounts of money. She has intermittent confusion, FOI, BALA on exam.    Pt has a history of COPD and started a tapered 2-wk course of PO Prednisone for a presumed exacerbation, which she got from her son (a nurse and RT). Before the course was completed, the pt's psychiatric symptoms started as described above. Family was concerned that the Prednisone may be causing her symptoms but they could not convince her to stop taking it. They presented to the ED several times since then and were initially comfortable with outpatient follow-up with pt's former psychiatrist, Dr. Ceballos, on Friday 7/29. However, they have become increasingly concerned with pt's symptoms and feel she may be a danger to herself.     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 She speaks several languages and has no known history of cognitive decline. Her psychiatric history includes one substance-induced psychotic episode in 1999. She took some stimulant diet pills, developed insomnia and visual hallucinations, and was admitted to [place]. She was started on Risperdal and Zoloft, which she continued to see Dr. Ceballos for for some time. Eventually her symptoms resolved and she went back to her "relatively high-strung" baseline, per daughter. She has been out of Dr. Ceballos's care and not having psychiatric " "issues for the past 10+ years. Pt's daughter also reports a remote suicide attempt over 30 yrs ago, via Benadryl overdose.    Prior to the course of Prednisone, pt was at baseline with none of her current symptoms. She last took Prednisone on Wednesday 7/20 but the symptoms have persisted. Pt and family are amenable to inpt hospitalization for her current symptoms.    Psychiatric Review of Systems-is patient experiencing or having changes in  sleep: yes, insomnia  appetite: no  weight: no  energy/anergy: yes, hyperactive  interest/pleasure/anhedonia: no  somatic symptoms: no  libido: no  anxiety/panic: yes  guilty/hopelessness: no  concentration: yes  S.I.B.s/risky behavior: yes, impulsive shopping  any drugs: no  alcohol: no     Allergies:  Penicillins      Hospital Course:   Initiated Seroquel 50 mg PO QHS overnight on 7/25, but had poor sleep, frequently getting up from bed and becoming agitated regarding delusional thought content (per daughter and nursing). Labile affect with hyperreligious thoughts. Disoriented to time, place. Seroquel was siwtched to risperdal 0.5mg qhs on 7/26, continued to be hyperreligious but AOx4. Intermittently agitated overnight on 7/27, with incident during which she fell at 3AM as she frequently got out of bed without assistance. Became physically threatening with staff in the AM. Continued to have minimal sleep.     7/28/22:  Overnight, pt had another fall/weakness episode requiring assistance to return to bed from bathroom. She received scheduled Risperdal 1 mg PO scheduled and 0.5 mg PRN @ 0216 for agitation. Pt reportedly did not respond to the PRN medication and continued to be elevated, irritable, and not sleeping. This morning, pt was sleeping but rousable for interview. She continued to be irritable and hyperreligious, referring to her "visions" and sleeping when God wants her to. She was oriented x3 and denied complaints other than wanting to sleep and being tired of getting " "interrupted. Interview terminated due to pt's irritability.    7/29/22:  Patient was agitated yesterday, frequently screaming, crying, and calling family members to discuss Caodaism delusions. She received PRN IM Zyprexa 2.5 mg yesterday afternoon and slept for several hours afterward. She received her first dose of QHS VPA yesterday and slept well throughout the night, per nursing. Pt was sleeping and difficult to arouse this morning but participated in brief psychiatric interview. She was oriented to time/place and endorsed good sleep overnight. Recalls recent events (daughter bringing baked goods to bedside). She reported she was still having her "visions" but said they were "allegory", not real. Interview terminated due to pt's drowsiness. Per nursing, pt was conversant/oriented this morning with son-in-law at bedside, both remarked how much calmer she was today than previous days.    7/31/22: Patient resting in bed with family at bedside, minimally interactive with eyes closed throughout most of interaction. Described the year as "2023" and when informed it was 2022, denied and stated that it was 2023. Perseverated on saying "they weren't telling the truth," and when asked who "they" were, she continuously stated "everyone." Reportedly not given risperdal and depakote d/t confusion and inability to swallow.    8/1/22:  Patient was frequently somnolent and intermittently unresponsive over the weekend, suspected to be 2/2 CO2 narcosis (confirmed via ABG yesterday, improved with Bipap). Per chart, she did not receive PO psych meds over the weekend due to difficulty swallowing and poor mentation. Her PO Depakote was switched to IV Depakon 125 BID. She was seen sleeping, upright in bed this morning, difficult to rouse and to keep alert/attentive. Frequently falling asleep and mumbling incoherently but also answering appropriately at times; oriented x4. Her family at bedside is concerned about her breathing as well " "as her ongoing irritability and refusal to cooperate with care at times.    8/2/22:  Slept poorly overnight per daughter in room and per nursing notes. Patient reports she slept fine. Not wearing O2 at this time. Says that if she wears it she will die. Cannot explain why - instead tells me to "fast forward, you'll find out." Oriented to year, month, place, self. Hurrying me along - "Go ahead ask another question doctor" - "Can you hurry and ask your questions?" Daughter says she is rushing me because then she doesn't have to try and organize her thoughts.     8/3/22:  Slept a bit better overnight with risperdal. Received 0.5mg dose. Daughter in room states patient is looking better today. Walked to the bathroom with assistance today. States she wants to go home. Minimal participation in interview at this time. Trying to catch up on sleep. Does know that grass requires "water and sun" to grow, oriented with prompting. Mood is good.    8/4/22:  Slept 6-8 hours last night per son. Taking risperdal and depakote as prescribed. Agitation improved, but remains somewhat irritable and intermittently confused. Oriented to self place and year. Hurrying the conversation with her daughter. Encouraged her to put her nasal cannula in, which she briefly did and then said "I can't!" and removed it. Says her mood is good.       Family History    None       Tobacco Use    Smoking status: Current Every Day Smoker     Types: Cigarettes    Smokeless tobacco: Never Used    Tobacco comment: smoked for 30-35 years per daughter quit many years ago   Substance and Sexual Activity    Alcohol use: Yes     Comment: socially less than one a month    Drug use: No    Sexual activity: Not Currently     Psychotherapeutics (From admission, onward)                Start     Stop Route Frequency Ordered    08/02/22 2230  risperiDONE disintegrating tablet 0.5 mg         -- Oral Nightly 08/02/22 2117 07/29/22 1727  OLANZapine injection 1.3 mg      " "   -- IM Every 8 hours PRN 07/29/22 1767               Objective:     Vital Signs (Most Recent):  Temp: 98.4 °F (36.9 °C) (08/02/22 2100)  Pulse: 90 (08/03/22 0815)  Resp: 19 (08/03/22 0815)  BP: 136/80 (08/03/22 0400)  SpO2: (!) 92 % (08/03/22 0815)   Vital Signs (24h Range):  Temp:  [98.4 °F (36.9 °C)-98.6 °F (37 °C)] 98.4 °F (36.9 °C)  Pulse:  [] 90  Resp:  [14-29] 19  SpO2:  [92 %-99 %] 92 %  BP: (115-136)/(56-80) 136/80     Height: 5' 3" (160 cm)  Weight: 80.3 kg (177 lb)  Body mass index is 31.35 kg/m².      Intake/Output Summary (Last 24 hours) at 8/3/2022 1326  Last data filed at 8/3/2022 0300  Gross per 24 hour   Intake 100 ml   Output 750 ml   Net -650 ml     Mental Status Exam:  Appearance: age appropriate, well nourished, casually dressed, lying in bed  Behavior/Cooperation: normal, cooperative  Speech: slurred, normal rate, normal pitch, normal volume  Mood: good  Affect: constricted  Thought Process: loose associations  Thought Content: delusions: yes, grandiose and hyperreligious. No SI/HI/AVH.    Orientation: person, place, situation  Memory: Recent and remote intact  Attention Span/Concentration: Impaired  Insight: intact  Judgment: limited    Significant Labs: Last 24 Hours:   Recent Lab Results         08/03/22  1004   08/03/22  0346        Albumin   2.8       Alkaline Phosphatase   78       ALT   11       Anion Gap   8       Appearance, UA Hazy         AST   13       Baso #   0.04       Basophil %   0.7       Bilirubin (UA) Negative         BILIRUBIN TOTAL   0.5  Comment: For infants and newborns, interpretation of results should be based  on gestational age, weight and in agreement with clinical  observations.    Premature Infant recommended reference ranges:  Up to 24 hours.............<8.0 mg/dL  Up to 48 hours............<12.0 mg/dL  3-5 days..................<15.0 mg/dL  6-29 days.................<15.0 mg/dL         BUN   14       Calcium   9.2       Chloride   101       CO2   36       " Color, UA Kathryn         Creatinine   0.6       Differential Method   Automated       eGFR   >60.0       Eos #   0.2       Eosinophil %   4.0       Glucose   129       Glucose, UA 2+         Gran # (ANC)   4.4       Gran %   73.3       Hematocrit   39.6       Hemoglobin   11.9       Immature Grans (Abs)   0.02  Comment: Mild elevation in immature granulocytes is non specific and   can be seen in a variety of conditions including stress response,   acute inflammation, trauma and pregnancy. Correlation with other   laboratory and clinical findings is essential.         Immature Granulocytes   0.3       Ketones, UA 1+         Leukocytes, UA Negative         Lymph #   0.8       Lymph %   12.7       Magnesium   2.4       MCH   27.5       MCHC   30.1       MCV   92       Mono #   0.5       Mono %   9.0       MPV   11.1       NITRITE UA Negative         nRBC   0       Occult Blood UA Negative         pH, UA 5.0         Phosphorus   3.9       Platelet Estimate   Appears normal       Platelets   135       Potassium   4.5       PROTEIN TOTAL   6.0       Protein, UA Negative  Comment: Recommend a 24 hour urine protein or a urine   protein/creatinine ratio if globulin induced proteinuria is  clinically suspected.           RBC   4.32       RDW   13.2       Sodium   145       Specific [place], UA 1.030         Specimen UA Urine, Clean Catch         WBC   6.00                Scheduled Medications:   albuterol  2 puff Inhalation Q6H WAKE    artificial tears  1 drop Both Eyes TID    divalproex  125 mg Oral Q12H    enoxaparin  40 mg Subcutaneous Daily    levothyroxine  50 mcg Oral Before breakfast    losartan  100 mg Oral Daily    miconazole   Topical (Top) BID    oxymetazoline  2 spray Each Nostril BID    risperiDONE  0.5 mg Oral QHS       PRN Medications:  acetaminophen, dextrose 10%, dextrose 10%, glucagon (human recombinant), glucose, glucose, naloxone, OLANZapine, sodium chloride 0.9%    Review of patient's allergies  indicates:   Allergen Reactions    Penicillins Hives and Shortness Of Breath       Assessment/Plan:     * Acute psychosis  ASSESSMENT     Carmina Arcos is a 89 y.o. female with a past psychiatric history of SIPD, who [place] [facility] [place] to manic behavior. Her symptoms of insomnia, hyperactivity, impulsivity, and delusions are likely 2/2 recent course of steroids. However, these symptoms are endangering her and have been difficult for her family to manage, especially due to the delay in seeing an outpatient provider. Pt's hospital course has been complicated by new somnolence and hypercapneic respiratory failure. Primary team initiated infectious workup.    IMPRESSION  Substance-induced psychotic disorder  R/o bipolar disorder    RECOMMENDATION(S)      1. Scheduled Medication(s):  - Continue Depakote 125mg PO BID  - Continue scheduled Risperdal m-tabs (disintegrating) 0.5 mg PO QHS for acute sujata  - Can give Zyprexa 1.25 mg IM if refusing PO Risperdal  - Agree with starting Melatonin 9 mg PO QHS  - Primary team discussed starting Trazodone - we feel that the risks of orthostatic hypotension outweigh the potential benefit, which is likely minimal in this acutely manic patient    2. PRN Medication(s):  - Continue Risperdal 0.5 mg PO q8hrs PRN non-redirectable agitation  - Continue PRN Zyprexa 2.5 mg PO or IM to 1.25 mg q8hrs PRN    3.  Monitor:  - Please obtain EKG to monitor QTc    4. Legal Status/Precaution(s):  - Patient does not meet criteria for PEC at this time. Patient does appear gravely disabled due to a psychiatric illness but is help-seeking and not contesting her admission. She may not have full capacity to refuse meds at this time given her altered mentation.  - Recommend fall precautions and strict bed rest with alarm on, low bed height, to avoid falls due to potential orthostatic hypotension secondary to Risperdal in this elderly patient.         Total time:  25 with greater than 50% of this  time spent in counseling and/or coordination of care.       Krish Eden MD   Psychiatry PGY-4  [facility] S[facility]

## 2022-08-04 NOTE — CONSULTS
Inpatient consult to Physical Medicine Rehab  Consult performed by: Anna Chaidez NP  Consult ordered by: Dc Muñoz MD      Consult received.  Reviewed patient history and current admission.  PM&R following.    Anna Chaidez NP  Physical Medicine & Rehabilitation   08/04/2022

## 2022-08-04 NOTE — HPI
"Per chart review, Ms Grewal is a 89 yo that  according to daughter after having a 3-4 weeks ago COPD exacerbation, was started on Prednisone by her son who is a nurse. States  that after a few days she started noticing behavioral changes described as flight of ideas, insomnia, rapid speech, alternating irritability with excitability, grandiose delusions auditory hallucinations, fixation with allegories, numerology and Islam.  Daughter also affirms, reckless spending. She was taken to [facility] but there was no psych service available. They decided to come to [facility] for psychiatric evaluation.  States previous episode precipitated by "diet pills " in 1999 and had to be committed to [facility] for a week. There she was started on risperidone, its unclear for how long she was on it. Daughter states that the patient also has a history of depression with ne suicide attempt. Pt was evaluated by Psych and was not needing PEC. She is started on Risperdal and Depakote.  Pt had spiked a fever, for which infectious work up was done per . She was started on Rocephin. Inf work up was negative and IV antibiotic was discontinued. Pt remains on O2 per NC at 2L. Of note, pt is not on home O2. PM & R was consulted to evaluate pt for rehab.        Functional History: Patient lives alone  in a single  story home with no steps  to enter.  Prior to admission, pt was independent with ADLs and mobility.  DME: None.   "

## 2022-08-04 NOTE — SUBJECTIVE & OBJECTIVE
Interval History:   Son states that she slept more and took her meds s prescribed. Still somewhat confused, irritable at times. Family is planning for her to go to rehab.    Review of Systems   Constitutional:  Positive for activity change.   Respiratory:  Positive for shortness of breath.    Neurological:  Positive for weakness.   Psychiatric/Behavioral:  Positive for confusion and decreased concentration.    Objective:     Vital Signs (Most Recent):  Temp: 98 °F (36.7 °C) (08/04/22 1540)  Pulse: 91 (08/04/22 1540)  Resp: 16 (08/04/22 1540)  BP: (!) 119/58 (08/04/22 1540)  SpO2: 95 % (08/04/22 1540)   Vital Signs (24h Range):  Temp:  [97.8 °F (36.6 °C)-98.6 °F (37 °C)] 98 °F (36.7 °C)  Pulse:  [78-96] 91  Resp:  [16-34] 16  SpO2:  [92 %-99 %] 95 %  BP: ()/(52-65) 119/58     Weight: 80.3 kg (177 lb)  Body mass index is 31.35 kg/m².    Intake/Output Summary (Last 24 hours) at 8/4/2022 1813  Last data filed at 8/4/2022 1715  Gross per 24 hour   Intake 530 ml   Output 1050 ml   Net -520 ml      Physical Exam  Constitutional:       General: She is not in acute distress.     Comments: In bed sleeping and somnolent when called   HENT:      Head: Normocephalic and atraumatic.      Nose: No congestion or rhinorrhea.      Mouth/Throat:      Mouth: Mucous membranes are dry.      Dentition: Does not have dentures.   Cardiovascular:      Rate and Rhythm: Normal rate.      Pulses: Normal pulses.      Heart sounds: No murmur heard.    No friction rub. No gallop.   Pulmonary:      Breath sounds: No wheezing or rhonchi.      Comments: Breathing rapid shallow breaths 27pm  Abdominal:      General: There is no distension.      Palpations: There is no mass.   Musculoskeletal:         General: No swelling or tenderness.   Lymphadenopathy:      Cervical: No cervical adenopathy.   Skin:     General: Skin is warm.   Neurological:      Mental Status: She is disoriented.      Motor: Weakness present.       Significant Labs: All  pertinent labs within the past 24 hours have been reviewed.  Recent Lab Results       None            Significant Imaging: I have reviewed all pertinent imaging results/findings within the past 24 hours.

## 2022-08-04 NOTE — CONSULTS
"[facility] Surg  Physical Medicine & Rehab  Consult Note    Patient Name: Carmina Arcos  MRN: [id number]  Admission Date: 7/24/2022  Hospital Length of Stay: 3 days  Attending Physician: Dc Muñoz MD  Consults  Subjective:     Principal Problem: Acute psychosis    HPI: Per chart review, Ms Grewal is a 89 yo that  according to daughter after having a 3-4 weeks ago COPD exacerbation, was started on Prednisone by her son who is a nurse. States  that after a few days she started noticing behavioral changes described as flight of ideas, insomnia, rapid speech, alternating irritability with excitability, grandiose delusions auditory hallucinations, fixation with allegories, numerology and Jehovah's witness.  Daughter also affirms, reckless spending. She was taken to [facility] but there was no psych service available. [place] [facility].  States previous episode precipitated by "diet pills " in 1999 and had to be committed to [facility] for a week. There she was started on risperidone, its unclear for how long she was on it. Daughter states that the patient also has a history of depression with ne suicide attempt. Pt was evaluated by Psych and was not needing PEC. She is started on Risperdal and Depakote.  Pt had spiked a fever, for which infectious work up was done per . She was started on Rocephin. Inf work up was negative and IV antibiotic was discontinued. Pt remains on O2 per NC at 2L. Of note, pt is not on home O2. PM & R was consulted to evaluate pt for rehab.        Functional History: Patient lives alone  in a single  story home with no steps  to enter.  Prior to admission, pt was independent with ADLs and mobility.  DME: None.       Hospital Course: PT-08/03      Bed Mobility:  · Supine > Sit: Moderate Assistancex2  · Sit > Supine: Moderate Assistancex2     Transfers:   1. Sit <> Stand Transfer: Moderate Assistancex2 from eob with RW x3 trials  ? Stood for ~2 " min each trial  ? Max cueing to keep eyes open and to stand upright; increased posterior lean                 Gait:  · Deferred due to poor static standing balance    OT- 08/03    Bed Mobility:    · Patient completed Supine to Sit with moderate assistance and 2 persons  · Patient completed Sit to Supine with moderate assistance and 2 persons      Functional Mobility/Transfers:  · Patient completed Sit <> Stand Transfer with moderate assistance and of 2 persons  with  rolling walker (multiple times from EOB)     Activities of Daily Living:  · Grooming: stand by assistance washing face sitting EOB only  · Lower Body Dressing: total assistance Donning socks EOB      Past Medical History:   Diagnosis Date    Anxiety     Breast cancer 2000    right (lumpectomy and radiation)    Broken bones     Colon cancer     COPD (chronic obstructive pulmonary disease)     Depression     depression (no meds now)    Hypertension     Manic episode without psychotic symptoms, unspecified     due to stimulant diet pilss, resolved     Past Surgical History:   Procedure Laterality Date    APPENDECTOMY      BREAST LUMPECTOMY Right     colon operation       COLON SURGERY      HYSTERECTOMY      Pt states still have ovaries     JOINT REPLACEMENT      TONSILLECTOMY       Review of patient's allergies indicates:   Allergen Reactions    Penicillins Hives and Shortness Of Breath       Scheduled Medications:    albuterol  2 puff Inhalation Q6H WAKE    artificial tears  1 drop Both Eyes TID    divalproex  125 mg Oral Q12H    enoxaparin  40 mg Subcutaneous Daily    levothyroxine  50 mcg Oral Before breakfast    losartan  100 mg Oral Daily    miconazole   Topical (Top) BID    oxymetazoline  2 spray Each Nostril BID    risperiDONE  0.5 mg Oral QHS       PRN Medications: acetaminophen, dextrose 10%, dextrose 10%, glucagon (human recombinant), glucose, glucose, naloxone, OLANZapine, sodium chloride 0.9%    Family History    None        Tobacco Use    Smoking status: Current Every Day Smoker     Types: Cigarettes    Smokeless tobacco: Never Used    Tobacco comment: smoked for 30-35 years per daughter quit many years ago   Substance and Sexual Activity    Alcohol use: Yes     Comment: socially less than one a month    Drug use: No    Sexual activity: Not Currently     Review of Systems   Constitutional:  Positive for activity change.   Respiratory:  Positive for shortness of breath.    Neurological:  Positive for weakness.   Psychiatric/Behavioral:  Positive for confusion and decreased concentration.    Objective:     Vital Signs (Most Recent):  Temp: 97.8 °F (36.6 °C) (08/04/22 1104)  Pulse: 91 (08/04/22 1104)  Resp: 16 (08/04/22 1104)  BP: (!) 118/56 (08/04/22 1104)  SpO2: (!) 92 % (08/04/22 1104)      Vital Signs (24h Range):  Temp:  [97.8 °F (36.6 °C)-98.6 °F (37 °C)] 97.8 °F (36.6 °C)  Pulse:  [78-96] 91  Resp:  [16-34] 16  SpO2:  [92 %-97 %] 92 %  BP: ()/(52-65) 118/56     Body mass index is 31.35 kg/m².    Physical Exam  Vitals and nursing note reviewed.   Constitutional:       Appearance: She is obese.   HENT:      Mouth/Throat:      Mouth: Mucous membranes are dry.   Pulmonary:      Comments: Tachypneic  Abdominal:      Palpations: Abdomen is soft.   Musculoskeletal:      Cervical back: Normal range of motion and neck supple.   Skin:     General: Skin is warm and dry.      Capillary Refill: Capillary refill takes 2 to 3 seconds.   Neurological:      General: No focal deficit present.      Mental Status: She is alert.      GCS: GCS eye subscore is 4. GCS verbal subscore is 5. GCS motor subscore is 5.      Sensory: Sensation is intact.      Motor: Weakness present.   Psychiatric:         Attention and Perception: She is inattentive.         Mood and Affect: Affect is labile.         Speech: Speech is delayed.         Behavior: Behavior is hyperactive.         Cognition and Memory: Cognition is impaired.         Judgment:  Judgment is impulsive.          Diagnostic Results: Labs: Reviewed    Assessment/Plan:     * Acute psychosis  -Continue Depakote and Risperdal per Psych.  -Delirium precautions.  -UDS neg.  -Recent steroid use reported.   -Infectious SCOTT negative.  -Monitor VS closely, especially O2. Monitor tachypnea.     Impaired mobility and ADLs  See hospital course for functional status.    Recommendations  -  Encourage mobility, OOB in chair, and early ambulation as appropriate  -  PT/OT evaluate and treat  -  Pain management  -  DVT prophylaxis if appropriate   -  Monitor for and prevent skin breakdown and pressure ulcers  · Early mobility, repositioning/weight shifting every 20-30 minutes when sitting, turn patient every 2 hours, proper mattress/overlay and chair cushioning, pressure relief/heel protector boots      Delirium  -Delirium precautions in place.  -Encourage family presence.      COPD, severity to be determined  -Continue Duo nebs as needed as per primary.  -Continue O2 in place. Monitor Pulse Ox closely.  -Continue CPAP use at night.     Hypertension  -Monitor BP closely.     PM&R Recommendation:     At this time, the PM&R team has reviewed this patient's ongoing medical case including inpatient diagnosis, medical history, clinical examination, labs, vitals, current social and functional history to provide the post-acute recommendation as follows:     RECOMMENDATIONS: skilled nursing facility due to patient poor tolerance for consistent therapy/poor potential for improvement with rehabilitation. If SNF is not possible, would agree with   Once pt is medically stable.         We will sign off.    Thank you for your consult.     Anna Chaidez NP  Department of Physical Medicine & Rehab  [facility] - Med Surg

## 2022-08-04 NOTE — ASSESSMENT & PLAN NOTE
-Continue Duo nebs as needed as per primary.  -Continue O2 in place. Monitor Pulse Ox closely.  -Continue CPAP use at night.

## 2022-08-04 NOTE — ASSESSMENT & PLAN NOTE
Patient with Hypoxic Respiratory failure which is Acute on chronic.  she is not on home oxygen. Supplemental oxygen was provided and noted- Oxygen Concentration (%):  [32] 32.   Signs/symptoms of respiratory failure include- tachypnea and increased work of breathing. Contributing diagnoses includes - COPD Labs and images were reviewed. Patient Has not had a recent ABG.

## 2022-08-04 NOTE — CARE UPDATE
RAPID RESPONSE NURSE ROUND       Rounding completed with charge RN, Marilee. No concerns verbalized at this time. Instructed to call [phone] for further concerns or assistance.

## 2022-08-04 NOTE — PLAN OF CARE
Spoke with daughter brenda over the phone.  Plan has changed for her to go o-rehab.  Referral sent. Notified liason.  pmr order is in.     Khadra Robison RN [facility] [phone]

## 2022-08-04 NOTE — HOSPITAL COURSE
Pt-08/05    Functional Mobility:  Bed Mobility: pt needed verbal cues for hand placement and sequencing for functional mobility.    Rolling Right: maximal assistance  Supine to Sit: maximal assistance  Sit to Supine: maximal assistance     Balance pt sat on EOB x 10 min with CGA to perform ADLS with OT.       OT-08/05    Bed Mobility:    Patient completed Rolling/Turning to Left with  maximal assistance  Patient completed Scooting/Bridging with maximal assistance  Patient completed Supine to Sit with maximal assistance  Patient completed Sit to Supine with maximal assistance      Functional Mobility/Transfers:  Deferred 2/2 drop in O2 sats.     Activities of Daily Living:  Grooming: minimum assistance with [place] assist to initiate due to decreased cognition.        PT-08/03      Bed Mobility:  Supine > Sit: Moderate Assistancex2  Sit > Supine: Moderate Assistancex2     Transfers:   Sit <> Stand Transfer: Moderate Assistancex2 from eob with RW x3 trials  Stood for ~2 min each trial  Max cueing to keep eyes open and to stand upright; increased posterior lean                 Gait:  Deferred due to poor static standing balance    OT- 08/03    Bed Mobility:    Patient completed Supine to Sit with moderate assistance and 2 persons  Patient completed Sit to Supine with moderate assistance and 2 persons      Functional Mobility/Transfers:  Patient completed Sit <> Stand Transfer with moderate assistance and of 2 persons  with  rolling walker (multiple times from EOB)     Activities of Daily Living:  Grooming: stand by assistance washing face sitting EOB only  Lower Body Dressing: total assistance Donning socks EOB

## 2022-08-04 NOTE — PROGRESS NOTES
"[facility] Medicine  Progress Note    Patient Name: Carmina Arcos  MRN: [id number]  Patient Class: IP- Inpatient   Admission Date: 7/24/2022  Length of Stay: 3 days  Attending Physician: Dc Muñoz MD  Primary Care Provider: Dick Graham MD        Subjective:     Principal Problem:Acute psychosis        HPI:  History provided mostly by daughter Nimco. Ms Grewal is a 89 yo that  according to daughter after having a 3-4 weeks ago COPD exacerbation, was started on Prednisone by her son who is a nurse. States  that after a few days she started noticing behavioral changes described as flight of ideas, insomnia, rapid speech, alternating irritability with excitability, grandiose delusions auditory hallucinations, fixation with allegories, numerology and Scientology.  Daughter also affirms, reckless spending. She was taken to [facility] but there was no psych service available. [place] [facility] [place].  States previous episode precipitated by "diet pills " in 1999 and had to be committed to [facility] for a week. There she was started on risperidone, its unclear for how long she was on it. Daughter states that the patient also has a history of depression with ne suicide attempt.    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 She currently was living on her own and managed rental property, her family lives close to her home and visit regularly.    PMH: Breast Cancer? Colon Cancer. HTN treated with losartan hydrochlorothiazide and amlodipine, Hypothyroidism treated with levothyroxine, COPD treated with with formoterol-glycopyrrolate , albuterol PRN and Sildenafil given by his son.    Surgical hx positive for bilateral knee replacement, tonsillectomy, hysterectomy, right lumpectomy and colon surgery .    Allergies: Penicillin      Overview/Hospital Course:    Ms. Arcos was admitted on 7/24 " for acute manic symptoms. She was started on a regimen of Seroquel QHS but then switched to Risperidone. There was moderate improvement in her psychiatric symptoms with addition of Depakote. She developed a fever on 7/29 and received a dose of ceftriaxone on 7/30 for concern of pneumonia in the setting of worsening hypoxia and cough. Workup including CXR, procalcitonin, and SARS-CoV-2 testing was unremarkable and ceftriaxone was discontinued. On 7/31, she developed increasing dyspnea and ABGs indicated a respiratory acidosis with CO2 retention. On 8/1, Risperidone was withheld for concerns over sedation. Azithromycin was prescribed for anti-inflammation in the setting of COPD. On 8/2, she continues to be dyspneic      Interval History:   Son states that she slept more and took her meds s prescribed. Still somewhat confused, irritable at times. Family is planning for her to go to rehab.    Review of Systems   Constitutional:  Positive for activity change.   Respiratory:  Positive for shortness of breath.    Neurological:  Positive for weakness.   Psychiatric/Behavioral:  Positive for confusion and decreased concentration.    Objective:     Vital Signs (Most Recent):  Temp: 98 °F (36.7 °C) (08/04/22 1540)  Pulse: 91 (08/04/22 1540)  Resp: 16 (08/04/22 1540)  BP: (!) 119/58 (08/04/22 1540)  SpO2: 95 % (08/04/22 1540)   Vital Signs (24h Range):  Temp:  [97.8 °F (36.6 °C)-98.6 °F (37 °C)] 98 °F (36.7 °C)  Pulse:  [78-96] 91  Resp:  [16-34] 16  SpO2:  [92 %-99 %] 95 %  BP: ()/(52-65) 119/58     Weight: 80.3 kg (177 lb)  Body mass index is 31.35 kg/m².    Intake/Output Summary (Last 24 hours) at 8/4/2022 1813  Last data filed at 8/4/2022 1715  Gross per 24 hour   Intake 530 ml   Output 1050 ml   Net -520 ml      Physical Exam  Constitutional:       General: She is not in acute distress.     Comments: In bed sleeping and somnolent when called   HENT:      Head: Normocephalic and atraumatic.      Nose: No congestion or  rhinorrhea.      Mouth/Throat:      Mouth: Mucous membranes are dry.      Dentition: Does not have dentures.   Cardiovascular:      Rate and Rhythm: Normal rate.      Pulses: Normal pulses.      Heart sounds: No murmur heard.    No friction rub. No gallop.   Pulmonary:      Breath sounds: No wheezing or rhonchi.      Comments: Breathing rapid shallow breaths 27pm  Abdominal:      General: There is no distension.      Palpations: There is no mass.   Musculoskeletal:         General: No swelling or tenderness.   Lymphadenopathy:      Cervical: No cervical adenopathy.   Skin:     General: Skin is warm.   Neurological:      Mental Status: She is disoriented.      Motor: Weakness present.       Significant Labs: All pertinent labs within the past 24 hours have been reviewed.  Recent Lab Results       None            Significant Imaging: I have reviewed all pertinent imaging results/findings within the past 24 hours.      Assessment/Plan:      * Acute psychosis  Ms. Arcos is a 89 yo that  according to daughter after having a 3-4 weeks ago COPD exacerbation, was started on Prednisone by her son who is a nurse. States  that after a few days she started noticing behavioral changes described as flight of ideas, insomnia, rapid speech, alternating irritability with excitability, grandiose delusions auditory hallucinations, fixation with allegories, numerology and [employer].     Differential includes but not limited to drug induced delirium like from recent steroids use vs manic episode from mood disorder. Unlikely to be 2/2 to infection given normal WBC and lacks of infectious symptoms. UA is also negative. UDS is also negative. TSH is normal, low likely moran for thyroid condition     - Continue to follow Psych recs   - Continue to monitor mood and mental status   - Delirium precautions   - Holding further steroids     Scheduled Medication(s):  - Continue Depakote 125mg PO BID  - Continue scheduled Risperdal m-tabs  (disintegrating) 0.5 mg PO QHS for acute sujata           Acute hypoxemic respiratory failure  Patient with Hypoxic Respiratory failure which is Acute on chronic.  she is not on home oxygen. Supplemental oxygen was provided and noted- Oxygen Concentration (%):  [32] 32.   Signs/symptoms of respiratory failure include- tachypnea and increased work of breathing. Contributing diagnoses includes - COPD Labs and images were reviewed. Patient Has not had a recent ABG.     Impaired mobility and ADLs  Rehab at discharge    COPD exacerbation    -Avoid Steroid nebulizer.  -patient has refused nebulization, agrees to do them today  -ventolin q4 prn      Mood disorder due to a general medical condition  See acute psychosis    Sensorineural hearing loss (SNHL) of both ears        Dysphagia    Soft mechanical food in diet    Hypertension  HCTZ 25 mg QD, Losartan 100 mg QD           VTE Risk Mitigation (From admission, onward)         Ordered     enoxaparin injection 40 mg  Daily         07/24/22 1332     IP VTE HIGH RISK PATIENT  Once         07/24/22 1332     Place sequential compression device  Until discontinued         07/24/22 1332                Discharge Planning   ANA: 8/5/2022     Code Status: Full Code   Is the patient medically ready for discharge?: No    Reason for patient still in hospital (select all that apply): Patient trending condition  Discharge Plan A: Home Health   Discharge Delays: None known at this time              Gadiel Grajeda MD  Department of Hospital Medicine   [facility] S[facility]

## 2022-08-05 LAB
ALBUMIN SERPL BCP-MCNC: 2.6 G/DL (ref 3.5–5.2)
ALP SERPL-CCNC: 76 U/L (ref 55–135)
ALT SERPL W/O P-5'-P-CCNC: 10 U/L (ref 10–44)
ANION GAP SERPL CALC-SCNC: 11 MMOL/L (ref 8–16)
AST SERPL-CCNC: 13 U/L (ref 10–40)
BACTERIA BLD CULT: NORMAL
BACTERIA BLD CULT: NORMAL
BASOPHILS # BLD AUTO: 0.03 K/UL (ref 0–0.2)
BASOPHILS NFR BLD: 0.3 % (ref 0–1.9)
BILIRUB SERPL-MCNC: 0.4 MG/DL (ref 0.1–1)
BUN SERPL-MCNC: 34 MG/DL (ref 10–30)
CALCIUM SERPL-MCNC: 9.3 MG/DL (ref 8.7–10.5)
CHLORIDE SERPL-SCNC: 98 MMOL/L (ref 95–110)
CO2 SERPL-SCNC: 33 MMOL/L (ref 23–29)
CREAT SERPL-MCNC: 1.1 MG/DL (ref 0.5–1.4)
DIFFERENTIAL METHOD: ABNORMAL
EOSINOPHIL # BLD AUTO: 0.2 K/UL (ref 0–0.5)
EOSINOPHIL NFR BLD: 2.1 % (ref 0–8)
ERYTHROCYTE [DISTWIDTH] IN BLOOD BY AUTOMATED COUNT: 13.1 % (ref 11.5–14.5)
EST. GFR  (NO RACE VARIABLE): 47.4 ML/MIN/1.73 M^2
GLUCOSE SERPL-MCNC: 134 MG/DL (ref 70–110)
HCT VFR BLD AUTO: 39 % (ref 37–48.5)
HGB BLD-MCNC: 11.5 G/DL (ref 12–16)
IMM GRANULOCYTES # BLD AUTO: 0.03 K/UL (ref 0–0.04)
IMM GRANULOCYTES NFR BLD AUTO: 0.3 % (ref 0–0.5)
LYMPHOCYTES # BLD AUTO: 1.1 K/UL (ref 1–4.8)
LYMPHOCYTES NFR BLD: 12.3 % (ref 18–48)
MAGNESIUM SERPL-MCNC: 2.2 MG/DL (ref 1.6–2.6)
MCH RBC QN AUTO: 27.9 PG (ref 27–31)
MCHC RBC AUTO-ENTMCNC: 29.5 G/DL (ref 32–36)
MCV RBC AUTO: 95 FL (ref 82–98)
MONOCYTES # BLD AUTO: 0.7 K/UL (ref 0.3–1)
MONOCYTES NFR BLD: 7.9 % (ref 4–15)
NEUTROPHILS # BLD AUTO: 6.6 K/UL (ref 1.8–7.7)
NEUTROPHILS NFR BLD: 77.1 % (ref 38–73)
NRBC BLD-RTO: 0 /100 WBC
PHOSPHATE SERPL-MCNC: 4.7 MG/DL (ref 2.7–4.5)
PLATELET # BLD AUTO: 162 K/UL (ref 150–450)
PMV BLD AUTO: 11.2 FL (ref 9.2–12.9)
POTASSIUM SERPL-SCNC: 4.7 MMOL/L (ref 3.5–5.1)
PROT SERPL-MCNC: 5.9 G/DL (ref 6–8.4)
RBC # BLD AUTO: 4.12 M/UL (ref 4–5.4)
SODIUM SERPL-SCNC: 142 MMOL/L (ref 136–145)
WBC # BLD AUTO: 8.59 K/UL (ref 3.9–12.7)

## 2022-08-05 PROCEDURE — 63600175 PHARM REV CODE 636 W HCPCS: Performed by: STUDENT IN AN ORGANIZED HEALTH CARE EDUCATION/TRAINING PROGRAM

## 2022-08-05 PROCEDURE — 25000003 PHARM REV CODE 250: Performed by: HOSPITALIST

## 2022-08-05 PROCEDURE — 36415 COLL VENOUS BLD VENIPUNCTURE: CPT | Performed by: STUDENT IN AN ORGANIZED HEALTH CARE EDUCATION/TRAINING PROGRAM

## 2022-08-05 PROCEDURE — 25000003 PHARM REV CODE 250

## 2022-08-05 PROCEDURE — 99232 SBSQ HOSP IP/OBS MODERATE 35: CPT | Mod: GC,,, | Performed by: HOSPITALIST

## 2022-08-05 PROCEDURE — 97110 THERAPEUTIC EXERCISES: CPT

## 2022-08-05 PROCEDURE — 99232 PR SUBSEQUENT HOSPITAL CARE,LEVL II: ICD-10-PCS | Mod: GC,,, | Performed by: HOSPITALIST

## 2022-08-05 PROCEDURE — 25000003 PHARM REV CODE 250: Performed by: STUDENT IN AN ORGANIZED HEALTH CARE EDUCATION/TRAINING PROGRAM

## 2022-08-05 PROCEDURE — 99900035 HC TECH TIME PER 15 MIN (STAT)

## 2022-08-05 PROCEDURE — 11000001 HC ACUTE MED/SURG PRIVATE ROOM

## 2022-08-05 PROCEDURE — 97530 THERAPEUTIC ACTIVITIES: CPT

## 2022-08-05 PROCEDURE — 80053 COMPREHEN METABOLIC PANEL: CPT | Performed by: STUDENT IN AN ORGANIZED HEALTH CARE EDUCATION/TRAINING PROGRAM

## 2022-08-05 PROCEDURE — 85025 COMPLETE CBC W/AUTO DIFF WBC: CPT | Performed by: STUDENT IN AN ORGANIZED HEALTH CARE EDUCATION/TRAINING PROGRAM

## 2022-08-05 PROCEDURE — 84100 ASSAY OF PHOSPHORUS: CPT | Performed by: STUDENT IN AN ORGANIZED HEALTH CARE EDUCATION/TRAINING PROGRAM

## 2022-08-05 PROCEDURE — 94640 AIRWAY INHALATION TREATMENT: CPT

## 2022-08-05 PROCEDURE — 94761 N-INVAS EAR/PLS OXIMETRY MLT: CPT

## 2022-08-05 PROCEDURE — 25000242 PHARM REV CODE 250 ALT 637 W/ HCPCS: Performed by: STUDENT IN AN ORGANIZED HEALTH CARE EDUCATION/TRAINING PROGRAM

## 2022-08-05 PROCEDURE — 97535 SELF CARE MNGMENT TRAINING: CPT

## 2022-08-05 PROCEDURE — 27000221 HC OXYGEN, UP TO 24 HOURS

## 2022-08-05 PROCEDURE — 83735 ASSAY OF MAGNESIUM: CPT | Performed by: STUDENT IN AN ORGANIZED HEALTH CARE EDUCATION/TRAINING PROGRAM

## 2022-08-05 RX ORDER — IPRATROPIUM BROMIDE AND ALBUTEROL SULFATE 2.5; .5 MG/3ML; MG/3ML
3 SOLUTION RESPIRATORY (INHALATION) EVERY 4 HOURS
Status: DISCONTINUED | OUTPATIENT
Start: 2022-08-05 | End: 2022-08-05

## 2022-08-05 RX ORDER — DIVALPROEX SODIUM 125 MG/1
125 CAPSULE, COATED PELLETS ORAL NIGHTLY
Status: DISCONTINUED | OUTPATIENT
Start: 2022-08-05 | End: 2022-08-08

## 2022-08-05 RX ORDER — IPRATROPIUM BROMIDE AND ALBUTEROL SULFATE 2.5; .5 MG/3ML; MG/3ML
3 SOLUTION RESPIRATORY (INHALATION) EVERY 6 HOURS
Status: DISCONTINUED | OUTPATIENT
Start: 2022-08-05 | End: 2022-08-07

## 2022-08-05 RX ADMIN — OXYMETAZOLINE HCL 2 SPRAY: 0.05 SPRAY NASAL at 10:08

## 2022-08-05 RX ADMIN — RISPERIDONE 0.5 MG: 0.5 TABLET, ORALLY DISINTEGRATING ORAL at 08:08

## 2022-08-05 RX ADMIN — DIVALPROEX SODIUM 125 MG: 125 CAPSULE, COATED PELLETS ORAL at 08:08

## 2022-08-05 RX ADMIN — DIVALPROEX SODIUM 125 MG: 125 CAPSULE, COATED PELLETS ORAL at 10:08

## 2022-08-05 RX ADMIN — MICONAZOLE NITRATE: 20 CREAM TOPICAL at 08:08

## 2022-08-05 RX ADMIN — IPRATROPIUM BROMIDE AND ALBUTEROL SULFATE 3 ML: 2.5; .5 SOLUTION RESPIRATORY (INHALATION) at 07:08

## 2022-08-05 RX ADMIN — HYPROMELLOSE 2910 1 DROP: 5 SOLUTION OPHTHALMIC at 10:08

## 2022-08-05 RX ADMIN — LOSARTAN POTASSIUM 100 MG: 50 TABLET, FILM COATED ORAL at 10:08

## 2022-08-05 RX ADMIN — ENOXAPARIN SODIUM 40 MG: 40 INJECTION SUBCUTANEOUS at 07:08

## 2022-08-05 RX ADMIN — IPRATROPIUM BROMIDE AND ALBUTEROL SULFATE 3 ML: 2.5; .5 SOLUTION RESPIRATORY (INHALATION) at 01:08

## 2022-08-05 RX ADMIN — IPRATROPIUM BROMIDE AND ALBUTEROL SULFATE 3 ML: .5; 3 SOLUTION RESPIRATORY (INHALATION) at 08:08

## 2022-08-05 RX ADMIN — SALMETEROL XINAFOATE 1 PUFF: 50 POWDER, METERED ORAL; RESPIRATORY (INHALATION) at 08:08

## 2022-08-05 RX ADMIN — HYPROMELLOSE 2910 1 DROP: 5 SOLUTION OPHTHALMIC at 08:08

## 2022-08-05 RX ADMIN — LEVOTHYROXINE SODIUM 50 MCG: 50 TABLET ORAL at 06:08

## 2022-08-05 RX ADMIN — SODIUM CHLORIDE, SODIUM LACTATE, POTASSIUM CHLORIDE, AND CALCIUM CHLORIDE 1000 ML: .6; .31; .03; .02 INJECTION, SOLUTION INTRAVENOUS at 10:08

## 2022-08-05 RX ADMIN — MICONAZOLE NITRATE: 20 CREAM TOPICAL at 10:08

## 2022-08-05 RX ADMIN — HYPROMELLOSE 2910 1 DROP: 5 SOLUTION OPHTHALMIC at 02:08

## 2022-08-05 NOTE — PT/OT/SLP PROGRESS
Occupational Therapy   Treatment    Name: Carmina Arcos  MRN: [id number]  Admitting Diagnosis:  Acute psychosis       Recommendations:     Discharge Recommendations: nursing facility, skilled  Discharge Equipment Recommendations:  bedside commode, walker, rolling  Barriers to discharge:  None    Assessment:     Carmina Arcos is a 89 y.o. female with a medical diagnosis of Acute psychosis. Pt sat EOB 10 minutes to complete ADLs - O2 sats dropped to 78% on O2 so pt returned to supine and nurse notified. Performance deficits affecting function are weakness, impaired endurance, impaired self care skills, impaired functional mobility, gait instability, impaired balance, impaired cognition, decreased coordination, decreased safety awareness, decreased upper extremity function, decreased lower extremity function, decreased ROM.     Rehab Prognosis:  Good; patient would benefit from acute skilled OT services to address these deficits and reach maximum level of function.       Plan:     Patient to be seen 3 x/week to address the above listed problems via self-care/home management, therapeutic activities, therapeutic exercises, neuromuscular re-education  · Plan of Care Expires: 08/26/22  · Plan of Care Reviewed with: patient, daughter    Subjective     Pain/Comfort:  · Pain Rating 1: 0/10    Objective:     Communicated with: rn prior to session.  Patient found supine with pulse ox (continuous), oxygen upon OT entry to room.    General Precautions: Standard, fall   Orthopedic Precautions:N/A   Braces:    Respiratory Status: Nasal cannula, flow 2 L/min     Occupational Performance:     Bed Mobility:    · Patient completed Rolling/Turning to Left with  maximal assistance  · Patient completed Scooting/Bridging with maximal assistance  · Patient completed Supine to Sit with maximal assistance  · Patient completed Sit to Supine with maximal assistance     Functional Mobility/Transfers:  Deferred 2/2 drop in O2  sats.    Activities of Daily Living:  · Grooming: minimum assistance with [place] assist to initiate due to decreased cognition.    [facility] 6 Click ADL: 15    Treatment & Education:  EOB sitting balance = SBA/CGA  Discussed OT POC.  Encouraged activity / OOB with staff over the weekend.    Patient left supine with all lines intact and call button in reachEducation:      GOALS:   Multidisciplinary Problems     Occupational Therapy Goals        Problem: Occupational Therapy    Goal Priority Disciplines Outcome Interventions   Occupational Therapy Goal     OT, PT/OT Ongoing, Progressing    Description: Goals to be met by: 8/13/2022     Patient will increase functional independence with ADLs by performing:    Grooming while standing at sink with Modified [place].  Toileting from toilet with Modified [place] for hygiene and clothing management.   Supine to sit with Modified [place].  Stand pivot transfers with Modified [place].  Toilet transfer to toilet with Modified [place].                     Time Tracking:     OT Date of Treatment: 08/05/22  OT Start Time: 1032  OT Stop Time: 1056  OT Total Time (min): 24 min    Billable Minutes:Self Care/Home Management 24    OT/ALISON: OT          8/5/2022

## 2022-08-05 NOTE — ASSESSMENT & PLAN NOTE
-Avoid Steroid nebulizer.  -patient has refused nebulization, agrees to do them today  -Duonebs q6  - Continue salmeterol and tiotropium

## 2022-08-05 NOTE — PT/OT/SLP PROGRESS
Physical Therapy Co- Treatment with OT    Patient Name:  Carmina Arcos   MRN:  [id number]    Recommendations:     Discharge Recommendations:  nursing facility, skilled   Discharge Equipment Recommendations:  (will determine DME needs closer to discharge)   Barriers to discharge: Decreased caregiver support family will not be able to assist pt at current functional level.     Assessment:     Carmina Arcos is a 89 y.o. female admitted with a medical diagnosis of Acute psychosis.  She presents with the following impairments/functional limitations:  weakness, impaired endurance, impaired functional mobility, gait instability, impaired balance, decreased safety awareness, decreased lower extremity function  Pt tolerated treatment well and will benefit from cont skilled PT to progress physically. Pt will need SNF placement when medically stable to maximize rehab potential.     Rehab Prognosis: Good; patient would benefit from acute skilled PT services to address these deficits and reach maximum level of function.    Recent Surgery: * No surgery found *      Plan:     During this hospitalization, patient to be seen 3 x/week to address the identified rehab impairments via gait training, therapeutic activities, therapeutic exercises, neuromuscular re-education and progress toward the following goals:    · Plan of Care Expires:  08/25/22    Subjective     Chief Complaint: pt had no complaints during treatment.   Patient/Family Comments/goals: daughter's goals:pt to return to previous functional level   Pain/Comfort:  · Pain Rating 1: 0/10  · Pain Rating Post-Intervention 1: 0/10      Objective:     Communicated with nurse prior to session.  Patient found supine with bed alarm, oxygen, PureWick, pulse ox (continuous), blood pressure cuff, telemetry (hep lock IV, avasys camera,) upon PT entry to room.     General Precautions: Standard, fall   Orthopedic Precautions:N/A   Braces:    Respiratory Status: Nasal cannula, flow 1  L/min     Functional Mobility:  · Bed Mobility: pt needed verbal cues for hand placement and sequencing for functional mobility.    · Rolling Right: maximal assistance  · Supine to Sit: maximal assistance  · Sit to Supine: maximal assistance    Balance pt sat on EOB x 10 min with CGA to perform ADLS with OT.   ·       AM-PAC 6 CLICK MOBILITY  Turning over in bed (including adjusting bedclothes, sheets and blankets)?: 2  Sitting down on and standing up from a chair with arms (e.g., wheelchair, bedside commode, etc.): 2  Moving from lying on back to sitting on the side of the bed?: 2  Moving to and from a bed to a chair (including a wheelchair)?: 2  Need to walk in hospital room?: 1  Climbing 3-5 steps with a railing?: 1  Basic Mobility Total Score: 10       Therapeutic Activities and Exercises:   pt performed AAROM there exer BLE in sitting for LAQ x 10 reps.  Pt O2 sats on visi decreased to low 80s. Dynamap for pulse ox correlated. Pt was returned to supine in bed and RN was called and was assessing pt.     Pt daughter received verbal instructions in PT POC and verbally expressed understanding of such.     Patient left supine with all lines intact, call button in reach and RN and pt daughter present..    GOALS:   Multidisciplinary Problems     Physical Therapy Goals        Problem: Physical Therapy    Goal Priority Disciplines Outcome Goal Variances Interventions   Physical Therapy Goal     PT, PT/OT Ongoing, Progressing     Description: Goals to be met by: 2022     Patient will increase functional independence with mobility by performin. Supine to sit with Modified [place].- not met  2. Sit to supine with Modified [place]. - not met  3. Sit to stand transfer with Modified [place] using Single- point Cane. - not met  4. Bed to chair transfer with Modified [place] using Single-point Cane   - not met  5. Gait  x 200 feet with Modified [place] using Single-point Cane . - not met                      Time Tracking:     PT Received On: 08/05/22  PT Start Time: 1033     PT Stop Time: 1056  PT Total Time (min): 23 min     Billable Minutes: Therapeutic Activity 13 min and Therapeutic Exercise 10 min       PT/PTA: PT     PTA Visit Number: 0     08/05/2022

## 2022-08-05 NOTE — NURSING
Telesitter in place, pt in bed sleeping (easy to arouse), no s/s of distress.  Chst x-ray ordered.  Bolus of LR ordered-IV access not obtained, charge, RN notified. Several attempts made, none successful. Midline placement ordered, pt put on list. PIV obtained, midline canx.  Bolus given, will check kidney fx 8/6 for d/c.    Telesitter in place, pt in bed in lowest setting, wheels locked, sr upx2, call light within reach.

## 2022-08-05 NOTE — PROGRESS NOTES
"[facility] S[facility]  Psychiatry  Progress Note    Patient Name: Carmina Arcos  MRN: [id number]   Code Status: Full Code  Admission Date: 7/24/2022  Hospital Length of Stay: 4 days  Expected Discharge Date: 8/5/2022  Attending Physician: Dc Muñoz MD  Primary Care Provider: Dick Graham MD    Current Legal Status: Uncontested    Patient information was obtained from patient, relative(s) and ER records.       Subjective:     Patient is a 89 y.o., female, presents with:    Principal Problem:Acute psychosis    Chief Complaint: AMS    HPI: Carmina Arcos is a 89 y.o. female with a past psychiatric history of substance-induced psychotic disorder who [place] [facility] [place] to Manic behavior. Psychiatry was consulted for "sujata x several days, multiple ED visits".    Per Primary Team:  89 yo W with pmhx COPD, anxiety, depression, HTN, breast CA presents with family for psych evaluation.  History is assisted by the patient and the patient's daughter.  They note that patient was having coughing flares of COPD 3 weeks ago and she was started on course of prednisone by a family member.  She was on the prednisone for almost 2 weeks.  The prednisone has since discontinued that she continues to have manic symptoms including insomnia, reckless spending behavior, typical references Septra.  She was seen in the emergency department several days ago and discharged home with close observation by family members and outpatient psychiatry follow-up.  That follow-up is in 6 days.  Family states that she will need a sooner psychiatric evaluation.  She did have history of manic episode many years ago in the setting of taking a diet pill.  She was admitted inpatient time.  The patient does report some foul-smelling odor in her urine and is concerned that she has a kidney infection.  Otherwise she is essentially without complaints.     The history is provided by the patient and a relative. No  was used.     Per " "Psychiatry:  Patient was seen in the ED with daughter at bedside; pt and daughter provided history together. Pt was alert, oriented x4, rapid pressured interruptive speech, euthymic affect. They report about a week of psychiatric symptoms including excitability/irritability, anxiety, insomnia, hyperactivity, visual hallucinations, hyperreligious and grandiose delusions. Pt has been preoccupied with numerology and believes her family is part of a biblical story and that she has been in the [facility] with Lázaro. She has also been impulsively shopping and spending large amounts of money. She has intermittent confusion, FOI, BALA on exam.    Pt has a history of COPD and started a tapered 2-wk course of PO Prednisone for a presumed exacerbation, which she got from her son (a nurse and RT). Before the course was completed, the pt's psychiatric symptoms started as described above. Family was concerned that the Prednisone may be causing her symptoms but they could not convince her to stop taking it. They presented to the ED several times since then and were initially comfortable with outpatient follow-up with pt's former psychiatrist, Dr. Ceballos, on Friday 7/29. However, they have become increasingly concerned with pt's symptoms and feel she may be a danger to herself.     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 She speaks several languages and has no known history of cognitive decline. Her psychiatric history includes one substance-induced psychotic episode in 1999. She took some stimulant diet pills, developed insomnia and visual hallucinations, and was admitted to [place]. She was started on Risperdal and Zoloft, which she continued to see Dr. Ceballos for for some time. Eventually her symptoms resolved and she went back to her "relatively high-strung" baseline, per daughter. She has been out of Dr. Ceballos's care and not having psychiatric " "issues for the past 10+ years. Pt's daughter also reports a remote suicide attempt over 30 yrs ago, via Benadryl overdose.    Prior to the course of Prednisone, pt was at baseline with none of her current symptoms. She last took Prednisone on Wednesday 7/20 but the symptoms have persisted. Pt and family are amenable to inpt hospitalization for her current symptoms.    Psychiatric Review of Systems-is patient experiencing or having changes in  sleep: yes, insomnia  appetite: no  weight: no  energy/anergy: yes, hyperactive  interest/pleasure/anhedonia: no  somatic symptoms: no  libido: no  anxiety/panic: yes  guilty/hopelessness: no  concentration: yes  S.I.B.s/risky behavior: yes, impulsive shopping  any drugs: no  alcohol: no     Allergies:  Penicillins      Hospital Course:   Initiated Seroquel 50 mg PO QHS overnight on 7/25, but had poor sleep, frequently getting up from bed and becoming agitated regarding delusional thought content (per daughter and nursing). Labile affect with hyperreligious thoughts. Disoriented to time, place. Seroquel was siwtched to risperdal 0.5mg qhs on 7/26, continued to be hyperreligious but AOx4. Intermittently agitated overnight on 7/27, with incident during which she fell at 3AM as she frequently got out of bed without assistance. Became physically threatening with staff in the AM. Continued to have minimal sleep.     7/28/22:  Overnight, pt had another fall/weakness episode requiring assistance to return to bed from bathroom. She received scheduled Risperdal 1 mg PO scheduled and 0.5 mg PRN @ 0216 for agitation. Pt reportedly did not respond to the PRN medication and continued to be elevated, irritable, and not sleeping. This morning, pt was sleeping but rousable for interview. She continued to be irritable and hyperreligious, referring to her "visions" and sleeping when God wants her to. She was oriented x3 and denied complaints other than wanting to sleep and being tired of getting " "interrupted. Interview terminated due to pt's irritability.    7/29/22:  Patient was agitated yesterday, frequently screaming, crying, and calling family members to discuss Yazidism delusions. She received PRN IM Zyprexa 2.5 mg yesterday afternoon and slept for several hours afterward. She received her first dose of QHS VPA yesterday and slept well throughout the night, per nursing. Pt was sleeping and difficult to arouse this morning but participated in brief psychiatric interview. She was oriented to time/place and endorsed good sleep overnight. Recalls recent events (daughter bringing baked goods to bedside). She reported she was still having her "visions" but said they were "allegory", not real. Interview terminated due to pt's drowsiness. Per nursing, pt was conversant/oriented this morning with son-in-law at bedside, both remarked how much calmer she was today than previous days.    7/31/22: Patient resting in bed with family at bedside, minimally interactive with eyes closed throughout most of interaction. Described the year as "2023" and when informed it was 2022, denied and stated that it was 2023. Perseverated on saying "they weren't telling the truth," and when asked who "they" were, she continuously stated "everyone." Reportedly not given risperdal and depakote d/t confusion and inability to swallow.    8/1/22:  Patient was frequently somnolent and intermittently unresponsive over the weekend, suspected to be 2/2 CO2 narcosis (confirmed via ABG yesterday, improved with Bipap). Per chart, she did not receive PO psych meds over the weekend due to difficulty swallowing and poor mentation. Her PO Depakote was switched to IV Depakon 125 BID. She was seen sleeping, upright in bed this morning, difficult to rouse and to keep alert/attentive. Frequently falling asleep and mumbling incoherently but also answering appropriately at times; oriented x4. Her family at bedside is concerned about her breathing as well " "as her ongoing irritability and refusal to cooperate with care at times.    8/2/22:  Slept poorly overnight per daughter in room and per nursing notes. Patient reports she slept fine. Not wearing O2 at this time. Says that if she wears it she will die. Cannot explain why - instead tells me to "fast forward, you'll find out." Oriented to year, month, place, self. Hurrying me along - "Go ahead ask another question doctor" - "Can you hurry and ask your questions?" Daughter says she is rushing me because then she doesn't have to try and organize her thoughts.     8/3/22:  Slept a bit better overnight with risperdal. Received 0.5mg dose. Daughter in room states patient is looking better today. Walked to the bathroom with assistance today. States she wants to go home. Minimal participation in interview at this time. Trying to catch up on sleep. Does know that grass requires "water and sun" to grow, oriented with prompting. Mood is good.    8/4/22:  Slept 6-8 hours last night per son. Taking risperdal and depakote as prescribed. Agitation improved, but remains somewhat irritable and intermittently confused. Oriented to self place and year. Hurrying the conversation with her daughter. Encouraged her to put her nasal cannula in, which she briefly did and then said "I can't!" and removed it. Says her mood is good.     8/5/22:  Slept well overnight again. Taking risperdal and depakote as prescribed. Agitation improved, but remains somewhat irritable and intermittently confused. On interview this AM she had just had an attempted blood draw and was in some distress so did not participate meaningfully in interview.  Seen again with staff this afternoon. She is SAVEAHAART positive at this time. Oriented to self, place, month, year. Unable to say what a train and bike have in common.      Family History    None       Tobacco Use    Smoking status: Current Every Day Smoker     Types: Cigarettes    Smokeless tobacco: Never Used    " "Tobacco comment: smoked for 30-35 years per daughter quit many years ago   Substance and Sexual Activity    Alcohol use: Yes     Comment: socially less than one a month    Drug use: No    Sexual activity: Not Currently     Psychotherapeutics (From admission, onward)                Start     Stop Route Frequency Ordered    08/02/22 2230  risperiDONE disintegrating tablet 0.5 mg         -- Oral Nightly 08/02/22 2117 07/29/22 1727  OLANZapine injection 1.3 mg         -- IM Every 8 hours PRN 07/29/22 1727               Objective:     Vital Signs (Most Recent):  Temp: 98.4 °F (36.9 °C) (08/02/22 2100)  Pulse: 90 (08/03/22 0815)  Resp: 19 (08/03/22 0815)  BP: 136/80 (08/03/22 0400)  SpO2: (!) 92 % (08/03/22 0815)   Vital Signs (24h Range):  Temp:  [98.4 °F (36.9 °C)-98.6 °F (37 °C)] 98.4 °F (36.9 °C)  Pulse:  [] 90  Resp:  [14-29] 19  SpO2:  [92 %-99 %] 92 %  BP: (115-136)/(56-80) 136/80     Height: 5' 3" (160 cm)  Weight: 80.3 kg (177 lb)  Body mass index is 31.35 kg/m².      Intake/Output Summary (Last 24 hours) at 8/3/2022 1326  Last data filed at 8/3/2022 0300  Gross per 24 hour   Intake 100 ml   Output 750 ml   Net -650 ml     Mental Status Exam:  Appearance: age appropriate, well nourished, casually dressed, lying in bed  Behavior/Cooperation: normal, cooperative  Speech: slurred, normal rate, normal pitch, normal volume  Mood: good  Affect: constricted  Thought Process: loose associations  Thought Content: delusions: yes, grandiose and hyperreligious. No SI/HI/AVH.    Orientation: person, place, situation  Memory: Recent and remote intact  Attention Span/Concentration: Impaired  Insight: intact  Judgment: limited    Significant Labs: Last 24 Hours:   Recent Lab Results         08/03/22  1004   08/03/22  0346        Albumin   2.8       Alkaline Phosphatase   78       ALT   11       Anion Gap   8       Appearance, UA Hazy         AST   13       Baso #   0.04       Basophil %   0.7       Bilirubin (UA) " Negative         BILIRUBIN TOTAL   0.5  Comment: For infants and newborns, interpretation of results should be based  on gestational age, weight and in agreement with clinical  observations.    Premature Infant recommended reference ranges:  Up to 24 hours.............<8.0 mg/dL  Up to 48 hours............<12.0 mg/dL  3-5 days..................<15.0 mg/dL  6-29 days.................<15.0 mg/dL         BUN   14       Calcium   9.2       Chloride   101       CO2   36       Color, UA Kathryn         Creatinine   0.6       Differential Method   Automated       eGFR   >60.0       Eos #   0.2       Eosinophil %   4.0       Glucose   129       Glucose, UA 2+         Gran # (ANC)   4.4       Gran %   73.3       Hematocrit   39.6       Hemoglobin   11.9       Immature Grans (Abs)   0.02  Comment: Mild elevation in immature granulocytes is non specific and   can be seen in a variety of conditions including stress response,   acute inflammation, trauma and pregnancy. Correlation with other   laboratory and clinical findings is essential.         Immature Granulocytes   0.3       Ketones, UA 1+         Leukocytes, UA Negative         Lymph #   0.8       Lymph %   12.7       Magnesium   2.4       MCH   27.5       MCHC   30.1       MCV   92       Mono #   0.5       Mono %   9.0       MPV   11.1       NITRITE UA Negative         nRBC   0       Occult Blood UA Negative         pH, UA 5.0         Phosphorus   3.9       Platelet Estimate   Appears normal       Platelets   135       Potassium   4.5       PROTEIN TOTAL   6.0       Protein, UA Negative  Comment: Recommend a 24 hour urine protein or a urine   protein/creatinine ratio if globulin induced proteinuria is  clinically suspected.           RBC   4.32       RDW   13.2       Sodium   145       Specific [place], UA 1.030         Specimen UA Urine, Clean Catch         WBC   6.00                Scheduled Medications:   albuterol-ipratropium  3 mL Nebulization Q6H    artificial  tears  1 drop Both Eyes TID    divalproex  125 mg Oral Q12H    enoxaparin  40 mg Subcutaneous Daily    lactated ringers  1,000 mL Intravenous Once    levothyroxine  50 mcg Oral Before breakfast    losartan  100 mg Oral Daily    miconazole   Topical (Top) BID    oxymetazoline  2 spray Each Nostril BID    risperiDONE  0.5 mg Oral QHS    salmeteroL  1 puff Inhalation BID    [START ON 8/6/2022] tiotropium bromide  2 puff Inhalation Daily       PRN Medications:  acetaminophen, dextrose 10%, dextrose 10%, glucagon (human recombinant), glucose, glucose, naloxone, OLANZapine, sodium chloride 0.9%    Review of patient's allergies indicates:   Allergen Reactions    Penicillins Hives and Shortness Of Breath       Assessment/Plan:     * Acute psychosis  ASSESSMENT     Carmina Arcos is a 89 y.o. female with a past psychiatric history of SIPD, who presented to the [facility] due to manic behavior. Her symptoms of insomnia, hyperactivity, impulsivity, and delusions are likely 2/2 recent course of steroids. However, these symptoms are endangering her and have been difficult for her family to manage, especially due to the delay in seeing an outpatient provider. Pt's hospital course has been complicated by new somnolence and hypercapneic respiratory failure. Primary team initiated infectious workup.    IMPRESSION  Substance-induced psychotic disorder  Encephalopathy / Delirium  R/o bipolar disorder    RECOMMENDATION(S)      1. Scheduled Medication(s):  - Continue Depakote 125mg PO BID  - Continue scheduled Risperdal m-tabs (disintegrating) 0.5 mg PO QHS for acute sujata  - CAM-ICU positive 8/5 - continue delirium precautions  - Can give Zyprexa 1.25 mg IM if refusing PO Risperdal  - Agree with starting Melatonin 9 mg PO QHS  - Primary team discussed starting Trazodone - we feel that the risks of orthostatic hypotension outweigh the potential benefit, which is likely minimal in this acutely manic patient    2. PRN  Medication(s):  - Continue Risperdal 0.5 mg PO q8hrs PRN non-redirectable agitation  - Continue PRN Zyprexa 2.5 mg PO or IM to 1.25 mg q8hrs PRN    3.  Monitor:  - Please obtain EKG to monitor QTc    4. Legal Status/Precaution(s):  - Patient does not meet criteria for PEC at this time. Patient does appear gravely disabled due to a psychiatric illness but is help-seeking and not contesting her admission. She may not have full capacity to refuse meds at this time given her altered mentation.  - Recommend fall precautions and strict bed rest with alarm on, low bed height, to avoid falls due to potential orthostatic hypotension secondary to Risperdal in this elderly patient.         Total time:  25 with greater than 50% of this time spent in counseling and/or coordination of care.       Krish Eden MD   Psychiatry PGY-4  [facility] - Med Surg

## 2022-08-05 NOTE — PLAN OF CARE
Problem: Physical Therapy  Goal: Physical Therapy Goal  Description: Goals to be met by: 2022     Patient will increase functional independence with mobility by performin. Supine to sit with Modified [place].- not met  2. Sit to supine with Modified [place]. - not met  3. Sit to stand transfer with Modified [place] using Single- point Cane. - not met  4. Bed to chair transfer with Modified [place] using Single-point Cane   - not met  5. Gait  x 200 feet with Modified [place] using Single-point Cane . - not met    Outcome: Ongoing, Progressing   Goals updated for time frame and are appropriate. 2022

## 2022-08-05 NOTE — SUBJECTIVE & OBJECTIVE
Interval History: Patient not eating much, start IV fluids. Denies any complaints.     Review of Systems   Constitutional:  Positive for activity change.   Respiratory:  Positive for shortness of breath.    Neurological:  Positive for weakness.   Psychiatric/Behavioral:  Positive for confusion and decreased concentration.    Objective:     Vital Signs (Most Recent):  Temp: 98 °F (36.7 °C) (08/05/22 1100)  Pulse: 97 (08/05/22 1340)  Resp: (!) 28 (08/05/22 1340)  BP: (!) 101/50 (08/05/22 1100)  SpO2: 97 % (08/05/22 1340)   Vital Signs (24h Range):  Temp:  [98 °F (36.7 °C)-99.5 °F (37.5 °C)] 98 °F (36.7 °C)  Pulse:  [72-97] 97  Resp:  [16-31] 28  SpO2:  [88 %-98 %] 97 %  BP: (101-126)/(50-60) 101/50     Weight: 80.3 kg (177 lb)  Body mass index is 31.35 kg/m².    Intake/Output Summary (Last 24 hours) at 8/5/2022 1418  Last data filed at 8/5/2022 0500  Gross per 24 hour   Intake 540 ml   Output 550 ml   Net -10 ml      Physical Exam  Constitutional:       General: She is not in acute distress.     Comments: In bed sleeping and somnolent when called   HENT:      Head: Normocephalic and atraumatic.      Nose: No congestion or rhinorrhea.      Mouth/Throat:      Mouth: Mucous membranes are dry.      Dentition: Does not have dentures.   Cardiovascular:      Rate and Rhythm: Normal rate.      Pulses: Normal pulses.      Heart sounds: No murmur heard.    No friction rub. No gallop.   Pulmonary:      Effort: Pulmonary effort is normal.      Breath sounds: Wheezing present. No rhonchi.      Comments: Unable to take deep breaths  Abdominal:      General: There is no distension.      Palpations: There is no mass.   Musculoskeletal:         General: No swelling or tenderness.   Lymphadenopathy:      Cervical: No cervical adenopathy.   Skin:     General: Skin is warm.   Neurological:      Mental Status: She is disoriented.      Motor: Weakness present.       Significant Labs: All pertinent labs within the past 24 hours have been  reviewed.  CBC:   Recent Labs   Lab 08/05/22  0452   WBC 8.59   HGB 11.5*   HCT 39.0        CMP:   Recent Labs   Lab 08/05/22  0452      K 4.7   CL 98   CO2 33*   *   BUN 34*   CREATININE 1.1   CALCIUM 9.3   PROT 5.9*   ALBUMIN 2.6*   BILITOT 0.4   ALKPHOS 76   AST 13   ALT 10   ANIONGAP 11       Significant Imaging: I have reviewed all pertinent imaging results/findings within the past 24 hours.

## 2022-08-05 NOTE — PLAN OF CARE
Tried to call daughter about o-rehab denying patient but no one answered the phone and I couldn't leave a message.  Will try again later.  Sent out snf referrals.     Khadra Robison RN [facility] [phone]    0923: called in locet.  142 to come to [email or web address].      1400: spoke with daughter over the phone.  She wants patient to go home with home health instead of snf.  But at the end of the conversation, she said she still wanted me to send out referrals to other rehab places and snf facilities including [facility].  I did that, and I notified her that insurance will deny her for rehab especially with [employer].  She still wanted me to send it out.

## 2022-08-06 LAB — VALPROATE SERPL-MCNC: 15.8 UG/ML (ref 50–100)

## 2022-08-06 PROCEDURE — 25000003 PHARM REV CODE 250: Performed by: HOSPITALIST

## 2022-08-06 PROCEDURE — 94799 UNLISTED PULMONARY SVC/PX: CPT

## 2022-08-06 PROCEDURE — 99900035 HC TECH TIME PER 15 MIN (STAT)

## 2022-08-06 PROCEDURE — 25000242 PHARM REV CODE 250 ALT 637 W/ HCPCS: Performed by: STUDENT IN AN ORGANIZED HEALTH CARE EDUCATION/TRAINING PROGRAM

## 2022-08-06 PROCEDURE — 25000003 PHARM REV CODE 250

## 2022-08-06 PROCEDURE — 99232 SBSQ HOSP IP/OBS MODERATE 35: CPT | Mod: GC,,, | Performed by: HOSPITALIST

## 2022-08-06 PROCEDURE — 27000221 HC OXYGEN, UP TO 24 HOURS

## 2022-08-06 PROCEDURE — 94640 AIRWAY INHALATION TREATMENT: CPT

## 2022-08-06 PROCEDURE — 25000003 PHARM REV CODE 250: Performed by: STUDENT IN AN ORGANIZED HEALTH CARE EDUCATION/TRAINING PROGRAM

## 2022-08-06 PROCEDURE — 11000001 HC ACUTE MED/SURG PRIVATE ROOM

## 2022-08-06 PROCEDURE — 63600175 PHARM REV CODE 636 W HCPCS: Performed by: STUDENT IN AN ORGANIZED HEALTH CARE EDUCATION/TRAINING PROGRAM

## 2022-08-06 PROCEDURE — 94761 N-INVAS EAR/PLS OXIMETRY MLT: CPT

## 2022-08-06 PROCEDURE — 80164 ASSAY DIPROPYLACETIC ACD TOT: CPT | Performed by: STUDENT IN AN ORGANIZED HEALTH CARE EDUCATION/TRAINING PROGRAM

## 2022-08-06 PROCEDURE — 36415 COLL VENOUS BLD VENIPUNCTURE: CPT | Performed by: STUDENT IN AN ORGANIZED HEALTH CARE EDUCATION/TRAINING PROGRAM

## 2022-08-06 PROCEDURE — 99232 PR SUBSEQUENT HOSPITAL CARE,LEVL II: ICD-10-PCS | Mod: GC,,, | Performed by: HOSPITALIST

## 2022-08-06 RX ADMIN — DIVALPROEX SODIUM 125 MG: 125 CAPSULE, COATED PELLETS ORAL at 08:08

## 2022-08-06 RX ADMIN — IPRATROPIUM BROMIDE AND ALBUTEROL SULFATE 3 ML: 2.5; .5 SOLUTION RESPIRATORY (INHALATION) at 08:08

## 2022-08-06 RX ADMIN — HYPROMELLOSE 2910 1 DROP: 5 SOLUTION OPHTHALMIC at 08:08

## 2022-08-06 RX ADMIN — IPRATROPIUM BROMIDE AND ALBUTEROL SULFATE 3 ML: 2.5; .5 SOLUTION RESPIRATORY (INHALATION) at 12:08

## 2022-08-06 RX ADMIN — MICONAZOLE NITRATE: 20 CREAM TOPICAL at 08:08

## 2022-08-06 RX ADMIN — IPRATROPIUM BROMIDE AND ALBUTEROL SULFATE 3 ML: 2.5; .5 SOLUTION RESPIRATORY (INHALATION) at 07:08

## 2022-08-06 RX ADMIN — LOSARTAN POTASSIUM 100 MG: 50 TABLET, FILM COATED ORAL at 08:08

## 2022-08-06 RX ADMIN — LEVOTHYROXINE SODIUM 50 MCG: 50 TABLET ORAL at 06:08

## 2022-08-06 RX ADMIN — HYPROMELLOSE 2910 1 DROP: 5 SOLUTION OPHTHALMIC at 05:08

## 2022-08-06 RX ADMIN — SALMETEROL XINAFOATE 1 PUFF: 50 POWDER, METERED ORAL; RESPIRATORY (INHALATION) at 07:08

## 2022-08-06 RX ADMIN — SALMETEROL XINAFOATE 1 PUFF: 50 POWDER, METERED ORAL; RESPIRATORY (INHALATION) at 08:08

## 2022-08-06 RX ADMIN — TIOTROPIUM BROMIDE INHALATION SPRAY 2 PUFF: 3.12 SPRAY, METERED RESPIRATORY (INHALATION) at 08:08

## 2022-08-06 RX ADMIN — RISPERIDONE 0.5 MG: 0.5 TABLET, ORALLY DISINTEGRATING ORAL at 08:08

## 2022-08-06 RX ADMIN — ENOXAPARIN SODIUM 40 MG: 40 INJECTION SUBCUTANEOUS at 05:08

## 2022-08-06 NOTE — ASSESSMENT & PLAN NOTE
HCTZ 25 mg QD, Losartan 100 mg QD        Additional Notes: Finishing 1st month - Starting 2nd month-  \\n\\n[facility] requisition form given for male blood work: CBC with diff, CMP, lipid panel\\n\\nPatient was registered

## 2022-08-06 NOTE — ASSESSMENT & PLAN NOTE
Ms. Arcos is a 89 yo that  according to daughter after having a 3-4 weeks ago COPD exacerbation, was started on Prednisone by her son who is a nurse. States  that after a few days she started noticing behavioral changes described as flight of ideas, insomnia, rapid speech, alternating irritability with excitability, grandiose delusions auditory hallucinations, fixation with allegories, numerology and Bahai.     Differential includes but not limited to drug induced delirium like from recent steroids use vs manic episode from mood disorder. Unlikely to be 2/2 to infection given normal WBC and lacks of infectious symptoms. UA is also negative. UDS is also negative. TSH is normal, low likely moran for thyroid condition     - Continue to follow Psych recs   - Continue to monitor mood and mental status   - Delirium precautions   - Holding further steroids     Scheduled Medication(s):  - Continue Depakote 125mg PO BID  - Continue scheduled Risperdal m-tabs (disintegrating) 0.5 mg PO QHS for acute sujata

## 2022-08-06 NOTE — ASSESSMENT & PLAN NOTE
Patient with Hypoxic Respiratory failure which is Acute on chronic.  she is not on home oxygen. Supplemental oxygen was provided and noted-  .   Signs/symptoms of respiratory failure include- tachypnea and increased work of breathing. Contributing diagnoses includes - COPD Labs and images were reviewed. Patient Has not had a recent ABG.

## 2022-08-06 NOTE — SUBJECTIVE & OBJECTIVE
Interval History: .   Family says she slept better and is looking better after holding depakote last night. Patient was doing respiratory therapy and was not altered until the end of the visit. She says she need a dr at her side [facility] and shes afraid that she wont make it out the hospital next week.    Review of Systems   Constitutional:  Positive for activity change.   Respiratory:  Positive for shortness of breath. Negative for cough and chest tightness.    Cardiovascular:  Negative for chest pain and leg swelling.   Neurological:  Positive for weakness.   Psychiatric/Behavioral:  Positive for confusion and dysphoric mood. Negative for decreased concentration.    Objective:     Vital Signs (Most Recent):  Temp: 97.4 °F (36.3 °C) (08/06/22 1533)  Pulse: 88 (08/06/22 1533)  Resp: 18 (08/06/22 1533)  BP: 130/62 (08/06/22 1533)  SpO2: (!) 91 % (08/06/22 1533)   Vital Signs (24h Range):  Temp:  [96.7 °F (35.9 °C)-98.6 °F (37 °C)] 97.4 °F (36.3 °C)  Pulse:  [86-97] 88  Resp:  [16-30] 18  SpO2:  [88 %-98 %] 91 %  BP: (112-150)/(55-68) 130/62     Weight: 80.3 kg (177 lb)  Body mass index is 31.35 kg/m².    Intake/Output Summary (Last 24 hours) at 8/6/2022 1644  Last data filed at 8/6/2022 0000  Gross per 24 hour   Intake --   Output 200 ml   Net -200 ml        Physical Exam  Constitutional:       General: She is not in acute distress.     Comments: In bed sleeping and somnolent when called   HENT:      Head: Normocephalic and atraumatic.      Nose: No congestion or rhinorrhea.      Mouth/Throat:      Mouth: Mucous membranes are dry.      Dentition: Does not have dentures.   Cardiovascular:      Rate and Rhythm: Normal rate.      Pulses: Normal pulses.      Heart sounds: No murmur heard.    No friction rub. No gallop.   Pulmonary:      Effort: Pulmonary effort is normal.      Breath sounds: Wheezing present. No rhonchi.      Comments: Unable to take deep breaths  Abdominal:      General: There is no distension.       Palpations: There is no mass.   Musculoskeletal:         General: No swelling or tenderness.   Lymphadenopathy:      Cervical: No cervical adenopathy.   Skin:     General: Skin is warm.   Neurological:      Mental Status: She is disoriented.      Motor: Weakness present.   Psychiatric:      Comments: Patient seems less anxious than previous days, normal respiratory rate. Mood is labile.       Significant Labs: All pertinent labs within the past 24 hours have been reviewed.  CBC:   Recent Labs   Lab 08/05/22  0452   WBC 8.59   HGB 11.5*   HCT 39.0          CMP:   Recent Labs   Lab 08/05/22  0452      K 4.7   CL 98   CO2 33*   *   BUN 34*   CREATININE 1.1   CALCIUM 9.3   PROT 5.9*   ALBUMIN 2.6*   BILITOT 0.4   ALKPHOS 76   AST 13   ALT 10   ANIONGAP 11         Significant Imaging: I have reviewed all pertinent imaging results/findings within the past 24 hours.

## 2022-08-06 NOTE — PLAN OF CARE
Patient O2 saturation monitored throughout shift via [employer] mobile monitor. Sleeping between care. Reoriented to situation. Family to return to bedside in AM. Will continue to monitor.     Problem: Adult Inpatient Plan of Care  Goal: Plan of Care Review  Outcome: Ongoing, Progressing  Goal: Patient-Specific Goal (Individualized)  Outcome: Ongoing, Progressing  Goal: Absence of Hospital-Acquired Illness or Injury  Outcome: Ongoing, Progressing  Goal: Optimal Comfort and Wellbeing  Outcome: Ongoing, Progressing  Goal: Readiness for Transition of Care  Outcome: Ongoing, Progressing     Problem: Fall Injury Risk  Goal: Absence of Fall and Fall-Related Injury  Outcome: Ongoing, Progressing     Problem: Skin Injury Risk Increased  Goal: Skin Health and Integrity  Outcome: Ongoing, Progressing

## 2022-08-06 NOTE — PROGRESS NOTES
"[facility]  Progress Note    Patient Name: Carmina Arcos  MRN: [id number]  Patient Class: IP- Inpatient   Admission Date: 7/24/2022  Length of Stay: 5 days  Attending Physician: Dc Muñoz MD  Primary Care Provider: Dick Graham MD        Subjective:     Principal Problem:Acute psychosis        HPI:  History provided mostly by daughter Nimco. Ms Grewal is a 89 yo that  according to daughter after having a 3-4 weeks ago COPD exacerbation, was started on Prednisone by her son who is a nurse. States  that after a few days she started noticing behavioral changes described as flight of ideas, insomnia, rapid speech, alternating irritability with excitability, grandiose delusions auditory hallucinations, fixation with allegories, numerology and Mandaen.  Daughter also affirms, reckless spending. She was taken to [facility] but there was no psych service available. [place] decided [place] [facility] [place].  States previous episode precipitated by "diet pills " in 1999 and had to be committed to [facility] for a week. There she was started on risperidone, its unclear for how long she was on it. Daughter states that the patient also has a history of depression with ne suicide attempt.    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 She currently was living on her own and managed rental property, her family lives close to her home and visit regularly.    PMH: Breast Cancer? Colon Cancer. HTN treated with losartan hydrochlorothiazide and amlodipine, Hypothyroidism treated with levothyroxine, COPD treated with with formoterol-glycopyrrolate , albuterol PRN and Sildenafil given by his son.    Surgical hx positive for bilateral knee replacement, tonsillectomy, hysterectomy, right lumpectomy and colon surgery .    Allergies: Penicillin      Overview/Hospital Course:  Ms. Arcos was admitted on 7/24 for " acute manic symptoms. She was started on a regimen of Seroquel QHS but then switched to Risperidone and depakote per psych recommendations. On 7/31, she developed increasing dyspnea and ABGs indicated a respiratory acidosis with CO2 retention. Azithromycin was prescribed for anti-inflammation in the setting of COPD. Patient is awaiting placement.       Interval History: .   Family says she slept better and is looking better after holding depakote last night. Patient was doing respiratory therapy and was not altered until the end of the visit. [facility] and shes afraid that she wont make it out the hospital next week.    Review of Systems   Constitutional:  Positive for activity change.   Respiratory:  Positive for shortness of breath. Negative for cough and chest tightness.    Cardiovascular:  Negative for chest pain and leg swelling.   Neurological:  Positive for weakness.   Psychiatric/Behavioral:  Positive for confusion and dysphoric mood. Negative for decreased concentration.    Objective:     Vital Signs (Most Recent):  Temp: 97.4 °F (36.3 °C) (08/06/22 1533)  Pulse: 88 (08/06/22 1533)  Resp: 18 (08/06/22 1533)  BP: 130/62 (08/06/22 1533)  SpO2: (!) 91 % (08/06/22 1533)   Vital Signs (24h Range):  Temp:  [96.7 °F (35.9 °C)-98.6 °F (37 °C)] 97.4 °F (36.3 °C)  Pulse:  [86-97] 88  Resp:  [16-30] 18  SpO2:  [88 %-98 %] 91 %  BP: (112-150)/(55-68) 130/62     Weight: 80.3 kg (177 lb)  Body mass index is 31.35 kg/m².    Intake/Output Summary (Last 24 hours) at 8/6/2022 1644  Last data filed at 8/6/2022 0000  Gross per 24 hour   Intake --   Output 200 ml   Net -200 ml        Physical Exam  Constitutional:       General: She is not in acute distress.     Comments: In bed sleeping and somnolent when called   HENT:      Head: Normocephalic and atraumatic.      Nose: No congestion or rhinorrhea.      Mouth/Throat:      Mouth: Mucous membranes are dry.      Dentition: Does not have dentures.    Cardiovascular:      Rate and Rhythm: Normal rate.      Pulses: Normal pulses.      Heart sounds: No murmur heard.    No friction rub. No gallop.   Pulmonary:      Effort: Pulmonary effort is normal.      Breath sounds: Wheezing present. No rhonchi.      Comments: Unable to take deep breaths  Abdominal:      General: There is no distension.      Palpations: There is no mass.   Musculoskeletal:         General: No swelling or tenderness.   Lymphadenopathy:      Cervical: No cervical adenopathy.   Skin:     General: Skin is warm.   Neurological:      Mental Status: She is disoriented.      Motor: Weakness present.   Psychiatric:      Comments: Patient seems less anxious than previous days, normal respiratory rate. Mood is labile.       Significant Labs: All pertinent labs within the past 24 hours have been reviewed.  CBC:   Recent Labs   Lab 08/05/22 0452   WBC 8.59   HGB 11.5*   HCT 39.0          CMP:   Recent Labs   Lab 08/05/22 0452      K 4.7   CL 98   CO2 33*   *   BUN 34*   CREATININE 1.1   CALCIUM 9.3   PROT 5.9*   ALBUMIN 2.6*   BILITOT 0.4   ALKPHOS 76   AST 13   ALT 10   ANIONGAP 11         Significant Imaging: I have reviewed all pertinent imaging results/findings within the past 24 hours.      Assessment/Plan:      * Acute psychosis  Ms. Arcos is a 89 yo that  according to daughter after having a 3-4 weeks ago COPD exacerbation, was started on Prednisone by her son who is a nurse. States  that after a few days she started noticing behavioral changes described as flight of ideas, insomnia, rapid speech, alternating irritability with excitability, grandiose delusions auditory hallucinations, fixation with allegories, numerology and Anglican.     Differential includes but not limited to drug induced delirium like from recent steroids use vs manic episode from mood disorder. Unlikely to be 2/2 to infection given normal WBC and lacks of infectious symptoms. UA is also negative. UDS is  also negative. TSH is normal, low likely moran for thyroid condition     - Continue to follow Psych recs   - Continue to monitor mood and mental status   - Delirium precautions   - Holding further steroids     Scheduled Medication(s):  - Continue Depakote 125mg PO BID  - Continue scheduled Risperdal m-tabs (disintegrating) 0.5 mg PO QHS for acute sujata           Acute hypoxemic respiratory failure  Patient with Hypoxic Respiratory failure which is Acute on chronic.  she is not on home oxygen. Supplemental oxygen was provided and noted-  .   Signs/symptoms of respiratory failure include- tachypnea and increased work of breathing. Contributing diagnoses includes - COPD Labs and images were reviewed. Patient Has not had a recent ABG.     Impaired mobility and ADLs  PT/OT recommending SNF  Awaiting placement    COPD exacerbation    -Avoid Steroid nebulizer.  -patient has refused nebulization, agrees to do them today  -Duonebs q6  - Continue salmeterol and tiotropium      Mood disorder due to a general medical condition  See acute psychosis    Sensorineural hearing loss (SNHL) of both ears        Dysphagia    Soft mechanical food in diet    Hypertension  HCTZ 25 mg QD, Losartan 100 mg QD           VTE Risk Mitigation (From admission, onward)         Ordered     enoxaparin injection 40 mg  Daily         07/24/22 1332     IP VTE HIGH RISK PATIENT  Once         07/24/22 1332     Place sequential compression device  Until discontinued         07/24/22 1332                Discharge Planning   ANA: 8/9/2022     Code Status: Full Code   Is the patient medically ready for discharge?: No    Reason for patient still in hospital (select all that apply): Pending disposition  Discharge Plan A: Home Health   Discharge Delays: None known at this time              Gadiel Grajeda MD  Department of Hospital Medicine   [facility]

## 2022-08-07 LAB
ALBUMIN SERPL BCP-MCNC: 2.5 G/DL (ref 3.5–5.2)
ALBUMIN SERPL BCP-MCNC: 2.5 G/DL (ref 3.5–5.2)
ALP SERPL-CCNC: 69 U/L (ref 55–135)
ALT SERPL W/O P-5'-P-CCNC: 10 U/L (ref 10–44)
ANION GAP SERPL CALC-SCNC: 4 MMOL/L (ref 8–16)
ANION GAP SERPL CALC-SCNC: 4 MMOL/L (ref 8–16)
AST SERPL-CCNC: 13 U/L (ref 10–40)
BASOPHILS # BLD AUTO: 0.06 K/UL (ref 0–0.2)
BASOPHILS NFR BLD: 1 % (ref 0–1.9)
BILIRUB SERPL-MCNC: 0.4 MG/DL (ref 0.1–1)
BUN SERPL-MCNC: 28 MG/DL (ref 10–30)
BUN SERPL-MCNC: 28 MG/DL (ref 10–30)
CALCIUM SERPL-MCNC: 9.7 MG/DL (ref 8.7–10.5)
CALCIUM SERPL-MCNC: 9.7 MG/DL (ref 8.7–10.5)
CHLORIDE SERPL-SCNC: 98 MMOL/L (ref 95–110)
CHLORIDE SERPL-SCNC: 98 MMOL/L (ref 95–110)
CO2 SERPL-SCNC: 40 MMOL/L (ref 23–29)
CO2 SERPL-SCNC: 40 MMOL/L (ref 23–29)
CREAT SERPL-MCNC: 0.7 MG/DL (ref 0.5–1.4)
CREAT SERPL-MCNC: 0.7 MG/DL (ref 0.5–1.4)
DIFFERENTIAL METHOD: ABNORMAL
EOSINOPHIL # BLD AUTO: 0.3 K/UL (ref 0–0.5)
EOSINOPHIL NFR BLD: 4.8 % (ref 0–8)
ERYTHROCYTE [DISTWIDTH] IN BLOOD BY AUTOMATED COUNT: 13.3 % (ref 11.5–14.5)
EST. GFR  (NO RACE VARIABLE): >60 ML/MIN/1.73 M^2
EST. GFR  (NO RACE VARIABLE): >60 ML/MIN/1.73 M^2
GLUCOSE SERPL-MCNC: 157 MG/DL (ref 70–110)
GLUCOSE SERPL-MCNC: 157 MG/DL (ref 70–110)
HCT VFR BLD AUTO: 37 % (ref 37–48.5)
HGB BLD-MCNC: 11.1 G/DL (ref 12–16)
IMM GRANULOCYTES # BLD AUTO: 0.04 K/UL (ref 0–0.04)
IMM GRANULOCYTES NFR BLD AUTO: 0.7 % (ref 0–0.5)
LYMPHOCYTES # BLD AUTO: 0.8 K/UL (ref 1–4.8)
LYMPHOCYTES NFR BLD: 12.8 % (ref 18–48)
MAGNESIUM SERPL-MCNC: 2.4 MG/DL (ref 1.6–2.6)
MCH RBC QN AUTO: 28.2 PG (ref 27–31)
MCHC RBC AUTO-ENTMCNC: 30 G/DL (ref 32–36)
MCV RBC AUTO: 94 FL (ref 82–98)
MONOCYTES # BLD AUTO: 0.6 K/UL (ref 0.3–1)
MONOCYTES NFR BLD: 10.3 % (ref 4–15)
NEUTROPHILS # BLD AUTO: 4.3 K/UL (ref 1.8–7.7)
NEUTROPHILS NFR BLD: 70.4 % (ref 38–73)
NRBC BLD-RTO: 0 /100 WBC
PHOSPHATE SERPL-MCNC: 2.8 MG/DL (ref 2.7–4.5)
PHOSPHATE SERPL-MCNC: 2.8 MG/DL (ref 2.7–4.5)
PLATELET # BLD AUTO: 181 K/UL (ref 150–450)
PMV BLD AUTO: 10.9 FL (ref 9.2–12.9)
POTASSIUM SERPL-SCNC: 4.8 MMOL/L (ref 3.5–5.1)
POTASSIUM SERPL-SCNC: 4.8 MMOL/L (ref 3.5–5.1)
PROT SERPL-MCNC: 5.9 G/DL (ref 6–8.4)
RBC # BLD AUTO: 3.93 M/UL (ref 4–5.4)
SODIUM SERPL-SCNC: 142 MMOL/L (ref 136–145)
SODIUM SERPL-SCNC: 142 MMOL/L (ref 136–145)
WBC # BLD AUTO: 6.09 K/UL (ref 3.9–12.7)

## 2022-08-07 PROCEDURE — 80053 COMPREHEN METABOLIC PANEL: CPT | Performed by: STUDENT IN AN ORGANIZED HEALTH CARE EDUCATION/TRAINING PROGRAM

## 2022-08-07 PROCEDURE — 99900035 HC TECH TIME PER 15 MIN (STAT)

## 2022-08-07 PROCEDURE — 63600175 PHARM REV CODE 636 W HCPCS: Performed by: STUDENT IN AN ORGANIZED HEALTH CARE EDUCATION/TRAINING PROGRAM

## 2022-08-07 PROCEDURE — 25000242 PHARM REV CODE 250 ALT 637 W/ HCPCS: Performed by: STUDENT IN AN ORGANIZED HEALTH CARE EDUCATION/TRAINING PROGRAM

## 2022-08-07 PROCEDURE — 94640 AIRWAY INHALATION TREATMENT: CPT

## 2022-08-07 PROCEDURE — 99232 PR SUBSEQUENT HOSPITAL CARE,LEVL II: ICD-10-PCS | Mod: GC,,, | Performed by: HOSPITALIST

## 2022-08-07 PROCEDURE — 99232 SBSQ HOSP IP/OBS MODERATE 35: CPT | Mod: GC,,, | Performed by: HOSPITALIST

## 2022-08-07 PROCEDURE — 25000003 PHARM REV CODE 250: Performed by: HOSPITALIST

## 2022-08-07 PROCEDURE — 25000003 PHARM REV CODE 250: Performed by: STUDENT IN AN ORGANIZED HEALTH CARE EDUCATION/TRAINING PROGRAM

## 2022-08-07 PROCEDURE — 83735 ASSAY OF MAGNESIUM: CPT | Performed by: STUDENT IN AN ORGANIZED HEALTH CARE EDUCATION/TRAINING PROGRAM

## 2022-08-07 PROCEDURE — 25000003 PHARM REV CODE 250

## 2022-08-07 PROCEDURE — 94761 N-INVAS EAR/PLS OXIMETRY MLT: CPT

## 2022-08-07 PROCEDURE — 36415 COLL VENOUS BLD VENIPUNCTURE: CPT | Performed by: STUDENT IN AN ORGANIZED HEALTH CARE EDUCATION/TRAINING PROGRAM

## 2022-08-07 PROCEDURE — 27000221 HC OXYGEN, UP TO 24 HOURS

## 2022-08-07 PROCEDURE — 11000001 HC ACUTE MED/SURG PRIVATE ROOM

## 2022-08-07 PROCEDURE — 85025 COMPLETE CBC W/AUTO DIFF WBC: CPT | Performed by: STUDENT IN AN ORGANIZED HEALTH CARE EDUCATION/TRAINING PROGRAM

## 2022-08-07 PROCEDURE — 84100 ASSAY OF PHOSPHORUS: CPT | Performed by: STUDENT IN AN ORGANIZED HEALTH CARE EDUCATION/TRAINING PROGRAM

## 2022-08-07 PROCEDURE — 25000242 PHARM REV CODE 250 ALT 637 W/ HCPCS: Performed by: HOSPITALIST

## 2022-08-07 RX ORDER — IPRATROPIUM BROMIDE AND ALBUTEROL SULFATE 2.5; .5 MG/3ML; MG/3ML
3 SOLUTION RESPIRATORY (INHALATION)
Status: DISCONTINUED | OUTPATIENT
Start: 2022-08-07 | End: 2022-08-08

## 2022-08-07 RX ADMIN — TIOTROPIUM BROMIDE INHALATION SPRAY 2 PUFF: 3.12 SPRAY, METERED RESPIRATORY (INHALATION) at 08:08

## 2022-08-07 RX ADMIN — IPRATROPIUM BROMIDE AND ALBUTEROL SULFATE 3 ML: 2.5; .5 SOLUTION RESPIRATORY (INHALATION) at 08:08

## 2022-08-07 RX ADMIN — ENOXAPARIN SODIUM 40 MG: 40 INJECTION SUBCUTANEOUS at 04:08

## 2022-08-07 RX ADMIN — IPRATROPIUM BROMIDE AND ALBUTEROL SULFATE 3 ML: 2.5; .5 SOLUTION RESPIRATORY (INHALATION) at 02:08

## 2022-08-07 RX ADMIN — SALMETEROL XINAFOATE 1 PUFF: 50 POWDER, METERED ORAL; RESPIRATORY (INHALATION) at 09:08

## 2022-08-07 RX ADMIN — HYPROMELLOSE 2910 1 DROP: 5 SOLUTION OPHTHALMIC at 08:08

## 2022-08-07 RX ADMIN — HYPROMELLOSE 2910 1 DROP: 5 SOLUTION OPHTHALMIC at 04:08

## 2022-08-07 RX ADMIN — SALMETEROL XINAFOATE 1 PUFF: 50 POWDER, METERED ORAL; RESPIRATORY (INHALATION) at 08:08

## 2022-08-07 RX ADMIN — LOSARTAN POTASSIUM 100 MG: 50 TABLET, FILM COATED ORAL at 08:08

## 2022-08-07 RX ADMIN — IPRATROPIUM BROMIDE AND ALBUTEROL SULFATE 3 ML: 2.5; .5 SOLUTION RESPIRATORY (INHALATION) at 09:08

## 2022-08-07 RX ADMIN — RISPERIDONE 0.5 MG: 0.5 TABLET, ORALLY DISINTEGRATING ORAL at 08:08

## 2022-08-07 RX ADMIN — ACETAMINOPHEN 650 MG: 325 TABLET ORAL at 08:08

## 2022-08-07 RX ADMIN — MICONAZOLE NITRATE: 20 CREAM TOPICAL at 08:08

## 2022-08-07 RX ADMIN — LEVOTHYROXINE SODIUM 50 MCG: 50 TABLET ORAL at 05:08

## 2022-08-07 RX ADMIN — DIVALPROEX SODIUM 125 MG: 125 CAPSULE, COATED PELLETS ORAL at 08:08

## 2022-08-07 NOTE — PLAN OF CARE
Patient had an uneventful day  Problem: Adult Inpatient Plan of Care  Goal: Plan of Care Review  Outcome: Ongoing, Progressing  Goal: Patient-Specific Goal (Individualized)  Outcome: Ongoing, Progressing  Goal: Absence of Hospital-Acquired Illness or Injury  Outcome: Ongoing, Progressing  Goal: Optimal Comfort and Wellbeing  Outcome: Ongoing, Progressing  Goal: Readiness for Transition of Care  Outcome: Ongoing, Progressing     Problem: Fall Injury Risk  Goal: Absence of Fall and Fall-Related Injury  Outcome: Ongoing, Progressing     Problem: Skin Injury Risk Increased  Goal: Skin Health and Integrity  Outcome: Ongoing, Progressing

## 2022-08-07 NOTE — PLAN OF CARE
"  Problem: Adult Inpatient Plan of Care  Goal: Plan of Care Review  Outcome: Ongoing, Progressing  Goal: Absence of Hospital-Acquired Illness or Injury  Outcome: Ongoing, Progressing  Goal: Optimal Comfort and Wellbeing  Outcome: Ongoing, Progressing     Problem: Fall Injury Risk  Goal: Absence of Fall and Fall-Related Injury  Outcome: Ongoing, Progressing     Pt anxious during the night. Asking this nurse not to leave her and if I leave her "God will take her" and "she will die" pt was easily redirected. Slept in between care. Safety precautions maintained. Will continue to monitor.   "

## 2022-08-07 NOTE — ASSESSMENT & PLAN NOTE
Patient with Hypoxic Respiratory failure which is Acute on chronic.  she is not on home oxygen. Supplemental oxygen was provided and noted-  .

## 2022-08-07 NOTE — ASSESSMENT & PLAN NOTE
Ms. Arcos is a 89 yo that  according to daughter after having a 3-4 weeks ago COPD exacerbation, was started on Prednisone by her son who is a nurse. States  that after a few days she started noticing behavioral changes described as flight of ideas, insomnia, rapid speech, alternating irritability with excitability, grandiose delusions auditory hallucinations, fixation with allegories, numerology and Moravian.     Differential includes but not limited to drug induced delirium like from recent steroids use vs manic episode from mood disorder. Unlikely to be 2/2 to infection given normal WBC and lacks of infectious symptoms. UA is also negative. UDS is also negative. TSH is normal, low likely moran for thyroid condition     - Continue to follow Psych recs   - Continue to monitor mood and mental status   - Delirium precautions   - Holding further steroids     Scheduled Medication(s):  - Continue Depakote 125mg PO BID  - Continue scheduled Risperdal m-tabs (disintegrating) 0.5 mg PO QHS for acute sujata

## 2022-08-07 NOTE — ASSESSMENT & PLAN NOTE
ASSESSMENT     Carmina Arcos is a 89 y.o. female with a past psychiatric history of SIPD, who presented to the [facility] due to manic behavior. Her symptoms of insomnia, hyperactivity, impulsivity, and delusions are likely 2/2 recent course of steroids. However, these symptoms are endangering her and have been difficult for her family to manage, especially due to the delay in seeing an outpatient provider. Pt's hospital course has been complicated by new somnolence and hypercapneic respiratory failure. Primary team initiated infectious workup.    IMPRESSION  Substance-induced psychotic disorder  R/o bipolar disorder    RECOMMENDATION(S)      1. Scheduled Medication(s):  - Continue Depakote 125mg PO BID  - Recommend scheduled Risperdal m-tabs (disintegrating) 0.5 mg PO QHS for acute sujata  - Can give Zyprexa 1.25 mg IM if refusing PO Risperdal  - Agree with starting Melatonin 9 mg PO QHS  - Primary team discussed starting Trazodone - we feel that the risks of orthostatic hypotension outweigh the potential benefit, which is likely minimal in this acutely manic patient    2. PRN Medication(s):  - Continue Risperdal 0.5 mg PO q8hrs PRN non-redirectable agitation  - Continue PRN Zyprexa 2.5 mg PO or IM to 1.25 mg q8hrs PRN    3.  Monitor:  - Please obtain daily EKG to monitor QTc    4. Legal Status/Precaution(s):  - Patient does not meet criteria for PEC at this time. Patient does appear gravely disabled due to a psychiatric illness but is help-seeking and not contesting her admission. She may not have full capacity to refuse meds at this time given her altered mentation.  - Recommend fall precautions and strict bed rest with alarm on, low bed height, to avoid falls due to potential orthostatic hypotension secondary to Risperdal in this elderly patient.

## 2022-08-07 NOTE — ASSESSMENT & PLAN NOTE
Currently with normal respiratory effort  -Avoid Steroid nebulizer.  -patient has refused nebulization, agrees to do them today  -Duonebs during the day to not disturb her sleep     oral

## 2022-08-07 NOTE — PROGRESS NOTES
"[facility] Surg  Psychiatry  Progress Note    Patient Name: Carmina Arcos  MRN: [id number]   Code Status: Full Code  Admission Date: 7/24/2022  Hospital Length of Stay: 5 days  Expected Discharge Date: 8/9/2022  Attending Physician: Dc Muñoz MD  Primary Care Provider: Dick Graham MD    Current Legal Status: Uncontested    Patient information was obtained from patient and ER records.       Subjective:     Patient is a 89 y.o., female, presents with:    Principal Problem:Acute psychosis    Chief Complaint: AMS    HPI: Carmina Arcos is a 89 y.o. female with a past psychiatric history of substance-induced psychotic disorder who presented to [facility] due to Manic behavior. Psychiatry was consulted for "sujata x several days, multiple ED visits".    Per Primary Team:  89 yo W with pmhx COPD, anxiety, depression, HTN, breast CA presents with family for psych evaluation.  History is assisted by the patient and the patient's daughter.  They note that patient was having coughing flares of COPD 3 weeks ago and she was started on course of prednisone by a family member.  She was on the prednisone for almost 2 weeks.  The prednisone has since discontinued that she continues to have manic symptoms including insomnia, reckless spending behavior, typical references Septra.  She was seen in the emergency department several days ago and discharged home with close observation by family members and outpatient psychiatry follow-up.  That follow-up is in 6 days.  Family states that she will need a sooner psychiatric evaluation.  She did have history of manic episode many years ago in the setting of taking a diet pill.  She was admitted inpatient time.  The patient does report some foul-smelling odor in her urine and is concerned that she has a kidney infection.  Otherwise she is essentially without complaints.     The history is provided by the patient and a relative. No  was used.     Per " "Psychiatry:  Patient was seen in the ED with daughter at bedside; pt and daughter provided history together. Pt was alert, oriented x4, rapid pressured interruptive speech, euthymic affect. They report about a week of psychiatric symptoms including excitability/irritability, anxiety, insomnia, hyperactivity, visual hallucinations, hyperreligious and grandiose delusions. Pt has been preoccupied with numerology and believes her family is part of a biblical story and that she has been in the [facility] with Lázaro. She has also been impulsively shopping and spending large amounts of money. She has intermittent confusion, FOI, BALA on exam.    Pt has a history of COPD and started a tapered 2-wk course of PO Prednisone for a presumed exacerbation, which she got from her son (a nurse and RT). Before the course was completed, the pt's psychiatric symptoms started as described above. Family was concerned that the Prednisone may be causing her symptoms but they could not convince her to stop taking it. They presented to the ED several times since then and were initially comfortable with outpatient follow-up with pt's former psychiatrist, Dr. Ceballos, on Friday 7/29. However, they have become increasingly concerned with pt's symptoms and feel she may be a danger to herself.     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 She speaks several languages and has no known history of cognitive decline. Her psychiatric history includes one substance-induced psychotic episode in 1999. She took some stimulant diet pills, developed insomnia and visual hallucinations, and was admitted to [facility]. She was started on Risperdal and Zoloft, which she continued to see Dr. Ceballos for for some time. Eventually her symptoms resolved and she went back to her "relatively high-strung" baseline, per daughter. She has been out of Dr. Ceballos's care and not having psychiatric " "issues for the past 10+ years. Pt's daughter also reports a remote suicide attempt over 30 yrs ago, via Benadryl overdose.    Prior to the course of Prednisone, pt was at baseline with none of her current symptoms. She last took Prednisone on Wednesday 7/20 but the symptoms have persisted. Pt and family are amenable to inpt hospitalization for her current symptoms.    Psychiatric Review of Systems-is patient experiencing or having changes in  sleep: yes, insomnia  appetite: no  weight: no  energy/anergy: yes, hyperactive  interest/pleasure/anhedonia: no  somatic symptoms: no  libido: no  anxiety/panic: yes  guilty/hopelessness: no  concentration: yes  S.I.B.s/risky behavior: yes, impulsive shopping  any drugs: no  alcohol: no     Allergies:  Penicillins      Hospital Course:   Initiated Seroquel 50 mg PO QHS overnight on 7/25, but had poor sleep, frequently getting up from bed and becoming agitated regarding delusional thought content (per daughter and nursing). Labile affect with hyperreligious thoughts. Disoriented to time, place. Seroquel was siwtched to risperdal 0.5mg qhs on 7/26, continued to be hyperreligious but AOx4. Intermittently agitated overnight on 7/27, with incident during which she fell at 3AM as she frequently got out of bed without assistance. Became physically threatening with staff in the AM. Continued to have minimal sleep.     7/28/22:  Overnight, pt had another fall/weakness episode requiring assistance to return to bed from bathroom. She received scheduled Risperdal 1 mg PO scheduled and 0.5 mg PRN @ 0216 for agitation. Pt reportedly did not respond to the PRN medication and continued to be elevated, irritable, and not sleeping. This morning, pt was sleeping but rousable for interview. She continued to be irritable and hyperreligious, referring to her "visions" and sleeping when God wants her to. She was oriented x3 and denied complaints other than wanting to sleep and being tired of getting " "interrupted. Interview terminated due to pt's irritability.    7/29/22:  Patient was agitated yesterday, frequently screaming, crying, and calling family members to discuss Confucianism delusions. She received PRN IM Zyprexa 2.5 mg yesterday afternoon and slept for several hours afterward. She received her first dose of QHS VPA yesterday and slept well throughout the night, per nursing. Pt was sleeping and difficult to arouse this morning but participated in brief psychiatric interview. She was oriented to time/place and endorsed good sleep overnight. Recalls recent events (daughter bringing baked goods to bedside). She reported she was still having her "visions" but said they were "allegory", not real. Interview terminated due to pt's drowsiness. Per nursing, pt was conversant/oriented this morning with son-in-law at bedside, both remarked how much calmer she was today than previous days.    7/31/22: Patient resting in bed with family at bedside, minimally interactive with eyes closed throughout most of interaction. Described the year as "2023" and when informed it was 2022, denied and stated that it was 2023. Perseverated on saying "they weren't telling the truth," and when asked who "they" were, she continuously stated "everyone." Reportedly not given risperdal and depakote d/t confusion and inability to swallow.    8/1/22:  Patient was frequently somnolent and intermittently unresponsive over the weekend, suspected to be 2/2 CO2 narcosis (confirmed via ABG yesterday, improved with Bipap). Per chart, she did not receive PO psych meds over the weekend due to difficulty swallowing and poor mentation. Her PO Depakote was switched to IV Depakon 125 BID. She was seen sleeping, upright in bed this morning, difficult to rouse and to keep alert/attentive. Frequently falling asleep and mumbling incoherently but also answering appropriately at times; oriented x4. Her family at bedside is concerned about her breathing as well " "as her ongoing irritability and refusal to cooperate with care at times.     8/2/22:  Slept poorly overnight per daughter in room and per nursing notes. Patient reports she slept fine. Not wearing O2 at this time. Says that if she wears it she will die. Cannot explain why - instead tells me to "fast forward, you'll find out." Oriented to year, month, place, self. Hurrying me along - "Go ahead ask another question doctor" - "Can you hurry and ask your questions?" Daughter says she is rushing me because then she doesn't have to try and organize her thoughts.      8/3/22:  Slept a bit better overnight with risperdal. Received 0.5mg dose. Daughter in room states patient is looking better today. Walked to the bathroom with assistance today. States she wants to go home. Minimal participation in interview at this time. Trying to catch up on sleep. Does know that grass requires "water and sun" to grow, oriented with prompting. Mood is good.     8/4/22:  Slept 6-8 hours last night per son. Taking risperdal and depakote as prescribed. Agitation improved, but remains somewhat irritable and intermittently confused. Oriented to self place and year. Hurrying the conversation with her daughter. Encouraged her to put her nasal cannula in, which she briefly did and then said "I can't!" and removed it. Says her mood is good.      8/5/22:  Slept well overnight again. Taking risperdal and depakote as prescribed. Agitation improved, but remains somewhat irritable and intermittently confused. On interview this AM she had just had an attempted blood draw and was in some distress so did not participate meaningfully in interview.  Seen again with staff this afternoon. She is SAVEAHAART positive at this time. Oriented to self, place, month, year. Unable to say what a train and bike have in common.    8/6/22:  Chart reviewed. ARGELIA. Medication compliant and no behavioral PRNs required. Patient greeted at bedside, oriented to person, place, " "month, year, day of week, current events. Reports her mood is "more optimistic" today. Says she visited with both her grandson and great-grandson who came to see her today. She is calm, grossly linear in thought process, and pleasant. Endorses some mild paranoia at times. Otherwise denies SI/HI/AVH.         Family History    None       Tobacco Use    Smoking status: Current Every Day Smoker     Types: Cigarettes    Smokeless tobacco: Never Used    Tobacco comment: smoked for 30-35 years per daughter quit many years ago   Substance and Sexual Activity    Alcohol use: Yes     Comment: socially less than one a month    Drug use: No    Sexual activity: Not Currently     Psychotherapeutics (From admission, onward)                Start     Stop Route Frequency Ordered    08/02/22 2230  risperiDONE disintegrating tablet 0.5 mg         -- Oral Nightly 08/02/22 2117 07/29/22 1727  OLANZapine injection 1.3 mg         -- IM Every 8 hours PRN 07/29/22 1727               Objective:     Vital Signs (Most Recent):  Temp: 98.4 °F (36.9 °C) (08/02/22 2100)  Pulse: 90 (08/03/22 0815)  Resp: 19 (08/03/22 0815)  BP: 136/80 (08/03/22 0400)  SpO2: (!) 92 % (08/03/22 0815)   Vital Signs (24h Range):  Temp:  [98.4 °F (36.9 °C)-98.6 °F (37 °C)] 98.4 °F (36.9 °C)  Pulse:  [] 90  Resp:  [14-29] 19  SpO2:  [92 %-99 %] 92 %  BP: (115-136)/(56-80) 136/80     Height: 5' 3" (160 cm)  Weight: 80.3 kg (177 lb)  Body mass index is 31.35 kg/m².      Intake/Output Summary (Last 24 hours) at 8/3/2022 1326  Last data filed at 8/3/2022 0300  Gross per 24 hour   Intake 100 ml   Output 750 ml   Net -650 ml     Mental Status Exam:  Appearance: age appropriate, well nourished, casually dressed, lying in bed  Behavior/Cooperation: normal, cooperative  Speech: slurred, normal rate, normal pitch, normal volume  Mood: good  Affect: constricted  Thought Process: loose associations  Thought Content: delusions: yes, grandiose and hyperreligious. No " SI/HI/AVH.    Orientation: person, place, situation  Memory: Recent and remote intact  Attention Span/Concentration: Impaired  Insight: intact  Judgment: limited    Significant Labs: Last 24 Hours:   Recent Lab Results         08/03/22  1004   08/03/22  0346        Albumin   2.8       Alkaline Phosphatase   78       ALT   11       Anion Gap   8       Appearance, UA Hazy         AST   13       Baso #   0.04       Basophil %   0.7       Bilirubin (UA) Negative         BILIRUBIN TOTAL   0.5  Comment: For infants and newborns, interpretation of results should be based  on gestational age, weight and in agreement with clinical  observations.    Premature Infant recommended reference ranges:  Up to 24 hours.............<8.0 mg/dL  Up to 48 hours............<12.0 mg/dL  3-5 days..................<15.0 mg/dL  6-29 days.................<15.0 mg/dL         BUN   14       Calcium   9.2       Chloride   101       CO2   36       Color, UA Kathryn         Creatinine   0.6       Differential Method   Automated       eGFR   >60.0       Eos #   0.2       Eosinophil %   4.0       Glucose   129       Glucose, UA 2+         Gran # (ANC)   4.4       Gran %   73.3       Hematocrit   39.6       Hemoglobin   11.9       Immature Grans (Abs)   0.02  Comment: Mild elevation in immature granulocytes is non specific and   can be seen in a variety of conditions including stress response,   acute inflammation, trauma and pregnancy. Correlation with other   laboratory and clinical findings is essential.         Immature Granulocytes   0.3       Ketones, UA 1+         Leukocytes, UA Negative         Lymph #   0.8       Lymph %   12.7       Magnesium   2.4       MCH   27.5       MCHC   30.1       MCV   92       Mono #   0.5       Mono %   9.0       MPV   11.1       NITRITE UA Negative         nRBC   0       Occult Blood UA Negative         pH, UA 5.0         Phosphorus   3.9       Platelet Estimate   Appears normal       Platelets   135        Potassium   4.5       PROTEIN TOTAL   6.0       Protein, UA Negative  Comment: Recommend a 24 hour urine protein or a urine   protein/creatinine ratio if globulin induced proteinuria is  clinically suspected.           RBC   4.32       RDW   13.2       Sodium   145       Specific [place], UA 1.030         Specimen UA Urine, Clean Catch         WBC   6.00                Scheduled Medications:   albuterol-ipratropium  3 mL Nebulization Q6H    artificial tears  1 drop Both Eyes TID    divalproex  125 mg Oral QHS    enoxaparin  40 mg Subcutaneous Daily    levothyroxine  50 mcg Oral Before breakfast    losartan  100 mg Oral Daily    miconazole   Topical (Top) BID    risperiDONE  0.5 mg Oral QHS    salmeteroL  1 puff Inhalation BID    tiotropium bromide  2 puff Inhalation Daily       PRN Medications:  acetaminophen, dextrose 10%, dextrose 10%, glucagon (human recombinant), glucose, glucose, naloxone, OLANZapine, sodium chloride 0.9%    Review of patient's allergies indicates:   Allergen Reactions    Penicillins Hives and Shortness Of Breath       Assessment/Plan:     * Acute psychosis  ASSESSMENT     Carmina Arcos is a 89 y.o. female with a past psychiatric history of SIPD, who presented to the [facility] due to manic behavior. Her symptoms of insomnia, hyperactivity, impulsivity, and delusions are likely 2/2 recent course of steroids. However, these symptoms are endangering her and have been difficult for her family to manage, especially due to the delay in seeing an outpatient provider. Pt's hospital course has been complicated by new somnolence and hypercapneic respiratory failure. Primary team initiated infectious workup.    IMPRESSION  Substance-induced psychotic disorder  R/o bipolar disorder    RECOMMENDATION(S)      1. Scheduled Medication(s):  - Continue Depakote 125mg PO BID  - Recommend scheduled Risperdal m-tabs (disintegrating) 0.5 mg PO QHS for acute sujata  - Can give Zyprexa 1.25 mg IM if refusing  PO Risperdal  - Agree with starting Melatonin 9 mg PO QHS  - Primary team discussed starting Trazodone - we feel that the risks of orthostatic hypotension outweigh the potential benefit, which is likely minimal in this acutely manic patient    2. PRN Medication(s):  - Continue Risperdal 0.5 mg PO q8hrs PRN non-redirectable agitation  - Continue PRN Zyprexa 2.5 mg PO or IM to 1.25 mg q8hrs PRN    3.  Monitor:  - Please obtain daily EKG to monitor QTc    4. Legal Status/Precaution(s):  - Patient does not meet criteria for PEC at this time. Patient does appear gravely disabled due to a psychiatric illness but is help-seeking and not contesting her admission. She may not have full capacity to refuse meds at this time given her altered mentation.  - Recommend fall precautions and strict bed rest with alarm on, low bed height, to avoid falls due to potential orthostatic hypotension secondary to Risperdal in this elderly patient.             Total time:  25 with greater than 50% of this time spent in counseling and/or coordination of care.       Tae Graff MD   Psychiatry PGY-2  8/6/22

## 2022-08-07 NOTE — PROGRESS NOTES
"[facility]  Progress Note    Patient Name: Carmina Arcos  MRN: [id number]  Patient Class: IP- Inpatient   Admission Date: 7/24/2022  Length of Stay: 6 days  Attending Physician: Dc Muñoz MD  Primary Care Provider: Dick Graham MD        Subjective:     Principal Problem:Acute psychosis        HPI:  History provided mostly by daughter Nimco. Ms Grewal is a 89 yo that  according to daughter after having a 3-4 weeks ago COPD exacerbation, was started on Prednisone by her son who is a nurse. States  that after a few days she started noticing behavioral changes described as flight of ideas, insomnia, rapid speech, alternating irritability with excitability, grandiose delusions auditory hallucinations, fixation with allegories, numerology and Rastafari.  Daughter also affirms, reckless spending. She was taken to [facility] but there was no psych service available. They decided to come to [facility] for psychiatric evaluation.  States previous episode precipitated by "diet pills " in 1999 and had to be committed to [facility] for a week. There she was started on risperidone, its unclear for how long she was on it. Daughter states that the patient also has a history of depression with ne suicide attempt.    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 She currently was living on her own and managed rental property, her family lives close to her home and visit regularly.    PMH: Breast Cancer? Colon Cancer. HTN treated with losartan hydrochlorothiazide and amlodipine, Hypothyroidism treated with levothyroxine, COPD treated with with formoterol-glycopyrrolate , albuterol PRN and Sildenafil given by his son.    Surgical hx positive for bilateral knee replacement, tonsillectomy, hysterectomy, right lumpectomy and colon surgery .    Allergies: Penicillin      Overview/Hospital Course:  Ms. Arcos was admitted on 7/24 for " acute manic symptoms. She was started on a regimen of Seroquel QHS but then switched to Risperidone and depakote per psych recommendations. On 7/31, she developed increasing dyspnea and ABGs indicated a respiratory acidosis with CO2 retention. Azithromycin was prescribed for anti-inflammation in the setting of COPD. Patient is awaiting placement.       Interval History:     No acute events overnight, afebrile and stable vitals. Pending SNF disposition. Today a wound was found in her inner right buttock and wound care has been contacted.    Review of Systems   Reason unable to perform ROS: Patient asleep during both encounters, no family at bedside.   Objective:     Vital Signs (Most Recent):  Temp: 98.4 °F (36.9 °C) (08/07/22 1144)  Pulse: 87 (08/07/22 1438)  Resp: 20 (08/07/22 1438)  BP: (!) 107/53 (08/07/22 1144)  SpO2: 98 % (08/07/22 1438)   Vital Signs (24h Range):  Temp:  [97.4 °F (36.3 °C)-98.4 °F (36.9 °C)] 98.4 °F (36.9 °C)  Pulse:  [82-90] 87  Resp:  [16-20] 20  SpO2:  [91 %-99 %] 98 %  BP: (107-139)/(53-65) 107/53     Weight: 80.3 kg (177 lb)  Body mass index is 31.35 kg/m².    Intake/Output Summary (Last 24 hours) at 8/7/2022 1458  Last data filed at 8/7/2022 0500  Gross per 24 hour   Intake --   Output 400 ml   Net -400 ml      Physical Exam  Constitutional:       General: She is not in acute distress.     Comments: In bed sleeping and somnolent when called   HENT:      Head: Normocephalic and atraumatic.      Nose: No congestion or rhinorrhea.      Mouth/Throat:      Mouth: Mucous membranes are moist.      Dentition: Does not have dentures.      Pharynx: Oropharynx is clear.   Cardiovascular:      Rate and Rhythm: Normal rate.      Pulses: Normal pulses.      Heart sounds: No murmur heard.    No friction rub. No gallop.   Pulmonary:      Effort: Pulmonary effort is normal.      Breath sounds: No wheezing or rhonchi.      Comments: Unable to take deep breaths  Abdominal:      General: There is no  distension.      Palpations: There is no mass.   Musculoskeletal:         General: No swelling or tenderness.   Lymphadenopathy:      Cervical: No cervical adenopathy.   Skin:     General: Skin is warm.   Psychiatric:      Comments: Patient seems less anxious than previous days, normal respiratory rate. Mood is labile.       Significant Labs: All pertinent labs within the past 24 hours have been reviewed.  Recent Lab Results         08/07/22  0436        Albumin 2.5        2.5       Alkaline Phosphatase 69       ALT 10       Anion Gap 4        4       AST 13       Baso # 0.06       Basophil % 1.0       BILIRUBIN TOTAL 0.4  Comment: For infants and newborns, interpretation of results should be based  on gestational age, weight and in agreement with clinical  observations.    Premature Infant recommended reference ranges:  Up to 24 hours.............<8.0 mg/dL  Up to 48 hours............<12.0 mg/dL  3-5 days..................<15.0 mg/dL  6-29 days.................<15.0 mg/dL         BUN 28        28       Calcium 9.7        9.7       Chloride 98        98       CO2 40        40       Creatinine 0.7        0.7       Differential Method Automated       eGFR >60.0        >60.0       Eos # 0.3       Eosinophil % 4.8       Glucose 157        157       Gran # (ANC) 4.3       Gran % 70.4       Hematocrit 37.0       Hemoglobin 11.1       Immature Grans (Abs) 0.04  Comment: Mild elevation in immature granulocytes is non specific and   can be seen in a variety of conditions including stress response,   acute inflammation, trauma and pregnancy. Correlation with other   laboratory and clinical findings is essential.         Immature Granulocytes 0.7       Lymph # 0.8       Lymph % 12.8       Magnesium 2.4       MCH 28.2       MCHC 30.0       MCV 94       Mono # 0.6       Mono % 10.3       MPV 10.9       nRBC 0       Phosphorus 2.8        2.8       Platelets 181       Potassium 4.8        4.8       PROTEIN TOTAL 5.9       RBC  3.93       RDW 13.3       Sodium 142        142       WBC 6.09               Significant Imaging: I have reviewed all pertinent imaging results/findings within the past 24 hours.      Assessment/Plan:      * Acute psychosis  Ms. Arcos is a 89 yo that  according to daughter after having a 3-4 weeks ago COPD exacerbation, was started on Prednisone by her son who is a nurse. States  that after a few days she started noticing behavioral changes described as flight of ideas, insomnia, rapid speech, alternating irritability with excitability, grandiose delusions auditory hallucinations, fixation with allegories, numerology and Orthodoxy.     Differential includes but not limited to drug induced delirium like from recent steroids use vs manic episode from mood disorder. Unlikely to be 2/2 to infection given normal WBC and lacks of infectious symptoms. UA is also negative. UDS is also negative. TSH is normal, low likely moran for thyroid condition     - Continue to follow Psych recs   - Continue to monitor mood and mental status   - Delirium precautions   - Holding further steroids     Scheduled Medication(s):  - Continue Depakote 125mg PO BID  - Continue scheduled Risperdal m-tabs (disintegrating) 0.5 mg PO QHS for acute sujata           Acute hypoxemic respiratory failure  Patient with Hypoxic Respiratory failure which is Acute on chronic.  she is not on home oxygen. Supplemental oxygen was provided and noted-  .       Impaired mobility and ADLs  PT/OT recommending SNF  Awaiting placement    COPD exacerbation  Currently with normal respiratory effort  -Avoid Steroid nebulizer.  -patient has refused nebulization, agrees to do them today  -Duonebs during the day to not disturb her sleep      Mood disorder due to a general medical condition  See acute psychosis    Sensorineural hearing loss (SNHL) of both ears        Dysphagia    Soft mechanical food in diet    Hypertension  HCTZ 25 mg QD, Losartan 100 mg QD           VTE  Risk Mitigation (From admission, onward)         Ordered     enoxaparin injection 40 mg  Daily         07/24/22 1332     IP VTE HIGH RISK PATIENT  Once         07/24/22 1332     Place sequential compression device  Until discontinued         07/24/22 1332                Discharge Planning   ANA: 8/9/2022     Code Status: Full Code   Is the patient medically ready for discharge?: No    Reason for patient still in hospital (select all that apply): Pending disposition  Discharge Plan A: Home Health   Discharge Delays: None known at this time              Gadiel Grajeda MD  Department of Hospital Medicine   [facility]

## 2022-08-07 NOTE — SUBJECTIVE & OBJECTIVE
Interval History:     No acute events overnight, afebrile and stable vitals. Pending SNF disposition. Today a wound was found in her inner right buttock and wound care has been contacted.    Review of Systems   Reason unable to perform ROS: Patient asleep during both encounters, no family at bedside.   Objective:     Vital Signs (Most Recent):  Temp: 98.4 °F (36.9 °C) (08/07/22 1144)  Pulse: 87 (08/07/22 1438)  Resp: 20 (08/07/22 1438)  BP: (!) 107/53 (08/07/22 1144)  SpO2: 98 % (08/07/22 1438)   Vital Signs (24h Range):  Temp:  [97.4 °F (36.3 °C)-98.4 °F (36.9 °C)] 98.4 °F (36.9 °C)  Pulse:  [82-90] 87  Resp:  [16-20] 20  SpO2:  [91 %-99 %] 98 %  BP: (107-139)/(53-65) 107/53     Weight: 80.3 kg (177 lb)  Body mass index is 31.35 kg/m².    Intake/Output Summary (Last 24 hours) at 8/7/2022 1458  Last data filed at 8/7/2022 0500  Gross per 24 hour   Intake --   Output 400 ml   Net -400 ml      Physical Exam  Constitutional:       General: She is not in acute distress.     Comments: In bed sleeping and somnolent when called   HENT:      Head: Normocephalic and atraumatic.      Nose: No congestion or rhinorrhea.      Mouth/Throat:      Mouth: Mucous membranes are moist.      Dentition: Does not have dentures.      Pharynx: Oropharynx is clear.   Cardiovascular:      Rate and Rhythm: Normal rate.      Pulses: Normal pulses.      Heart sounds: No murmur heard.    No friction rub. No gallop.   Pulmonary:      Effort: Pulmonary effort is normal.      Breath sounds: No wheezing or rhonchi.      Comments: Unable to take deep breaths  Abdominal:      General: There is no distension.      Palpations: There is no mass.   Musculoskeletal:         General: No swelling or tenderness.   Lymphadenopathy:      Cervical: No cervical adenopathy.   Skin:     General: Skin is warm.   Psychiatric:      Comments: Patient seems less anxious than previous days, normal respiratory rate. Mood is labile.       Significant Labs: All pertinent  labs within the past 24 hours have been reviewed.  Recent Lab Results         08/07/22  0436        Albumin 2.5        2.5       Alkaline Phosphatase 69       ALT 10       Anion Gap 4        4       AST 13       Baso # 0.06       Basophil % 1.0       BILIRUBIN TOTAL 0.4  Comment: For infants and newborns, interpretation of results should be based  on gestational age, weight and in agreement with clinical  observations.    Premature Infant recommended reference ranges:  Up to 24 hours.............<8.0 mg/dL  Up to 48 hours............<12.0 mg/dL  3-5 days..................<15.0 mg/dL  6-29 days.................<15.0 mg/dL         BUN 28        28       Calcium 9.7        9.7       Chloride 98        98       CO2 40        40       Creatinine 0.7        0.7       Differential Method Automated       eGFR >60.0        >60.0       Eos # 0.3       Eosinophil % 4.8       Glucose 157        157       Gran # (ANC) 4.3       Gran % 70.4       Hematocrit 37.0       Hemoglobin 11.1       Immature Grans (Abs) 0.04  Comment: Mild elevation in immature granulocytes is non specific and   can be seen in a variety of conditions including stress response,   acute inflammation, trauma and pregnancy. Correlation with other   laboratory and clinical findings is essential.         Immature Granulocytes 0.7       Lymph # 0.8       Lymph % 12.8       Magnesium 2.4       MCH 28.2       MCHC 30.0       MCV 94       Mono # 0.6       Mono % 10.3       MPV 10.9       nRBC 0       Phosphorus 2.8        2.8       Platelets 181       Potassium 4.8        4.8       PROTEIN TOTAL 5.9       RBC 3.93       RDW 13.3       Sodium 142        142       WBC 6.09               Significant Imaging: I have reviewed all pertinent imaging results/findings within the past 24 hours.

## 2022-08-07 NOTE — NURSING
Upon bathing patient a pocket filled wound was noticed on the patient inner right buttock. Wound care consulted. Place protective sterile borderplex to reduce friction and pressure off the wound. Pt report no pain or irritation at site.

## 2022-08-08 PROCEDURE — 25000003 PHARM REV CODE 250: Performed by: STUDENT IN AN ORGANIZED HEALTH CARE EDUCATION/TRAINING PROGRAM

## 2022-08-08 PROCEDURE — 94799 UNLISTED PULMONARY SVC/PX: CPT

## 2022-08-08 PROCEDURE — 27000221 HC OXYGEN, UP TO 24 HOURS

## 2022-08-08 PROCEDURE — 92526 ORAL FUNCTION THERAPY: CPT

## 2022-08-08 PROCEDURE — 94761 N-INVAS EAR/PLS OXIMETRY MLT: CPT

## 2022-08-08 PROCEDURE — 25000003 PHARM REV CODE 250: Performed by: PSYCHIATRY & NEUROLOGY

## 2022-08-08 PROCEDURE — 63600175 PHARM REV CODE 636 W HCPCS: Performed by: STUDENT IN AN ORGANIZED HEALTH CARE EDUCATION/TRAINING PROGRAM

## 2022-08-08 PROCEDURE — 99900035 HC TECH TIME PER 15 MIN (STAT)

## 2022-08-08 PROCEDURE — 25000242 PHARM REV CODE 250 ALT 637 W/ HCPCS: Performed by: HOSPITALIST

## 2022-08-08 PROCEDURE — 25000003 PHARM REV CODE 250: Performed by: HOSPITALIST

## 2022-08-08 PROCEDURE — 99232 SBSQ HOSP IP/OBS MODERATE 35: CPT | Mod: GC,,, | Performed by: HOSPITALIST

## 2022-08-08 PROCEDURE — 94660 CPAP INITIATION&MGMT: CPT

## 2022-08-08 PROCEDURE — 11000001 HC ACUTE MED/SURG PRIVATE ROOM

## 2022-08-08 PROCEDURE — 25000003 PHARM REV CODE 250

## 2022-08-08 PROCEDURE — 94640 AIRWAY INHALATION TREATMENT: CPT

## 2022-08-08 PROCEDURE — 99232 PR SUBSEQUENT HOSPITAL CARE,LEVL II: ICD-10-PCS | Mod: GC,,, | Performed by: HOSPITALIST

## 2022-08-08 RX ORDER — ALBUTEROL SULFATE 90 UG/1
2 AEROSOL, METERED RESPIRATORY (INHALATION) EVERY 6 HOURS PRN
Status: DISCONTINUED | OUTPATIENT
Start: 2022-08-08 | End: 2022-08-09 | Stop reason: HOSPADM

## 2022-08-08 RX ORDER — POLYETHYLENE GLYCOL 3350 17 G/17G
17 POWDER, FOR SOLUTION ORAL DAILY
Status: DISCONTINUED | OUTPATIENT
Start: 2022-08-08 | End: 2022-08-09 | Stop reason: HOSPADM

## 2022-08-08 RX ORDER — OLANZAPINE 2.5 MG/1
2.5 TABLET ORAL
Status: DISCONTINUED | OUTPATIENT
Start: 2022-08-08 | End: 2022-08-09 | Stop reason: HOSPADM

## 2022-08-08 RX ORDER — BALSAM PERU/CASTOR OIL
OINTMENT (GRAM) TOPICAL 2 TIMES DAILY
Status: DISCONTINUED | OUTPATIENT
Start: 2022-08-08 | End: 2022-08-09 | Stop reason: HOSPADM

## 2022-08-08 RX ORDER — DIVALPROEX SODIUM 125 MG/1
125 CAPSULE, COATED PELLETS ORAL NIGHTLY PRN
Status: DISCONTINUED | OUTPATIENT
Start: 2022-08-08 | End: 2022-08-09 | Stop reason: HOSPADM

## 2022-08-08 RX ORDER — DIVALPROEX SODIUM 125 MG/1
125 CAPSULE, COATED PELLETS ORAL NIGHTLY
Qty: 60 CAPSULE | Refills: 0
Start: 2022-08-08 | End: 2022-08-09 | Stop reason: HOSPADM

## 2022-08-08 RX ADMIN — IPRATROPIUM BROMIDE AND ALBUTEROL SULFATE 3 ML: 2.5; .5 SOLUTION RESPIRATORY (INHALATION) at 10:08

## 2022-08-08 RX ADMIN — LEVOTHYROXINE SODIUM 50 MCG: 50 TABLET ORAL at 05:08

## 2022-08-08 RX ADMIN — POLYETHYLENE GLYCOL 3350 17 G: 17 POWDER, FOR SOLUTION ORAL at 08:08

## 2022-08-08 RX ADMIN — RISPERIDONE 0.5 MG: 0.5 TABLET, ORALLY DISINTEGRATING ORAL at 08:08

## 2022-08-08 RX ADMIN — HYPROMELLOSE 2910 1 DROP: 5 SOLUTION OPHTHALMIC at 08:08

## 2022-08-08 RX ADMIN — IPRATROPIUM BROMIDE AND ALBUTEROL SULFATE 3 ML: 2.5; .5 SOLUTION RESPIRATORY (INHALATION) at 02:08

## 2022-08-08 RX ADMIN — DIVALPROEX SODIUM 125 MG: 125 CAPSULE, COATED PELLETS ORAL at 11:08

## 2022-08-08 RX ADMIN — HYPROMELLOSE 2910 1 DROP: 5 SOLUTION OPHTHALMIC at 05:08

## 2022-08-08 RX ADMIN — MICONAZOLE NITRATE: 20 CREAM TOPICAL at 08:08

## 2022-08-08 RX ADMIN — ACETAMINOPHEN 650 MG: 325 TABLET ORAL at 08:08

## 2022-08-08 RX ADMIN — Medication: at 09:08

## 2022-08-08 RX ADMIN — ENOXAPARIN SODIUM 40 MG: 40 INJECTION SUBCUTANEOUS at 05:08

## 2022-08-08 RX ADMIN — LOSARTAN POTASSIUM 100 MG: 50 TABLET, FILM COATED ORAL at 08:08

## 2022-08-08 RX ADMIN — TIOTROPIUM BROMIDE INHALATION SPRAY 2 PUFF: 3.12 SPRAY, METERED RESPIRATORY (INHALATION) at 08:08

## 2022-08-08 RX ADMIN — ACETAMINOPHEN 650 MG: 325 TABLET ORAL at 02:08

## 2022-08-08 RX ADMIN — SALMETEROL XINAFOATE 1 PUFF: 50 POWDER, METERED ORAL; RESPIRATORY (INHALATION) at 08:08

## 2022-08-08 NOTE — PT/OT/SLP PROGRESS
"Speech Language Pathology Treatment  Discharge    Patient Name:  Carmina Arcos   MRN:  [id number]   [id number] A    Admitting Diagnosis: Acute psychosis    Recommendations:                 General Recommendations:  Follow-up not indicated  Diet recommendations:  Mechanical soft, Liquid Diet Level: Thin   Aspiration Precautions: Standard aspiration precautions   General Precautions: Standard, aspiration, fall  Communication strategies:  none    Subjective     Patient awake with daughter present in room.   Daughter states, "She ate a jelly donut great this morning."  Patient goals: to discharge from hospital     Pain/Comfort:  · Pain Rating 1: 0/10    Respiratory Status: nasal cannula    Objective:     Has the patient been evaluated by SLP for swallowing?   Yes  Keep patient NPO? No     Patient seen to assess tolerance of diet. She accepted bites of softened brinda cracker and sips of water via straw. She presents with timely oral clearance and no overt s/s of aspiration. SLP provided education on SLP recommendations, SLP role, s/s and risks of aspiration, safe swallow precautions, and POC. Patient and family member verbalized understanding of all discussed.       Assessment:     Carmina Arcos is a 89 y.o. female with adequate tolerance of baseline mechanical soft diet and thin liquids. No further skilled acute Speech Therapy services warranted at this time. Please re-consult as needed.     Goals:   Multidisciplinary Problems     SLP Goals     Not on file          Multidisciplinary Problems (Resolved)        Problem: SLP    Goal Priority Disciplines Outcome   SLP Goal   (Resolved)     SLP Met   Description: Goals expected to be met by 8/6:  1. Pt will tolerate least restrictive diet without overt s/sx airway threat.                          Plan:     · Plan of Care reviewed with:  patient, daughter   · SLP Follow-Up:  No       Discharge recommendations:  nursing facility, skilled   Barriers to Discharge:  None per ST " discipline    Time Tracking:     SLP Treatment Date:   08/08/22  Speech Start Time:  0942  Speech Stop Time:  0950     Speech Total Time (min):  8 min    Billable Minutes: Treatment Swallowing Dysfunction 8    08/08/2022

## 2022-08-08 NOTE — CONSULTS
Inpatient consult to Physical Medicine Rehab  Consult performed by: Anna Chaidez NP  Consult ordered by: Gadiel Grajeda MD      Consult received.  Reviewed patient history and current admission.  PM&R following. Had reviewed pt and recommended SNF.     Anna Chaidez NP  Physical Medicine & Rehabilitation   08/08/2022

## 2022-08-08 NOTE — PLAN OF CARE
Problem: Adult Inpatient Plan of Care  Goal: Plan of Care Review  Outcome: Ongoing, Progressing  Goal: Patient-Specific Goal (Individualized)  Outcome: Ongoing, Progressing  Goal: Optimal Comfort and Wellbeing  Outcome: Ongoing, Progressing     Problem: Fall Injury Risk  Goal: Absence of Fall and Fall-Related Injury  Outcome: Ongoing, Progressing     Problem: Skin Injury Risk Increased  Goal: Skin Health and Integrity  Outcome: Ongoing, Progressing     No changes occurred during shift. Tele sitter at bedside. Safety precautions in place. Bed alarm set. Will continue to monitor.

## 2022-08-08 NOTE — PROGRESS NOTES
"[facility] Surg  Psychiatry  Progress Note    Patient Name: Carmina Arcos  MRN: [id number]   Code Status: Full Code  Admission Date: 7/24/2022  Hospital Length of Stay: 7 days  Expected Discharge Date: 8/9/2022  Attending Physician: Dc Muñoz MD  Primary Care Provider: Dick Graham MD    Current Legal Status: Uncontested    Patient information was obtained from patient, relative(s) and ER records.       Subjective:     Patient is a 89 y.o., female, presents with:    Principal Problem:Acute psychosis    Chief Complaint: AMS    HPI: Carmina Arcos is a 89 y.o. female with a past psychiatric history of substance-induced psychotic disorder who presented to [facility] due to Manic behavior. Psychiatry was consulted for "sujata x several days, multiple ED visits".    Per Primary Team:  89 yo W with pmhx COPD, anxiety, depression, HTN, breast CA presents with family for psych evaluation.  History is assisted by the patient and the patient's daughter.  They note that patient was having coughing flares of COPD 3 weeks ago and she was started on course of prednisone by a family member.  She was on the prednisone for almost 2 weeks.  The prednisone has since discontinued that she continues to have manic symptoms including insomnia, reckless spending behavior, typical references Septra.  She was seen in the emergency department several days ago and discharged home with close observation by family members and outpatient psychiatry follow-up.  That follow-up is in 6 days.  Family states that she will need a sooner psychiatric evaluation.  She did have history of manic episode many years ago in the setting of taking a diet pill.  She was admitted inpatient time.  The patient does report some foul-smelling odor in her urine and is concerned that she has a kidney infection.  Otherwise she is essentially without complaints.     The history is provided by the patient and a relative. No  was used.     Per " "Psychiatry:  Patient was seen in the ED with daughter at bedside; pt and daughter provided history together. Pt was alert, oriented x4, rapid pressured interruptive speech, euthymic affect. They report about a week of psychiatric symptoms including excitability/irritability, anxiety, insomnia, hyperactivity, visual hallucinations, hyperreligious and grandiose delusions. Pt has been preoccupied with numerology and believes her family is part of a biblical story and that she has been in the [facility] with Lázaro. She has also been impulsively shopping and spending large amounts of money. She has intermittent confusion, FOI, BALA on exam.    Pt has a history of COPD and started a tapered 2-wk course of PO Prednisone for a presumed exacerbation, which she got from her son (a nurse and RT). Before the course was completed, the pt's psychiatric symptoms started as described above. Family was concerned that the Prednisone may be causing her symptoms but they could not convince her to stop taking it. They presented to the ED several times since then and were initially comfortable with outpatient follow-up with pt's former psychiatrist, Dr. Ceballos, on Friday 7/29. However, they have become increasingly concerned with pt's symptoms and feel she may be a danger to herself.     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 She speaks several languages and has no known history of cognitive decline. Her psychiatric history includes one substance-induced psychotic episode in 1999. She took some stimulant diet pills, developed insomnia and visual hallucinations, and was admitted to [place]. She was started on Risperdal and Zoloft, which she continued to see Dr. Ceballos for for some time. Eventually her symptoms resolved and she went back to her "relatively high-strung" baseline, per daughter. She has been out of Dr. Ceballos's care and not having psychiatric " "issues for the past 10+ years. Pt's daughter also reports a remote suicide attempt over 30 yrs ago, via Benadryl overdose.    Prior to the course of Prednisone, pt was at baseline with none of her current symptoms. She last took Prednisone on Wednesday 7/20 but the symptoms have persisted. Pt and family are amenable to inpt hospitalization for her current symptoms.    Psychiatric Review of Systems-is patient experiencing or having changes in  sleep: yes, insomnia  appetite: no  weight: no  energy/anergy: yes, hyperactive  interest/pleasure/anhedonia: no  somatic symptoms: no  libido: no  anxiety/panic: yes  guilty/hopelessness: no  concentration: yes  S.I.B.s/risky behavior: yes, impulsive shopping  any drugs: no  alcohol: no     Allergies:  Penicillins      Hospital Course:   Initiated Seroquel 50 mg PO QHS overnight on 7/25, but had poor sleep, frequently getting up from bed and becoming agitated regarding delusional thought content (per daughter and nursing). Labile affect with hyperreligious thoughts. Disoriented to time, place. Seroquel was siwtched to risperdal 0.5mg qhs on 7/26, continued to be hyperreligious but AOx4. Intermittently agitated overnight on 7/27, with incident during which she fell at 3AM as she frequently got out of bed without assistance. Became physically threatening with staff in the AM. Continued to have minimal sleep.     7/28/22:  Overnight, pt had another fall/weakness episode requiring assistance to return to bed from bathroom. She received scheduled Risperdal 1 mg PO scheduled and 0.5 mg PRN @ 0216 for agitation. Pt reportedly did not respond to the PRN medication and continued to be elevated, irritable, and not sleeping. This morning, pt was sleeping but rousable for interview. She continued to be irritable and hyperreligious, referring to her "visions" and sleeping when God wants her to. She was oriented x3 and denied complaints other than wanting to sleep and being tired of getting " "interrupted. Interview terminated due to pt's irritability.    7/29/22:  Patient was agitated yesterday, frequently screaming, crying, and calling family members to discuss Advent delusions. She received PRN IM Zyprexa 2.5 mg yesterday afternoon and slept for several hours afterward. She received her first dose of QHS VPA yesterday and slept well throughout the night, per nursing. Pt was sleeping and difficult to arouse this morning but participated in brief psychiatric interview. She was oriented to time/place and endorsed good sleep overnight. Recalls recent events (daughter bringing baked goods to bedside). She reported she was still having her "visions" but said they were "allegory", not real. Interview terminated due to pt's drowsiness. Per nursing, pt was conversant/oriented this morning with son-in-law at bedside, both remarked how much calmer she was today than previous days.    7/31/22: Patient resting in bed with family at bedside, minimally interactive with eyes closed throughout most of interaction. Described the year as "2023" and when informed it was 2022, denied and stated that it was 2023. Perseverated on saying "they weren't telling the truth," and when asked who "they" were, she continuously stated "everyone." Reportedly not given risperdal and depakote d/t confusion and inability to swallow.    8/1/22:  Patient was frequently somnolent and intermittently unresponsive over the weekend, suspected to be 2/2 CO2 narcosis (confirmed via ABG yesterday, improved with Bipap). Per chart, she did not receive PO psych meds over the weekend due to difficulty swallowing and poor mentation. Her PO Depakote was switched to IV Depakon 125 BID. She was seen sleeping, upright in bed this morning, difficult to rouse and to keep alert/attentive. Frequently falling asleep and mumbling incoherently but also answering appropriately at times; oriented x4. Her family at bedside is concerned about her breathing as well " "as her ongoing irritability and refusal to cooperate with care at times.    8/2/22:  Slept poorly overnight per daughter in room and per nursing notes. Patient reports she slept fine. Not wearing O2 at this time. Says that if she wears it she will die. Cannot explain why - instead tells me to "fast forward, you'll find out." Oriented to year, month, place, self. Hurrying me along - "Go ahead ask another question doctor" - "Can you hurry and ask your questions?" Daughter says she is rushing me because then she doesn't have to try and organize her thoughts.     8/3/22:  Slept a bit better overnight with risperdal. Received 0.5mg dose. Daughter in room states patient is looking better today. Walked to the bathroom with assistance today. States she wants to go home. Minimal participation in interview at this time. Trying to catch up on sleep. Does know that grass requires "water and sun" to grow, oriented with prompting. Mood is good.    8/4/22:  Slept 6-8 hours last night per son. Taking risperdal and depakote as prescribed. Agitation improved, but remains somewhat irritable and intermittently confused. Oriented to self place and year. Hurrying the conversation with her daughter. Encouraged her to put her nasal cannula in, which she briefly did and then said "I can't!" and removed it. Says her mood is good.     8/5/22:  Slept well overnight again. Taking risperdal and depakote as prescribed. Agitation improved, but remains somewhat irritable and intermittently confused. On interview this AM she had just had an attempted blood draw and was in some distress so did not participate meaningfully in interview.  Seen again with staff this afternoon. She is SAVEAHAART positive at this time. Oriented to self, place, month, year. Unable to say what a train and bike have in common.     8/6/22:  Chart reviewed. ARGELIA. Medication compliant and no behavioral PRNs required. Patient greeted at bedside, oriented to person, place, " "month, year, day of week, current events. Reports her mood is "more optimistic" today. Says she visited with both her grandson and great-grandson who came to see her today. She is calm, grossly linear in thought process, and pleasant. Endorses some mild paranoia at times. Otherwise denies SI/HI/AVH.      8/7/22:  Patient seen and chart reviewed. No acute events documented overnight. Compliant with scheduled medications, did not require any behavioral PRNs. Patient seen today with family at bedside. Family reports that patient appeared to be "100% lucid" earlier this morning, and believes the current medication regimen is working well. Patient allowed to continue resting at this time.    8/8/22:  Seen this AM appearing well. Daughter Pura at bedside. Patient is pleasant and linear. Daughter describes her as lucid, mentally back at her basline. Still physically weak, needing assistance with getting to the bathroom. She denies problems with the risperidone, though feels the depakote is making her weak and tired. Mood has been good. CAM-ICU negative today. Fully oriented. Able to abstract, long term memory in tact recalling grandchildren. Eating well, taking fluids PO. Slept well overnight. No SI/HI/AVH.      Family History    None       Tobacco Use    Smoking status: Current Every Day Smoker     Types: Cigarettes    Smokeless tobacco: Never Used    Tobacco comment: smoked for 30-35 years per daughter quit many years ago   Substance and Sexual Activity    Alcohol use: Yes     Comment: socially less than one a month    Drug use: No    Sexual activity: Not Currently     Psychotherapeutics (From admission, onward)                Start     Stop Route Frequency Ordered    08/02/22 2230  risperiDONE disintegrating tablet 0.5 mg         -- Oral Nightly 08/02/22 2117 07/29/22 1727  OLANZapine injection 1.3 mg         -- IM Every 8 hours PRN 07/29/22 1727            Objective:     Vital Signs (Most Recent):  Temp: " "98.8 °F (37.1 °C) (08/08/22 1142)  Pulse: 84 (08/08/22 1254)  Resp: 17 (08/08/22 1254)  BP: (!) 106/52 (08/08/22 1254)  SpO2: 96 % (08/08/22 1254)   Vital Signs (24h Range):  Temp:  [97.5 °F (36.4 °C)-98.8 °F (37.1 °C)] 98.8 °F (37.1 °C)  Pulse:  [74-92] 84  Resp:  [16-20] 17  SpO2:  [91 %-98 %] 96 %  BP: (106-132)/(52-61) 106/52     Height: 5' 3" (160 cm)  Weight: 80.3 kg (177 lb)  Body mass index is 31.35 kg/m².    No intake or output data in the 24 hours ending 08/08/22 1335    Mental Status Exam:  Appearance: age appropriate, well nourished, casually dressed, lying in bed  Behavior/Cooperation: normal, cooperative  Speech: normal rate, normal pitch, normal volume  Mood: good  Affect: full  Thought Process: Linear, organized  Thought Content: No longer volunteering delusions. No SI/HI/AVH.    Orientation: person, place, situation  Memory: Recent and remote intact  Attention Span/Concentration: Impaired  Insight: intact  Judgment: limited        Significant Labs: Last 24 Hours:   Recent Lab Results       None            Significant Imaging: I have reviewed all pertinent imaging results/findings within the past 24 hours.       Scheduled Medications:   albuterol-ipratropium  3 mL Nebulization Q6H WAKE    artificial tears  1 drop Both Eyes TID    divalproex  125 mg Oral QHS    enoxaparin  40 mg Subcutaneous Daily    levothyroxine  50 mcg Oral Before breakfast    losartan  100 mg Oral Daily    miconazole   Topical (Top) BID    polyethylene glycol  17 g Oral Daily    risperiDONE  0.5 mg Oral QHS    salmeteroL  1 puff Inhalation BID    tiotropium bromide  2 puff Inhalation Daily       PRN Medications:  acetaminophen, dextrose 10%, dextrose 10%, glucagon (human recombinant), glucose, glucose, naloxone, OLANZapine, sodium chloride 0.9%    Review of patient's allergies indicates:   Allergen Reactions    Penicillins Hives and Shortness Of Breath    Prednisone Hallucinations     Admitted for sujata "       Assessment/Plan:     * Acute psychosis  ASSESSMENT     Carmina Arcos is a 89 y.o. female with a past psychiatric history of SIPD, who [facility]. Her symptoms of insomnia, hyperactivity, impulsivity, and delusions are likely 2/2 recent course of steroids. However, these symptoms are endangering her and have been difficult for her family to manage, especially due to the delay in seeing an outpatient provider. Pt's hospital course has been complicated by new somnolence and hypercapneic respiratory failure. Primary team initiated infectious workup.    IMPRESSION  Substance-induced psychotic disorder  Encephalopathy / Delirium (resolving)  R/o bipolar disorder    RECOMMENDATION(S)      1. Scheduled Medication(s):  - Recommend changing Depakote to 125mg PO qHS PRN  - Continue scheduled Risperdal m-tabs (disintegrating) 0.5 mg PO QHS for acute sujata  - Agree with starting Melatonin 9 mg PO QHS  - Primary team discussed starting Trazodone - we feel that the risks of orthostatic hypotension outweigh the potential benefit, which is likely minimal in this acutely manic patient    2. PRN Medication(s):  - Continue Risperdal 0.5 mg PO q8hrs PRN non-redirectable agitation  - Continue PRN Zyprexa 2.5 mg PO or IM to 1.25 mg q8hrs PRN    3.  Monitor:  - Please obtain EKG to monitor QTc    4. Legal Status/Precaution(s):  - Patient does not meet criteria for PEC at this time. Patient does appear gravely disabled due to a psychiatric illness but is help-seeking and not contesting her admission. She may not have full capacity to refuse meds at this time given her altered mentation.  - Recommend fall precautions and strict bed rest with alarm on, low bed height, to avoid falls due to potential orthostatic hypotension secondary to Risperdal in this elderly patient.         Total time:  25 with greater than 50% of this time spent in counseling and/or coordination of care.       Krish Eden MD    Psychiatry PGY-4  [place] [facility] Surg

## 2022-08-08 NOTE — SUBJECTIVE & OBJECTIVE
Interval History:   Patient denies CPAP overnight. No other acute events. This morning patient was not paranoid, without labile, did not have flight of ideas, no pressured speech, per daughter she is back to baseline. Able to maintain a coherent linear pleasant conversation. States she feels week and would like physical therapy and rehab and to go home a soon as possible.    Review of Systems   Constitutional:  Negative for chills and fever.   HENT:  Positive for dental problem. Negative for rhinorrhea and trouble swallowing.    Eyes:  Positive for discharge. Negative for pain and visual disturbance.   Respiratory:  Positive for apnea and shortness of breath. Negative for choking and chest tightness.    Cardiovascular:  Negative for palpitations.   Gastrointestinal:  Negative for constipation and diarrhea.   Musculoskeletal:  Positive for back pain.   Skin: Negative.    Neurological:  Negative for weakness.   Psychiatric/Behavioral:  Negative for agitation, behavioral problems, confusion, decreased concentration, dysphoric mood and hallucinations. The patient is not nervous/anxious and is not hyperactive.    Objective:     Vital Signs (Most Recent):  Temp: 98.3 °F (36.8 °C) (08/08/22 1609)  Pulse: 84 (08/08/22 1609)  Resp: 16 (08/08/22 1609)  BP: (!) 125/58 (08/08/22 1609)  SpO2: (!) 93 % (08/08/22 1609)   Vital Signs (24h Range):  Temp:  [97.5 °F (36.4 °C)-98.8 °F (37.1 °C)] 98.3 °F (36.8 °C)  Pulse:  [74-84] 84  Resp:  [16-20] 16  SpO2:  [91 %-97 %] 93 %  BP: (106-125)/(52-58) 125/58     Weight: 80.3 kg (177 lb)  Body mass index is 31.35 kg/m².    Intake/Output Summary (Last 24 hours) at 8/8/2022 1849  Last data filed at 8/8/2022 1600  Gross per 24 hour   Intake --   Output 300 ml   Net -300 ml      Physical Exam  Constitutional:       General: She is not in acute distress.     Appearance: Normal appearance.   HENT:      Head: Normocephalic and atraumatic.      Mouth/Throat:      Mouth: Mucous membranes are moist.       Pharynx: Oropharynx is clear.   Eyes:      Extraocular Movements: Extraocular movements intact.      Conjunctiva/sclera: Conjunctivae normal.      Pupils: Pupils are equal, round, and reactive to light.   Cardiovascular:      Rate and Rhythm: Normal rate and regular rhythm.      Pulses: Normal pulses.      Heart sounds: Normal heart sounds.   Pulmonary:      Effort: No respiratory distress.      Breath sounds: No wheezing or rales.   Abdominal:      General: There is no distension.      Palpations: There is no mass.      Tenderness: There is no guarding.   Musculoskeletal:      Cervical back: Normal range of motion.      Right lower leg: No edema.      Left lower leg: No edema.   Skin:     Coloration: Skin is pale.   Neurological:      General: No focal deficit present.      Mental Status: She is alert and oriented to person, place, and time. Mental status is at baseline.   Psychiatric:         Mood and Affect: Mood normal.         Behavior: Behavior normal.         Thought Content: Thought content normal.         Judgment: Judgment normal.       Significant Labs: All pertinent labs within the past 24 hours have been reviewed.  Recent Lab Results       None            Significant Imaging: I have reviewed all pertinent imaging results/findings within the past 24 hours.

## 2022-08-08 NOTE — PLAN OF CARE
"MARIO followed up on SNF referrals:    [facility] denied "Care Needs Exceed Capacity"  [facility] - SW left a message for a call back  [facility] is reveiewing  [facility] - has no beds available until the 17th.  MARIO left a message for Bhaskar Talley    SW will follow.    11:51 AM  SW left another message for [facility] Roh[facility] update  MARIO left a message for [facility]  MARIO left a message for IshmaelCLARA MARTIN spoke with the Pt's daughter, Pura, who stated she is asking [facility] to evaluate the Pt again, as she has become "more coherent". Pura believes the Pt needs IPR. Eddie had previously evaluated and recommended SNF placement. Also, as the Pt has [employer] Medicare insurance, there's a good chance the Pt won't be approved for IPR. MARIO will follow for any changes.     1:20 PM  MARIO communicated with Yesenia at [facility], and relayed via family that the Pt is Covid vaccinated and boosted. [facility] is interested in placement.     3:57 PM  [facility] accepts the Pt. Pt will still be re-evaluated for IPR.     JOHNNIE Avelar, LMSW [facility]  [id number]      "

## 2022-08-08 NOTE — PROGRESS NOTES
"[facility] Surg  Psychiatry  Progress Note    Patient Name: Carmina Arcos  MRN: [id number]   Code Status: Full Code  Admission Date: 7/24/2022  Hospital Length of Stay: 6 days  Expected Discharge Date: 8/9/2022  Attending Physician: Dc Muñoz MD  Primary Care Provider: Dick Graham MD    Current Legal Status: Uncontested    Patient information was obtained from relative(s) and primary team.       Subjective:     Patient is a 89 y.o., female, presents with:    Principal Problem:Acute psychosis    Chief Complaint: psychosis     HPI: Carmina Arcos is a 89 y.o. female with a past psychiatric history of substance-induced psychotic disorder who presented to [facility] due to Manic behavior. Psychiatry was consulted for "sujata x several days, multiple ED visits".    Per Primary Team:  89 yo W with pmhx COPD, anxiety, depression, HTN, breast CA presents with family for psych evaluation.  History is assisted by the patient and the patient's daughter.  They note that patient was having coughing flares of COPD 3 weeks ago and she was started on course of prednisone by a family member.  She was on the prednisone for almost 2 weeks.  The prednisone has since discontinued that she continues to have manic symptoms including insomnia, reckless spending behavior, typical references Septra.  She was seen in the emergency department several days ago and discharged home with close observation by family members and outpatient psychiatry follow-up.  That follow-up is in 6 days.  Family states that she will need a sooner psychiatric evaluation.  She did have history of manic episode many years ago in the setting of taking a diet pill.  She was admitted inpatient time.  The patient does report some foul-smelling odor in her urine and is concerned that she has a kidney infection.  Otherwise she is essentially without complaints.     The history is provided by the patient and a relative. No  was used.     Per " "Psychiatry:  Patient was seen in the ED with daughter at bedside; pt and daughter provided history together. Pt was alert, oriented x4, rapid pressured interruptive speech, euthymic affect. They report about a week of psychiatric symptoms including excitability/irritability, anxiety, insomnia, hyperactivity, visual hallucinations, hyperreligious and grandiose delusions. Pt has been preoccupied with numerology and believes her family is part of a biblical story and that she has been in the [facility] with Lázaro. She has also been impulsively shopping and spending large amounts of money. She has intermittent confusion, FOI, BALA on exam.    Pt has a history of COPD and started a tapered 2-wk course of PO Prednisone for a presumed exacerbation, which she got from her son (a nurse and RT). Before the course was completed, the pt's psychiatric symptoms started as described above. Family was concerned that the Prednisone may be causing her symptoms but they could not convince her to stop taking it. They presented to the ED several times since then and were initially comfortable with outpatient follow-up with pt's former psychiatrist, Dr. Ceballos, on Friday 7/29. However, they have become increasingly concerned with pt's symptoms and feel she may be a danger to herself.     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 She speaks several languages and has no known history of cognitive decline. Her psychiatric history includes one substance-induced psychotic episode in 1999. She took some stimulant diet pills, developed insomnia and visual hallucinations, and was admitted to [facility]. She was started on Risperdal and Zoloft, which she continued to see Dr. Ceballos for for some time. Eventually her symptoms resolved and she went back to her "relatively high-strung" baseline, per daughter. She has been out of Dr. Ceballos's care and not having psychiatric " "issues for the past 10+ years. Pt's daughter also reports a remote suicide attempt over 30 yrs ago, via Benadryl overdose.    Prior to the course of Prednisone, pt was at baseline with none of her current symptoms. She last took Prednisone on Wednesday 7/20 but the symptoms have persisted. Pt and family are amenable to inpt hospitalization for her current symptoms.    Psychiatric Review of Systems-is patient experiencing or having changes in  sleep: yes, insomnia  appetite: no  weight: no  energy/anergy: yes, hyperactive  interest/pleasure/anhedonia: no  somatic symptoms: no  libido: no  anxiety/panic: yes  guilty/hopelessness: no  concentration: yes  S.I.B.s/risky behavior: yes, impulsive shopping  any drugs: no  alcohol: no     Allergies:  Penicillins      Hospital Course:   Initiated Seroquel 50 mg PO QHS overnight on 7/25, but had poor sleep, frequently getting up from bed and becoming agitated regarding delusional thought content (per daughter and nursing). Labile affect with hyperreligious thoughts. Disoriented to time, place. Seroquel was siwtched to risperdal 0.5mg qhs on 7/26, continued to be hyperreligious but AOx4. Intermittently agitated overnight on 7/27, with incident during which she fell at 3AM as she frequently got out of bed without assistance. Became physically threatening with staff in the AM. Continued to have minimal sleep.     7/28/22:  Overnight, pt had another fall/weakness episode requiring assistance to return to bed from bathroom. She received scheduled Risperdal 1 mg PO scheduled and 0.5 mg PRN @ 0216 for agitation. Pt reportedly did not respond to the PRN medication and continued to be elevated, irritable, and not sleeping. This morning, pt was sleeping but rousable for interview. She continued to be irritable and hyperreligious, referring to her "visions" and sleeping when God wants her to. She was oriented x3 and denied complaints other than wanting to sleep and being tired of getting " "interrupted. Interview terminated due to pt's irritability.    7/29/22:  Patient was agitated yesterday, frequently screaming, crying, and calling family members to discuss Hindu delusions. She received PRN IM Zyprexa 2.5 mg yesterday afternoon and slept for several hours afterward. She received her first dose of QHS VPA yesterday and slept well throughout the night, per nursing. Pt was sleeping and difficult to arouse this morning but participated in brief psychiatric interview. She was oriented to time/place and endorsed good sleep overnight. Recalls recent events (daughter bringing baked goods to bedside). She reported she was still having her "visions" but said they were "allegory", not real. Interview terminated due to pt's drowsiness. Per nursing, pt was conversant/oriented this morning with son-in-law at bedside, both remarked how much calmer she was today than previous days.    7/31/22: Patient resting in bed with family at bedside, minimally interactive with eyes closed throughout most of interaction. Described the year as "2023" and when informed it was 2022, denied and stated that it was 2023. Perseverated on saying "they weren't telling the truth," and when asked who "they" were, she continuously stated "everyone." Reportedly not given risperdal and depakote d/t confusion and inability to swallow.    8/1/22:  Patient was frequently somnolent and intermittently unresponsive over the weekend, suspected to be 2/2 CO2 narcosis (confirmed via ABG yesterday, improved with Bipap). Per chart, she did not receive PO psych meds over the weekend due to difficulty swallowing and poor mentation. Her PO Depakote was switched to IV Depakon 125 BID. She was seen sleeping, upright in bed this morning, difficult to rouse and to keep alert/attentive. Frequently falling asleep and mumbling incoherently but also answering appropriately at times; oriented x4. Her family at bedside is concerned about her breathing as well " "as her ongoing irritability and refusal to cooperate with care at times.    8/2/22:  Slept poorly overnight per daughter in room and per nursing notes. Patient reports she slept fine. Not wearing O2 at this time. Says that if she wears it she will die. Cannot explain why - instead tells me to "fast forward, you'll find out." Oriented to year, month, place, self. Hurrying me along - "Go ahead ask another question doctor" - "Can you hurry and ask your questions?" Daughter says she is rushing me because then she doesn't have to try and organize her thoughts.     8/6/22:  Chart reviewed. NAEON. Medication compliant and no behavioral PRNs required. Patient greeted at bedside, oriented to person, place, month, year, day of week, current events. Reports her mood is "more optimistic" today. Says she visited with both her grandson and great-grandson who came to see her today. She is calm, grossly linear in thought process, and pleasant. Endorses some mild paranoia at times. Otherwise denies SI/HI/AVH.     8/7/22:  Patient seen and chart reviewed. No acute events documented overnight. Compliant with scheduled medications, did not require any behavioral PRNs. Patient seen today with family at bedside. Family reports that patient appeared to be "100% lucid" earlier this morning, and believes the current medication regimen is working well. Patient allowed to continue resting at this time.         Family History    None       Tobacco Use    Smoking status: Current Every Day Smoker     Types: Cigarettes    Smokeless tobacco: Never Used    Tobacco comment: smoked for 30-35 years per daughter quit many years ago   Substance and Sexual Activity    Alcohol use: Yes     Comment: socially less than one a month    Drug use: No    Sexual activity: Not Currently     Psychotherapeutics (From admission, onward)                Start     Stop Route Frequency Ordered    08/02/22 2230  risperiDONE disintegrating tablet 0.5 mg         -- " "Oral Nightly 08/02/22 2117 07/29/22 1727  OLANZapine injection 1.3 mg         -- IM Every 8 hours PRN 07/29/22 1727             Review of Systems  Objective:     Vital Signs (Most Recent):  Temp: 98.8 °F (37.1 °C) (08/07/22 1600)  Pulse: 92 (08/07/22 1600)  Resp: 16 (08/07/22 1600)  BP: 132/61 (08/07/22 1600)  SpO2: 95 % (08/07/22 1600) Vital Signs (24h Range):  Temp:  [98 °F (36.7 °C)-98.8 °F (37.1 °C)] 98.8 °F (37.1 °C)  Pulse:  [82-92] 92  Resp:  [16-20] 16  SpO2:  [94 %-99 %] 95 %  BP: (107-139)/(53-65) 132/61     Height: 5' 3" (160 cm)  Weight: 80.3 kg (177 lb)  Body mass index is 31.35 kg/m².      Intake/Output Summary (Last 24 hours) at 8/7/2022 1958  Last data filed at 8/7/2022 0500  Gross per 24 hour   Intake --   Output 400 ml   Net -400 ml       Mental Status Exam:  Deferred, patient sleeping comfortably       Significant Labs: All pertinent labs within the past 24 hours have been reviewed.    Significant Imaging: I have reviewed all pertinent imaging results/findings within the past 24 hours.       Scheduled Medications:   albuterol-ipratropium  3 mL Nebulization Q6H WAKE    artificial tears  1 drop Both Eyes TID    divalproex  125 mg Oral QHS    enoxaparin  40 mg Subcutaneous Daily    levothyroxine  50 mcg Oral Before breakfast    losartan  100 mg Oral Daily    miconazole   Topical (Top) BID    risperiDONE  0.5 mg Oral QHS    salmeteroL  1 puff Inhalation BID    tiotropium bromide  2 puff Inhalation Daily       PRN Medications:  acetaminophen, dextrose 10%, dextrose 10%, glucagon (human recombinant), glucose, glucose, naloxone, OLANZapine, sodium chloride 0.9%    Review of patient's allergies indicates:   Allergen Reactions    Penicillins Hives and Shortness Of Breath    Prednisone Hallucinations     Admitted for sujata       Assessment/Plan:     * Acute psychosis  ASSESSMENT     Carmina Arcos is a 89 y.o. female with a past psychiatric history of SIPD, who presented to the [facility] due to " manic behavior. Her symptoms of insomnia, hyperactivity, impulsivity, and delusions are likely 2/2 recent course of steroids. However, these symptoms are endangering her and have been difficult for her family to manage, especially due to the delay in seeing an outpatient provider. Pt's hospital course has been complicated by new somnolence and hypercapneic respiratory failure. Primary team initiated infectious workup.    IMPRESSION  Substance-induced psychotic disorder  R/o bipolar disorder    RECOMMENDATION(S)      1. Scheduled Medication(s):  - Continue Depakote 125mg PO BID  - Recommend scheduled Risperdal m-tabs (disintegrating) 0.5 mg PO QHS for acute sujata  - Can give Zyprexa 1.25 mg IM if refusing PO Risperdal  - Agree with starting Melatonin 9 mg PO QHS  - Primary team discussed starting Trazodone - we feel that the risks of orthostatic hypotension outweigh the potential benefit, which is likely minimal in this acutely manic patient    2. PRN Medication(s):  - Continue Risperdal 0.5 mg PO q8hrs PRN non-redirectable agitation  - Continue PRN Zyprexa 2.5 mg PO or IM to 1.25 mg q8hrs PRN    3.  Monitor:  - Please obtain daily EKG to monitor QTc    4. Legal Status/Precaution(s):  - Patient does not meet criteria for PEC at this time. Patient does appear gravely disabled due to a psychiatric illness but is help-seeking and not contesting her admission. She may not have full capacity to refuse meds at this time given her altered mentation.  - Recommend fall precautions and strict bed rest with alarm on, low bed height, to avoid falls due to potential orthostatic hypotension secondary to Risperdal in this elderly patient.           Need for Continued Hospitalization:  No need for inpatient psychiatric hospitalization. Continue medical care as per the primary team.    Anticipated Disposition:  Per primary team    Total time:  15 with greater than 50% of this time spent in counseling and/or coordination of care.  "      Moy Hernandez" Jordan Davis MD  [facility] Psychiatry, PGY-II    "

## 2022-08-08 NOTE — PLAN OF CARE
Problem: SLP  Goal: SLP Goal  Description: Goals expected to be met by 8/6:  1. Pt will tolerate least restrictive diet without overt s/sx airway threat.   Outcome: Met   Patient tolerating baseline soft diet and thin liquids. No further skilled acute Speech Therapy services warranted at this time. Please re-consult as needed.

## 2022-08-08 NOTE — ASSESSMENT & PLAN NOTE
Currently with normal respiratory effort  -Avoid Steroid nebulizer.  -patient has refused nebulization, agrees to do them today  -Albuterol prn  -Wean oxygen   maintan sat between 88-92%

## 2022-08-08 NOTE — PROGRESS NOTES
08/08/22 1200   WOCN Assessment   WOCN Total Time (mins) 25   Visit Date 08/08/22   Visit Time 1200   Consult Type New   WOCN Speciality Wound   Intervention assessed;chart review;orders;coordination of care   Teaching on-going        Altered Skin Integrity 08/08/22 1200 Right medial Buttocks Blister(s)   Date First Assessed/Time First Assessed: 08/08/22 1200   Side: Right  Orientation: medial  Location: Buttocks  Is this injury device related?: No  Primary Wound Type: Blister(s)   Wound Image    Dressing Appearance Open to air   Drainage Amount None   Appearance Purple   Periwound Area Redness   Wound Length (cm) 3 cm   Wound Width (cm) 2.5 cm   Wound Depth (cm) 0 cm   Wound Volume (cm^3) 0 cm^3   Wound Surface Area (cm^2) 7.5 cm^2   Care Cleansed with:;Sterile normal saline        Altered Skin Integrity 08/08/22 1200 Left medial Buttocks Blister(s)   Date First Assessed/Time First Assessed: 08/08/22 1200   Side: Left  Orientation: medial  Location: Buttocks  Is this injury device related?: No  Primary Wound Type: Blister(s)   Wound Image    Dressing Appearance Open to air   Drainage Amount None   Appearance Purple;Blistered   Periwound Area Redness   Wound Length (cm) 6 cm   Wound Width (cm) 4 cm   Wound Depth (cm) 0 cm   Wound Volume (cm^3) 0 cm^3   Wound Surface Area (cm^2) 24 cm^2   Care Cleansed with:;Sterile normal saline   Wound consult for Bilateral inner buttocks and area between R great toe and 2nd toe.  Recommendations:  BPCO to moraima inner buttocks, waffle overlay, wedge for turns, turn every 2 hours, and Truvue boots for bilateral heels.  Area between toes have no open areas noted.  Pt states she is having a shooting pain on the top of 2nd toe. Redness and slight swelling noted.  Consider podiatry consult.  Pt has seen Dr. Ervin in the past.  Daughter at bedside and assisted with information given.  Skin integrity NP, ENRIQUE mercer.

## 2022-08-08 NOTE — SUBJECTIVE & OBJECTIVE
"  Family History    None       Tobacco Use    Smoking status: Current Every Day Smoker     Types: Cigarettes    Smokeless tobacco: Never Used    Tobacco comment: smoked for 30-35 years per daughter quit many years ago   Substance and Sexual Activity    Alcohol use: Yes     Comment: socially less than one a month    Drug use: No    Sexual activity: Not Currently     Psychotherapeutics (From admission, onward)                Start     Stop Route Frequency Ordered    08/02/22 2230  risperiDONE disintegrating tablet 0.5 mg         -- Oral Nightly 08/02/22 2117 07/29/22 1727  OLANZapine injection 1.3 mg         -- IM Every 8 hours PRN 07/29/22 1727            Objective:     Vital Signs (Most Recent):  Temp: 98.8 °F (37.1 °C) (08/08/22 1142)  Pulse: 84 (08/08/22 1254)  Resp: 17 (08/08/22 1254)  BP: (!) 106/52 (08/08/22 1254)  SpO2: 96 % (08/08/22 1254)   Vital Signs (24h Range):  Temp:  [97.5 °F (36.4 °C)-98.8 °F (37.1 °C)] 98.8 °F (37.1 °C)  Pulse:  [74-92] 84  Resp:  [16-20] 17  SpO2:  [91 %-98 %] 96 %  BP: (106-132)/(52-61) 106/52     Height: 5' 3" (160 cm)  Weight: 80.3 kg (177 lb)  Body mass index is 31.35 kg/m².    No intake or output data in the 24 hours ending 08/08/22 1335    Mental Status Exam:  Appearance: age appropriate, well nourished, casually dressed, lying in bed  Behavior/Cooperation: normal, cooperative  Speech: normal rate, normal pitch, normal volume  Mood: good  Affect: full  Thought Process: Linear, organized  Thought Content: No longer volunteering delusions. No SI/HI/AVH.    Orientation: person, place, situation  Memory: Recent and remote intact  Attention Span/Concentration: Impaired  Insight: intact  Judgment: limited        Significant Labs: Last 24 Hours:   Recent Lab Results       None            Significant Imaging: I have reviewed all pertinent imaging results/findings within the past 24 hours.  "

## 2022-08-08 NOTE — PLAN OF CARE
Patient had an uneventful day  Problem: Adult Inpatient Plan of Care  Goal: Plan of Care Review  Outcome: Ongoing, Progressing  Goal: Patient-Specific Goal (Individualized)  Outcome: Ongoing, Progressing  Goal: Absence of Hospital-Acquired Illness or Injury  Outcome: Ongoing, Progressing  Goal: Optimal Comfort and Wellbeing  Outcome: Ongoing, Progressing  Goal: Readiness for Transition of Care  Outcome: Ongoing, Progressing     Problem: Fall Injury Risk  Goal: Absence of Fall and Fall-Related Injury  Outcome: Ongoing, Progressing     Problem: Skin Injury Risk Increased  Goal: Skin Health and Integrity  Outcome: Ongoing, Progressing     Problem: Impaired Wound Healing  Goal: Optimal Wound Healing  Outcome: Ongoing, Progressing

## 2022-08-08 NOTE — SUBJECTIVE & OBJECTIVE
"  Family History    None       Tobacco Use    Smoking status: Current Every Day Smoker     Types: Cigarettes    Smokeless tobacco: Never Used    Tobacco comment: smoked for 30-35 years per daughter quit many years ago   Substance and Sexual Activity    Alcohol use: Yes     Comment: socially less than one a month    Drug use: No    Sexual activity: Not Currently     Psychotherapeutics (From admission, onward)                Start     Stop Route Frequency Ordered    08/02/22 2230  risperiDONE disintegrating tablet 0.5 mg         -- Oral Nightly 08/02/22 2117 07/29/22 1727  OLANZapine injection 1.3 mg         -- IM Every 8 hours PRN 07/29/22 1727             Review of Systems  Objective:     Vital Signs (Most Recent):  Temp: 98.8 °F (37.1 °C) (08/07/22 1600)  Pulse: 92 (08/07/22 1600)  Resp: 16 (08/07/22 1600)  BP: 132/61 (08/07/22 1600)  SpO2: 95 % (08/07/22 1600) Vital Signs (24h Range):  Temp:  [98 °F (36.7 °C)-98.8 °F (37.1 °C)] 98.8 °F (37.1 °C)  Pulse:  [82-92] 92  Resp:  [16-20] 16  SpO2:  [94 %-99 %] 95 %  BP: (107-139)/(53-65) 132/61     Height: 5' 3" (160 cm)  Weight: 80.3 kg (177 lb)  Body mass index is 31.35 kg/m².      Intake/Output Summary (Last 24 hours) at 8/7/2022 1958  Last data filed at 8/7/2022 0500  Gross per 24 hour   Intake --   Output 400 ml   Net -400 ml       Mental Status Exam:  Deferred, patient sleeping comfortably       Significant Labs: All pertinent labs within the past 24 hours have been reviewed.    Significant Imaging: I have reviewed all pertinent imaging results/findings within the past 24 hours.  "

## 2022-08-08 NOTE — ASSESSMENT & PLAN NOTE
ASSESSMENT     Carmina Arcos is a 89 y.o. female with a past psychiatric history of SIPD, who presented to the [facility] due to manic behavior. Her symptoms of insomnia, hyperactivity, impulsivity, and delusions are likely 2/2 recent course of steroids. However, these symptoms are endangering her and have been difficult for her family to manage, especially due to the delay in seeing an outpatient provider. Pt's hospital course has been complicated by new somnolence and hypercapneic respiratory failure. Primary team initiated infectious workup.    IMPRESSION  Substance-induced psychotic disorder  R/o bipolar disorder    RECOMMENDATION(S)      1. Scheduled Medication(s):  - Continue Depakote 125mg PO BID  - Recommend scheduled Risperdal m-tabs (disintegrating) 0.5 mg PO QHS for acute sujata  - Can give Zyprexa 1.25 mg IM if refusing PO Risperdal  - Agree with starting Melatonin 9 mg PO QHS  - Primary team discussed starting Trazodone - we feel that the risks of orthostatic hypotension outweigh the potential benefit, which is likely minimal in this acutely manic patient    2. PRN Medication(s):  - Continue Risperdal 0.5 mg PO q8hrs PRN non-redirectable agitation  - Continue PRN Zyprexa 2.5 mg PO or IM to 1.25 mg q8hrs PRN    3.  Monitor:  - Please obtain daily EKG to monitor QTc    4. Legal Status/Precaution(s):  - Patient does not meet criteria for PEC at this time. Patient does appear gravely disabled due to a psychiatric illness but is help-seeking and not contesting her admission. She may not have full capacity to refuse meds at this time given her altered mentation.  - Recommend fall precautions and strict bed rest with alarm on, low bed height, to avoid falls due to potential orthostatic hypotension secondary to Risperdal in this elderly patient.

## 2022-08-09 VITALS
TEMPERATURE: 98 F | BODY MASS INDEX: 31.36 KG/M2 | WEIGHT: 177 LBS | SYSTOLIC BLOOD PRESSURE: 133 MMHG | HEIGHT: 63 IN | RESPIRATION RATE: 16 BRPM | HEART RATE: 82 BPM | OXYGEN SATURATION: 90 % | DIASTOLIC BLOOD PRESSURE: 62 MMHG

## 2022-08-09 PROBLEM — T14.8XXA BLISTER: Status: ACTIVE | Noted: 2022-08-09

## 2022-08-09 LAB
ALBUMIN SERPL BCP-MCNC: 2.6 G/DL (ref 3.5–5.2)
ALP SERPL-CCNC: 65 U/L (ref 55–135)
ALT SERPL W/O P-5'-P-CCNC: 11 U/L (ref 10–44)
ANION GAP SERPL CALC-SCNC: 8 MMOL/L (ref 8–16)
AST SERPL-CCNC: 14 U/L (ref 10–40)
BASOPHILS # BLD AUTO: 0.04 K/UL (ref 0–0.2)
BASOPHILS NFR BLD: 0.8 % (ref 0–1.9)
BILIRUB SERPL-MCNC: 0.4 MG/DL (ref 0.1–1)
BUN SERPL-MCNC: 22 MG/DL (ref 10–30)
CALCIUM SERPL-MCNC: 9.3 MG/DL (ref 8.7–10.5)
CHLORIDE SERPL-SCNC: 99 MMOL/L (ref 95–110)
CO2 SERPL-SCNC: 34 MMOL/L (ref 23–29)
CREAT SERPL-MCNC: 0.7 MG/DL (ref 0.5–1.4)
DIFFERENTIAL METHOD: ABNORMAL
EOSINOPHIL # BLD AUTO: 0.3 K/UL (ref 0–0.5)
EOSINOPHIL NFR BLD: 5.2 % (ref 0–8)
ERYTHROCYTE [DISTWIDTH] IN BLOOD BY AUTOMATED COUNT: 13.6 % (ref 11.5–14.5)
EST. GFR  (NO RACE VARIABLE): >60 ML/MIN/1.73 M^2
GLUCOSE SERPL-MCNC: 143 MG/DL (ref 70–110)
HCT VFR BLD AUTO: 34.6 % (ref 37–48.5)
HGB BLD-MCNC: 10.8 G/DL (ref 12–16)
IMM GRANULOCYTES # BLD AUTO: 0.02 K/UL (ref 0–0.04)
IMM GRANULOCYTES NFR BLD AUTO: 0.4 % (ref 0–0.5)
LYMPHOCYTES # BLD AUTO: 0.9 K/UL (ref 1–4.8)
LYMPHOCYTES NFR BLD: 17.8 % (ref 18–48)
MAGNESIUM SERPL-MCNC: 2.1 MG/DL (ref 1.6–2.6)
MCH RBC QN AUTO: 28.8 PG (ref 27–31)
MCHC RBC AUTO-ENTMCNC: 31.2 G/DL (ref 32–36)
MCV RBC AUTO: 92 FL (ref 82–98)
MONOCYTES # BLD AUTO: 0.6 K/UL (ref 0.3–1)
MONOCYTES NFR BLD: 11 % (ref 4–15)
NEUTROPHILS # BLD AUTO: 3.2 K/UL (ref 1.8–7.7)
NEUTROPHILS NFR BLD: 64.8 % (ref 38–73)
NRBC BLD-RTO: 0 /100 WBC
PHOSPHATE SERPL-MCNC: 3.3 MG/DL (ref 2.7–4.5)
PLATELET # BLD AUTO: 173 K/UL (ref 150–450)
PMV BLD AUTO: 10.5 FL (ref 9.2–12.9)
POTASSIUM SERPL-SCNC: 4.7 MMOL/L (ref 3.5–5.1)
PROT SERPL-MCNC: 5.2 G/DL (ref 6–8.4)
RBC # BLD AUTO: 3.75 M/UL (ref 4–5.4)
SODIUM SERPL-SCNC: 141 MMOL/L (ref 136–145)
WBC # BLD AUTO: 4.99 K/UL (ref 3.9–12.7)

## 2022-08-09 PROCEDURE — 99900035 HC TECH TIME PER 15 MIN (STAT)

## 2022-08-09 PROCEDURE — 94761 N-INVAS EAR/PLS OXIMETRY MLT: CPT

## 2022-08-09 PROCEDURE — 85025 COMPLETE CBC W/AUTO DIFF WBC: CPT | Performed by: STUDENT IN AN ORGANIZED HEALTH CARE EDUCATION/TRAINING PROGRAM

## 2022-08-09 PROCEDURE — 99238 PR HOSPITAL DISCHARGE DAY,<30 MIN: ICD-10-PCS | Mod: GC,,, | Performed by: HOSPITALIST

## 2022-08-09 PROCEDURE — 25000003 PHARM REV CODE 250

## 2022-08-09 PROCEDURE — 99223 PR INITIAL HOSPITAL CARE,LEVL III: ICD-10-PCS | Mod: ,,, | Performed by: NURSE PRACTITIONER

## 2022-08-09 PROCEDURE — 99232 PR SUBSEQUENT HOSPITAL CARE,LEVL II: ICD-10-PCS | Mod: ,,, | Performed by: NURSE PRACTITIONER

## 2022-08-09 PROCEDURE — 1111F DSCHRG MED/CURRENT MED MERGE: CPT | Mod: CPTII,GC,, | Performed by: HOSPITALIST

## 2022-08-09 PROCEDURE — 25000003 PHARM REV CODE 250: Performed by: STUDENT IN AN ORGANIZED HEALTH CARE EDUCATION/TRAINING PROGRAM

## 2022-08-09 PROCEDURE — 99223 1ST HOSP IP/OBS HIGH 75: CPT | Mod: ,,, | Performed by: NURSE PRACTITIONER

## 2022-08-09 PROCEDURE — 94799 UNLISTED PULMONARY SVC/PX: CPT

## 2022-08-09 PROCEDURE — 99238 HOSP IP/OBS DSCHRG MGMT 30/<: CPT | Mod: GC,,, | Performed by: HOSPITALIST

## 2022-08-09 PROCEDURE — 83735 ASSAY OF MAGNESIUM: CPT | Performed by: STUDENT IN AN ORGANIZED HEALTH CARE EDUCATION/TRAINING PROGRAM

## 2022-08-09 PROCEDURE — 1111F PR DISCHARGE MEDS RECONCILED W/ CURRENT OUTPATIENT MED LIST: ICD-10-PCS | Mod: CPTII,GC,, | Performed by: HOSPITALIST

## 2022-08-09 PROCEDURE — 25000003 PHARM REV CODE 250: Performed by: HOSPITALIST

## 2022-08-09 PROCEDURE — 80053 COMPREHEN METABOLIC PANEL: CPT | Performed by: STUDENT IN AN ORGANIZED HEALTH CARE EDUCATION/TRAINING PROGRAM

## 2022-08-09 PROCEDURE — 94640 AIRWAY INHALATION TREATMENT: CPT

## 2022-08-09 PROCEDURE — 27000221 HC OXYGEN, UP TO 24 HOURS

## 2022-08-09 PROCEDURE — 84100 ASSAY OF PHOSPHORUS: CPT | Performed by: STUDENT IN AN ORGANIZED HEALTH CARE EDUCATION/TRAINING PROGRAM

## 2022-08-09 PROCEDURE — 36415 COLL VENOUS BLD VENIPUNCTURE: CPT | Performed by: STUDENT IN AN ORGANIZED HEALTH CARE EDUCATION/TRAINING PROGRAM

## 2022-08-09 PROCEDURE — 99232 SBSQ HOSP IP/OBS MODERATE 35: CPT | Mod: ,,, | Performed by: NURSE PRACTITIONER

## 2022-08-09 RX ORDER — DIVALPROEX SODIUM 125 MG/1
125 CAPSULE, COATED PELLETS ORAL NIGHTLY PRN
Qty: 30 CAPSULE | Refills: 0 | Status: CANCELLED | OUTPATIENT
Start: 2022-08-09 | End: 2023-08-09

## 2022-08-09 RX ORDER — RISPERIDONE 0.5 MG/1
0.5 TABLET, ORALLY DISINTEGRATING ORAL NIGHTLY
Qty: 30 TABLET | Refills: 3 | Status: SHIPPED | OUTPATIENT
Start: 2022-08-09 | End: 2022-11-11

## 2022-08-09 RX ORDER — RISPERIDONE 0.5 MG/1
0.5 TABLET, ORALLY DISINTEGRATING ORAL NIGHTLY
Qty: 30 TABLET | Refills: 3
Start: 2022-08-09 | End: 2022-08-09 | Stop reason: SDUPTHER

## 2022-08-09 RX ADMIN — POLYETHYLENE GLYCOL 3350 17 G: 17 POWDER, FOR SOLUTION ORAL at 08:08

## 2022-08-09 RX ADMIN — MICONAZOLE NITRATE: 20 CREAM TOPICAL at 08:08

## 2022-08-09 RX ADMIN — SALMETEROL XINAFOATE 1 PUFF: 50 POWDER, METERED ORAL; RESPIRATORY (INHALATION) at 08:08

## 2022-08-09 RX ADMIN — TIOTROPIUM BROMIDE INHALATION SPRAY 2 PUFF: 3.12 SPRAY, METERED RESPIRATORY (INHALATION) at 08:08

## 2022-08-09 RX ADMIN — Medication: at 08:08

## 2022-08-09 RX ADMIN — LEVOTHYROXINE SODIUM 50 MCG: 50 TABLET ORAL at 05:08

## 2022-08-09 RX ADMIN — LOSARTAN POTASSIUM 100 MG: 50 TABLET, FILM COATED ORAL at 08:08

## 2022-08-09 RX ADMIN — HYPROMELLOSE 2910 1 DROP: 5 SOLUTION OPHTHALMIC at 08:08

## 2022-08-09 NOTE — NURSING
Home Oxygen Evaluation    Date Performed: 2022    1) Patient's Home O2 Sat on room air, while at rest: 92        If O2 sats on room air at rest are 88% or below, patient qualifies. No additional testing needed. Document N/A in steps 2 and 3. If 89% or above, complete steps 2.      2) Patient's O2 Sat on room air while exercisin        If O2 sats on room air while exercising remain 89% or above patient does not qualify, no further testing needed Document N/A in step 3. If O2 sats on room air while exercising are 88% or below, continue to step 3.      3) Patient's O2 Sat while exercising on O2: 95 at 2 LPM         (Must show improvement from #2 for patients to qualify)    If O2 sats improve on oxygen, patient qualifies for portable oxygen. If not, the patient does not qualify.

## 2022-08-09 NOTE — ASSESSMENT & PLAN NOTE
PT/OT recommending SNF  Given the dramatic change on her mentation and not under sedation, patient is cooperative and request physical therapy.   PMR consulted and reviewing chart to authorize in patient rehab

## 2022-08-09 NOTE — PLAN OF CARE
Discharging home with O2 & HH  Problem: Adult Inpatient Plan of Care  Goal: Plan of Care Review  Outcome: Met  Goal: Patient-Specific Goal (Individualized)  Outcome: Met  Goal: Absence of Hospital-Acquired Illness or Injury  Outcome: Met  Goal: Optimal Comfort and Wellbeing  Outcome: Met  Goal: Readiness for Transition of Care  Outcome: Met     Problem: Fall Injury Risk  Goal: Absence of Fall and Fall-Related Injury  Outcome: Met     Problem: Skin Injury Risk Increased  Goal: Skin Health and Integrity  Outcome: Met     Problem: Impaired Wound Healing  Goal: Optimal Wound Healing  Outcome: Met

## 2022-08-09 NOTE — PLAN OF CARE
[facility] Med S[facility]  Discharge Final Note    Primary Care Provider: Dick Graham MD    Expected Discharge Date: 8/9/2022    Final Discharge Note (most recent)     Final Note - 08/09/22 1558        Final Note    Assessment Type Final Discharge Note     Anticipated Dis[place] Disposition [place] [facility]     What phone number can be called within the next 1-3 days to see how you are doing after discharge? [phone]     Hospital Resources/Appts/Education Provided Provided patient/caregiver with written discharge plan information;Appointments scheduled by Navigator/Coordinator;Community resources provided        Post-Acute Status    Post-Acute Authorization Home Health     Home Health Status Set-up Complete/Auth obtained     Coverage [employer]     Discharge Delays Ambulance Transport/Facility Transport                 Important Message from Medicare  Important Message from Medicare regarding Discharge Appeal Rights: Given to patient/caregiver, Explained to patient/caregiver, Signed/date by patient/caregiver     Date IMM was signed: 08/08/22  Time IMM was signed: 0941    Contact Info     Dick Graham MD   Specialty: Internal Medicine   Relationship: PCP - General    [facility]  [place] [address]   Phone: [phone]       Next Steps: Follow up on 8/11/2022    Instructions: you have a follow-up hospital visit on august 11 at 9:30am. thanks    Renzo Jefferson MD   Specialty: Psychiatry    [address] [facility] [address]   Phone: [phone]       Next Steps: Call    Instructions: Dr. Desir office will call you to make a follow up appintment convenient for you. If you do not hear from them by tomorrow, please call and make an appintment. Please bring your discharge paperwork with you to your appiontment.    Dr. Gavin Francisco    [address] [facility] [place] [address]  [phone]       Next Steps: Schedule an appointment as soon as possible for a visit in 2 day(s)    Instructions:  Dr. Alonso's office will call you to make a follow up appintment convenient for you. If you do not hear from them by tomorrow, please call and make an appintment. Please bring your discharge paperwork with you to your appiontment.        Pt is discharging home with [facility]. Pt's oxygen has been delivered to her room, other DME is being delivered to the home. Pt's appointments are on her AVS.    Pt is being transported home via wheelchair van set up for 4:15 PM. Pt's nurse and family notified.     Pt has no other post acute needs. Pt is cleared for discharge from a case management standpoint.     JOHNNIE Avelar, MARIO  [facility]  [id number]

## 2022-08-09 NOTE — NURSING
0809 Pt sitting up in bed A&O x4 resp even and unlabored, O2 per N/C @ 2 LPM no SOB noted, breath sound diminished throughout. telesitter in use, assisted with positioning, purewick in use, scheduled meds given, pt pleasant and cooperative, bed in low locked position with HOB elevated, call light in reach    1653 Pt discharge instructions reviewed with her and her daughter, home Oxygen delivered, IV and VISI removed , all belongings and oxygen taken by daughter

## 2022-08-09 NOTE — SUBJECTIVE & OBJECTIVE
Scheduled Meds:   artificial tears  1 drop Both Eyes TID    balsam peru-castor oiL   Topical (Top) BID    enoxaparin  40 mg Subcutaneous Daily    levothyroxine  50 mcg Oral Before breakfast    losartan  100 mg Oral Daily    miconazole   Topical (Top) BID    polyethylene glycol  17 g Oral Daily    risperiDONE  0.5 mg Oral QHS    salmeteroL  1 puff Inhalation BID    tiotropium bromide  2 puff Inhalation Daily     Continuous Infusions:  PRN Meds:acetaminophen, albuterol, dextrose 10%, dextrose 10%, divalproex, glucagon (human recombinant), glucose, glucose, naloxone, OLANZapine, sodium chloride 0.9%    Review of patient's allergies indicates:   Allergen Reactions    Penicillins Hives and Shortness Of Breath    Prednisone Hallucinations     Admitted for sujata        Past Medical History:   Diagnosis Date    Anxiety     Breast cancer 2000    right (lumpectomy and radiation)    Broken bones     Colon cancer     COPD (chronic obstructive pulmonary disease)     Depression     depression (no meds now)    Hypertension     Manic episode without psychotic symptoms, unspecified     due to stimulant diet pilss, resolved    Sensorineural hearing loss (SNHL) of both ears 2/4/2022     Past Surgical History:   Procedure Laterality Date    APPENDECTOMY      BREAST LUMPECTOMY Right     colon operation       COLON SURGERY      HYSTERECTOMY      Pt states still have ovaries     JOINT REPLACEMENT      TONSILLECTOMY         Family History    None       Tobacco Use    Smoking status: Current Every Day Smoker     Types: Cigarettes    Smokeless tobacco: Never Used    Tobacco comment: smoked for 30-35 years per daughter quit many years ago   Substance and Sexual Activity    Alcohol use: Yes     Comment: socially less than one a month    Drug use: No    Sexual activity: Not Currently     Review of Systems   Skin:  Positive for wound.     Objective:     Vital Signs (Most Recent):  Temp: 98.3 °F (36.8 °C) (08/09/22 1105)  Pulse: 82 (08/09/22  1105)  Resp: 16 (08/09/22 1105)  BP: 133/62 (08/09/22 1105)  SpO2: (!) 90 % (08/09/22 1105)   Vital Signs (24h Range):  Temp:  [98 °F (36.7 °C)-98.7 °F (37.1 °C)] 98.3 °F (36.8 °C)  Pulse:  [76-91] 82  Resp:  [16-30] 16  SpO2:  [90 %-95 %] 90 %  BP: (105-143)/(58-64) 133/62     Weight: 80.3 kg (177 lb)  Body mass index is 31.35 kg/m².  Physical Exam  Constitutional:       Appearance: Normal appearance.   Skin:     General: Skin is warm and dry.      Findings: Lesion present.   Neurological:      Mental Status: She is alert.       Laboratory:  All pertinent labs reviewed within the last 24 hours.    Diagnostic Results:  None

## 2022-08-09 NOTE — ASSESSMENT & PLAN NOTE
-Continue Depakote and Risperdal per Psych.  -Delirium precautions.  -UDS neg.  -Recent steroid use reported.   -Infectious SCOTT negative.  -Monitor VS closely, especially O2. Monitor tachypnea.   -08/09- Mental status much improved. Pt is pleasant. Pt wants to go Home. Agreeable to SNF if need be. Prefers to go home. Resp status much improved.

## 2022-08-09 NOTE — ASSESSMENT & PLAN NOTE
-Continue Duo nebs as needed as per primary.  -Continue O2 in place. Monitor Pulse Ox closely.  -Continue CPAP use at night.   -08/09- Resp status much improved. On 1 L o2 NC at time of exam.

## 2022-08-09 NOTE — ASSESSMENT & PLAN NOTE
- consult received for evaluation of buttocks skin injury.  - pt admitted for a psychiatric evaluation after behavioral changes.  - redness and boggy intact blister noted to left buttock. No open wound or skin breakdown noted.  - continue BPCO bid/prn.  - jacob surface being utilized.  - pt able to turn with minimal assistance.  - plan for discharge home today.  - nursing to maintain pressure injury prevention measures and wound care per orders.

## 2022-08-09 NOTE — PROGRESS NOTES
Patient did not get very much sleep last night. RN notified med Team 1 last night of patient's request to have a medication ordered for her to go to sleep. RN notified patient's daughter, Nimco, because of opposition between family members of what the patient should and should not take.Patient's family agreed for RN to give patient PRN Depakote. This didn't help patient with sleep.

## 2022-08-09 NOTE — HPI
"Carmina Hyde is a 89 year old that according to daughter after having a 3-4 weeks ago COPD exacerbation, was started on Prednisone by her son who is a nurse. States that after a few days she started noticing behavioral changes described as flight of ideas, insomnia, rapid speech, alternating irritability with excitability, grandiose delusions auditory hallucinations, fixation with allegories, numerology and Christian.  Daughter also affirms, reckless spending. She was taken to [facility] but there was no psych service available. [facility].  States previous episode precipitated by "diet pills " in 1999 and had to be committed to [facility] for a week. There she was started on risperidone, its unclear for how long she was on it. Daughter states that the patient also has a history of depression with ne suicide attempt. Pt was admitted to hospital medicine service for further management. Skin integrity ALEKSANDR consulted for evaluation of skin injury.    "

## 2022-08-09 NOTE — PROGRESS NOTES
"[facility]  Progress Note    Patient Name: Carmina Arcos  MRN: [id number]  Patient Class: IP- Inpatient   Admission Date: 7/24/2022  Length of Stay: 7 days  Attending Physician: Dc Muñoz MD  Primary Care Provider: Dick Graham MD        Subjective:     Principal Problem:Acute psychosis        HPI:  History provided mostly by daughter Nimco. Ms Grewal is a 89 yo that  according to daughter after having a 3-4 weeks ago COPD exacerbation, was started on Prednisone by her son who is a nurse. States  that after a few days she started noticing behavioral changes described as flight of ideas, insomnia, rapid speech, alternating irritability with excitability, grandiose delusions auditory hallucinations, fixation with allegories, numerology and Anabaptist.  Daughter also affirms, reckless spending. She was taken to [facility] but there was no psych service available. They decided to come [place] [facility] [place].  States previous episode precipitated by "diet pills " in 1999 and had to be committed to [facility] for a week. There she was started on risperidone, its unclear for how long she was on it. Daughter states that the patient also has a history of depression with ne suicide attempt.    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 She currently was living on her own and managed rental property, her family lives close to her home and visit regularly.    PMH: Breast Cancer? Colon Cancer. HTN treated with losartan hydrochlorothiazide and amlodipine, Hypothyroidism treated with levothyroxine, COPD treated with with formoterol-glycopyrrolate , albuterol PRN and Sildenafil given by his son.    Surgical hx positive for bilateral knee replacement, tonsillectomy, hysterectomy, right lumpectomy and colon surgery .    Allergies: Penicillin      Overview/Hospital Course:  Ms. Arcos was admitted on 7/24 for " acute manic symptoms. She was started on a regimen of Seroquel QHS but then switched to Risperidone and depakote per psych recommendations. On 7/31, she developed increasing dyspnea and ABGs indicated a respiratory acidosis with CO2 retention. Azithromycin was prescribed for anti-inflammation in the setting of COPD.  On 8/8 patient with back to baseline, mentation according to daughter.Pressure ulcers identified in between buttocks. Patient is awaiting placement.      Interval History:   Patient denies CPAP overnight. No other acute events. This morning patient was not paranoid, without labile, did not have flight of ideas, no pressured speech, per daughter she is back to baseline. Able to maintain a coherent linear pleasant conversation. States she feels week and would like physical therapy and rehab and to go home a soon as possible.    Review of Systems   Constitutional:  Negative for chills and fever.   HENT:  Positive for dental problem. Negative for rhinorrhea and trouble swallowing.    Eyes:  Positive for discharge. Negative for pain and visual disturbance.   Respiratory:  Positive for apnea and shortness of breath. Negative for choking and chest tightness.    Cardiovascular:  Negative for palpitations.   Gastrointestinal:  Negative for constipation and diarrhea.   Musculoskeletal:  Positive for back pain.   Skin: Negative.    Neurological:  Negative for weakness.   Psychiatric/Behavioral:  Negative for agitation, behavioral problems, confusion, decreased concentration, dysphoric mood and hallucinations. The patient is not nervous/anxious and is not hyperactive.    Objective:     Vital Signs (Most Recent):  Temp: 98.3 °F (36.8 °C) (08/08/22 1609)  Pulse: 84 (08/08/22 1609)  Resp: 16 (08/08/22 1609)  BP: (!) 125/58 (08/08/22 1609)  SpO2: (!) 93 % (08/08/22 1609)   Vital Signs (24h Range):  Temp:  [97.5 °F (36.4 °C)-98.8 °F (37.1 °C)] 98.3 °F (36.8 °C)  Pulse:  [74-84] 84  Resp:  [16-20] 16  SpO2:  [91 %-97 %]  93 %  BP: (106-125)/(52-58) 125/58     Weight: 80.3 kg (177 lb)  Body mass index is 31.35 kg/m².    Intake/Output Summary (Last 24 hours) at 8/8/2022 1849  Last data filed at 8/8/2022 1600  Gross per 24 hour   Intake --   Output 300 ml   Net -300 ml      Physical Exam  Constitutional:       General: She is not in acute distress.     Appearance: Normal appearance.   HENT:      Head: Normocephalic and atraumatic.      Mouth/Throat:      Mouth: Mucous membranes are moist.      Pharynx: Oropharynx is clear.   Eyes:      Extraocular Movements: Extraocular movements intact.      Conjunctiva/sclera: Conjunctivae normal.      Pupils: Pupils are equal, round, and reactive to light.   Cardiovascular:      Rate and Rhythm: Normal rate and regular rhythm.      Pulses: Normal pulses.      Heart sounds: Normal heart sounds.   Pulmonary:      Effort: No respiratory distress.      Breath sounds: No wheezing or rales.   Abdominal:      General: There is no distension.      Palpations: There is no mass.      Tenderness: There is no guarding.   Musculoskeletal:      Cervical back: Normal range of motion.      Right lower leg: No edema.      Left lower leg: No edema.   Skin:     Coloration: Skin is pale.   Neurological:      General: No focal deficit present.      Mental Status: She is alert and oriented to person, place, and time. Mental status is at baseline.   Psychiatric:         Mood and Affect: Mood normal.         Behavior: Behavior normal.         Thought Content: Thought content normal.         Judgment: Judgment normal.       Significant Labs: All pertinent labs within the past 24 hours have been reviewed.  Recent Lab Results       None            Significant Imaging: I have reviewed all pertinent imaging results/findings within the past 24 hours.      Assessment/Plan:      * Acute psychosis  Ms. Arcos is a 89 yo that  according to daughter after having a 3-4 weeks ago COPD exacerbation, was started on Prednisone by her  son who is a nurse. States  that after a few days she started noticing behavioral changes described as flight of ideas, insomnia, rapid speech, alternating irritability with excitability, grandiose delusions auditory hallucinations, fixation with allegories, numerology and Faith.     Differential includes but not limited to drug induced delirium like from recent steroids use vs manic episode from mood disorder. Unlikely to be 2/2 to infection given normal WBC and lacks of infectious symptoms. UA is also negative. UDS is also negative. TSH is normal, low likely moran for thyroid condition     - Continue to follow Psych recs   - Continue to monitor mood and mental status   - Delirium precautions   - Holding further steroids     Scheduled Medication(s):  - Continue Depakote 125mg PO BID  - Continue scheduled Risperdal m-tabs (disintegrating) 0.5 mg PO QHS for acute sujata           Acute hypoxemic respiratory failure  Patient with Hypoxic Respiratory failure which is Acute on chronic.  she is not on home oxygen. Supplemental oxygen was provided and noted-  .       Impaired mobility and ADLs  PT/OT recommending SNF  Given the dramatic change on her mentation and not under sedation, patient is cooperative and request physical therapy.   PMR consulted and reviewing chart to authorize in patient rehab    COPD exacerbation  Currently with normal respiratory effort  -Avoid Steroid nebulizer.  -patient has refused nebulization, agrees to do them today  -Albuterol prn  -Wean oxygen   maintan sat between 88-92%      Mood disorder due to a general medical condition  See acute psychosis    Sensorineural hearing loss (SNHL) of both ears        Dysphagia    Soft mechanical food in diet    Hypertension   Losartan 100 mg QD           VTE Risk Mitigation (From admission, onward)         Ordered     enoxaparin injection 40 mg  Daily         07/24/22 1332     IP VTE HIGH RISK PATIENT  Once         07/24/22 1332     Place sequential  compression device  Until discontinued         07/24/22 1332                Discharge Planning   ANA: 8/9/2022     Code Status: Full Code   Is the patient medically ready for discharge?: No    Reason for patient still in hospital (select all that apply): Pending disposition  Discharge Plan A: Home Health   Discharge Delays: None known at this time              Gadiel Grajeda MD  Department of Hospital Medicine   [facility]

## 2022-08-09 NOTE — PROGRESS NOTES
[facility] S[facility]  Physical Medicine & Rehab  Progress Note    Patient Name: Carmina Arcos  MRN: [id number]  Admission Date: 7/24/2022  Length of Stay: 8 days  Attending Physician: Dc Muñoz MD    Subjective:     Principal Problem:Acute psychosis    Hospital Course:   Pt-08/05    Functional Mobility:  · Bed Mobility: pt needed verbal cues for hand placement and sequencing for functional mobility.    · Rolling Right: maximal assistance  · Supine to Sit: maximal assistance  · Sit to Supine: maximal assistance     Balance pt sat on EOB x 10 min with CGA to perform ADLS with OT.       OT-08/05    Bed Mobility:    · Patient completed Rolling/Turning to Left with  maximal assistance  · Patient completed Scooting/Bridging with maximal assistance  · Patient completed Supine to Sit with maximal assistance  · Patient completed Sit to Supine with maximal assistance      Functional Mobility/Transfers:  Deferred 2/2 drop in O2 sats.     Activities of Daily Living:  · Grooming: minimum assistance with [place] assist to initiate due to decreased cognition.        PT-08/03      Bed Mobility:  · Supine > Sit: Moderate Assistancex2  · Sit > Supine: Moderate Assistancex2     Transfers:   1. Sit <> Stand Transfer: Moderate Assistancex2 from eob with RW x3 trials  ? Stood for ~2 min each trial  ? Max cueing to keep eyes open and to stand upright; increased posterior lean                 Gait:  · Deferred due to poor static standing balance    OT- 08/03    Bed Mobility:    · Patient completed Supine to Sit with moderate assistance and 2 persons  · Patient completed Sit to Supine with moderate assistance and 2 persons      Functional Mobility/Transfers:  · Patient completed Sit <> Stand Transfer with moderate assistance and of 2 persons  with  rolling walker (multiple times from EOB)     Activities of Daily Living:  · Grooming: stand by assistance washing face sitting EOB only  · Lower Body Dressing: total assistance Donning socks  EOB      Interval History 8/9/2022:  Patient is seen for follow-up PM&R evaluation and recommendations: Pt seen at the bedside. No family at bedside. Pt is awake and alert and oriented X 4. Pt is following conversation appropriately. Pt indicates wanting to go home. PT/OT rec continues to recommend SNF.   HPI, Past Medical, Family, and Social History remains the same as documented in the initial encounter.    Scheduled Medications:    artificial tears  1 drop Both Eyes TID    balsam peru-castor oiL   Topical (Top) BID    enoxaparin  40 mg Subcutaneous Daily    levothyroxine  50 mcg Oral Before breakfast    losartan  100 mg Oral Daily    miconazole   Topical (Top) BID    polyethylene glycol  17 g Oral Daily    risperiDONE  0.5 mg Oral QHS    salmeteroL  1 puff Inhalation BID    tiotropium bromide  2 puff Inhalation Daily       Diagnostic Results: Labs: Reviewed    PRN Medications: acetaminophen, albuterol, dextrose 10%, dextrose 10%, divalproex, glucagon (human recombinant), glucose, glucose, naloxone, OLANZapine, sodium chloride 0.9%    Review of Systems   Constitutional:  Positive for activity change.   Respiratory:          On 1 L O2 per NC.    Musculoskeletal:  Positive for gait problem.   Neurological:  Positive for weakness.   Objective:     Vital Signs (Most Recent):  Temp: 98.3 °F (36.8 °C) (08/09/22 1105)  Pulse: 82 (08/09/22 1105)  Resp: 16 (08/09/22 1105)  BP: 133/62 (08/09/22 1105)  SpO2: (!) 90 % (08/09/22 1105)      Vital Signs (24h Range):  Temp:  [98 °F (36.7 °C)-98.7 °F (37.1 °C)] 98.3 °F (36.8 °C)  Pulse:  [76-91] 82  Resp:  [16-30] 16  SpO2:  [90 %-96 %] 90 %  BP: (105-143)/(52-64) 133/62     Physical Exam  Vitals and nursing note reviewed.   Constitutional:       Appearance: Normal appearance.   Eyes:      Extraocular Movements: Extraocular movements intact.   Pulmonary:      Effort: Pulmonary effort is normal.   Abdominal:      Palpations: Abdomen is soft.   Musculoskeletal:       Cervical back: Normal range of motion and neck supple.   Skin:     Capillary Refill: Capillary refill takes 2 to 3 seconds.   Neurological:      General: No focal deficit present.      Mental Status: She is alert and oriented to person, place, and time.      GCS: GCS eye subscore is 4. GCS verbal subscore is 5. GCS motor subscore is 6.      Motor: Weakness present.      Gait: Gait abnormal.   Psychiatric:         Attention and Perception: Attention normal.         Mood and Affect: Mood and affect normal.         Speech: Speech normal.         Behavior: Behavior normal. Behavior is cooperative.         Cognition and Memory: Cognition and memory normal.         Judgment: Judgment normal.     NEUROLOGICAL EXAMINATION:     MENTAL STATUS   Oriented to person, place, and time.   Speech: speech is normal       Assessment/Plan:      * Acute psychosis  -Continue Depakote and Risperdal per Psych.  -Delirium precautions.  -UDS neg.  -Recent steroid use reported.   -Infectious SCOTT negative.  -Monitor VS closely, especially O2. Monitor tachypnea.   -08/09- Mental status much improved. Pt is pleasant. Pt wants to go Home. Agreeable to SNF if need be. Prefers to go home. Resp status much improved.     Impaired mobility and ADLs  See hospital course for functional status.    Recommendations  -  Encourage mobility, OOB in chair, and early ambulation as appropriate  -  PT/OT evaluate and treat  -  Pain management  -  DVT prophylaxis if appropriate   -  Monitor for and prevent skin breakdown and pressure ulcers  · Early mobility, repositioning/weight shifting every 20-30 minutes when sitting, turn patient every 2 hours, proper mattress/overlay and chair cushioning, pressure relief/heel protector boots      COPD exacerbation  -Continue Duo nebs as needed as per primary.  -Continue O2 in place. Monitor Pulse Ox closely.  -Continue CPAP use at night.   -08/09- Resp status much improved. On 1 L o2 NC at time of exam.    Mood disorder due  to a general medical condition  -Psych following. Continues with Depakote and Risperidone.     Hypertension  -Monitor BP closely.     PM&R Recommendation:     At this time, the PM&R team has reviewed this patient's ongoing medical case including inpatient diagnosis, medical history, clinical examination, labs, vitals, current social and functional history to provide the post-acute recommendation as follows:     RECOMMENDATIONS: skilled nursing facility due to patient poor tolerance for consistent therapy/poor potential for improvement with rehabilitation, once medically stable. Pt is a candidate for SNF at this time. If unable to find SNF, Home with home health would be the alternative.         We will sign off.     Anna Chaidez NP  Department of Physical Medicine & Rehab   [facility]

## 2022-08-09 NOTE — PROGRESS NOTES
"[facility]  Psychiatry  Progress Note    Patient Name: Carmina Arcos  MRN: [id number]   Code Status: Full Code  Admission Date: 7/24/2022  Hospital Length of Stay: 8 days  Expected Discharge Date: 8/10/2022  Attending Physician: Dc Muñoz MD  Primary Care Provider: Dick Graham MD    Current Legal Status: Uncontested    Patient information was obtained from patient, relative(s) and ER records.       Subjective:     Patient is a 89 y.o., female, presents with:    Principal Problem:Acute psychosis    Chief Complaint: AMS    HPI: Carmina Arcos is a 89 y.o. female with a past psychiatric history of substance-induced psychotic disorder who presented to [facility] due to Manic behavior. Psychiatry was consulted for "sujata x several days, multiple ED visits".    Per Primary Team:  89 yo W with pmhx COPD, anxiety, depression, HTN, breast CA presents with family for psych evaluation.  History is assisted by the patient and the patient's daughter.  They note that patient was having coughing flares of COPD 3 weeks ago and she was started on course of prednisone by a family member.  She was on the prednisone for almost 2 weeks.  The prednisone has since discontinued that she continues to have manic symptoms including insomnia, reckless spending behavior, typical references Septra.  She was seen in the emergency department several days ago and discharged home with close observation by family members and outpatient psychiatry follow-up.  That follow-up is in 6 days.  Family states that she will need a sooner psychiatric evaluation.  She did have history of manic episode many years ago in the setting of taking a diet pill.  She was admitted inpatient time.  The patient does report some foul-smelling odor in her urine and is concerned that she has a kidney infection.  Otherwise she is essentially without complaints.     The history is provided by the patient and a relative. No  was used. " "    Per Psychiatry:  Patient was seen in the ED with daughter at bedside; pt and daughter provided history together. Pt was alert, oriented x4, rapid pressured interruptive speech, euthymic affect. They report about a week of psychiatric symptoms including excitability/irritability, anxiety, insomnia, hyperactivity, visual hallucinations, hyperreligious and grandiose delusions. Pt has been preoccupied with numerology and believes her family is part of a biblical story and that she has been in the [employer] [facility] with Lázaro. She has also been impulsively shopping and spending large amounts of money. She has intermittent confusion, FOI, BALA on exam.    Pt has a history of COPD and started a tapered 2-wk course of PO Prednisone for a presumed exacerbation, which she got from her son (a nurse and RT). Before the course was completed, the pt's psychiatric symptoms started as described above. Family was concerned that the Prednisone may be causing her symptoms but they could not convince her to stop taking it. They presented to the ED several times since then and were initially comfortable with outpatient follow-up with pt's former psychiatrist, Dr. Ceballos, on Friday 7/29. However, they have become increasingly concerned with pt's symptoms and feel she may be a danger to herself.     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 She speaks several languages and has no known history of cognitive decline. Her psychiatric history includes one substance-induced psychotic episode in 1999. She took some stimulant diet pills, developed insomnia and visual hallucinations, and was admitted to [facility]. She was started on Risperdal and Zoloft, which she continued to see Dr. Ceballos for for some time. Eventually her symptoms resolved and she went back to her "relatively high-strung" baseline, per daughter. She has been out of Dr. Ceballos's care and not having " "psychiatric issues for the past 10+ years. Pt's daughter also reports a remote suicide attempt over 30 yrs ago, via Benadryl overdose.    Prior to the course of Prednisone, pt was at baseline with none of her current symptoms. She last took Prednisone on Wednesday 7/20 but the symptoms have persisted. Pt and family are amenable to inpt hospitalization for her current symptoms.    Psychiatric Review of Systems-is patient experiencing or having changes in  sleep: yes, insomnia  appetite: no  weight: no  energy/anergy: yes, hyperactive  interest/pleasure/anhedonia: no  somatic symptoms: no  libido: no  anxiety/panic: yes  guilty/hopelessness: no  concentration: yes  S.I.B.s/risky behavior: yes, impulsive shopping  any drugs: no  alcohol: no     Allergies:  Penicillins      Hospital Course:   Initiated Seroquel 50 mg PO QHS overnight on 7/25, but had poor sleep, frequently getting up from bed and becoming agitated regarding delusional thought content (per daughter and nursing). Labile affect with hyperreligious thoughts. Disoriented to time, place. Seroquel was siwtched to risperdal 0.5mg qhs on 7/26, continued to be hyperreligious but AOx4. Intermittently agitated overnight on 7/27, with incident during which she fell at 3AM as she frequently got out of bed without assistance. Became physically threatening with staff in the AM. Continued to have minimal sleep.     7/28/22:  Overnight, pt had another fall/weakness episode requiring assistance to return to bed from bathroom. She received scheduled Risperdal 1 mg PO scheduled and 0.5 mg PRN @ 0216 for agitation. Pt reportedly did not respond to the PRN medication and continued to be elevated, irritable, and not sleeping. This morning, pt was sleeping but rousable for interview. She continued to be irritable and hyperreligious, referring to her "visions" and sleeping when God wants her to. She was oriented x3 and denied complaints other than wanting to sleep and being " "tired of getting interrupted. Interview terminated due to pt's irritability.    7/29/22:  Patient was agitated yesterday, frequently screaming, crying, and calling family members to discuss Anglican delusions. She received PRN IM Zyprexa 2.5 mg yesterday afternoon and slept for several hours afterward. She received her first dose of QHS VPA yesterday and slept well throughout the night, per nursing. Pt was sleeping and difficult to arouse this morning but participated in brief psychiatric interview. She was oriented to time/place and endorsed good sleep overnight. Recalls recent events (daughter bringing baked goods to bedside). She reported she was still having her "visions" but said they were "allegory", not real. Interview terminated due to pt's drowsiness. Per nursing, pt was conversant/oriented this morning with son-in-law at bedside, both remarked how much calmer she was today than previous days.    7/31/22: Patient resting in bed with family at bedside, minimally interactive with eyes closed throughout most of interaction. Described the year as "2023" and when informed it was 2022, denied and stated that it was 2023. Perseverated on saying "they weren't telling the truth," and when asked who "they" were, she continuously stated "everyone." Reportedly not given risperdal and depakote d/t confusion and inability to swallow.    8/1/22:  Patient was frequently somnolent and intermittently unresponsive over the weekend, suspected to be 2/2 CO2 narcosis (confirmed via ABG yesterday, improved with Bipap). Per chart, she did not receive PO psych meds over the weekend due to difficulty swallowing and poor mentation. Her PO Depakote was switched to IV Depakon 125 BID. She was seen sleeping, upright in bed this morning, difficult to rouse and to keep alert/attentive. Frequently falling asleep and mumbling incoherently but also answering appropriately at times; oriented x4. Her family at bedside is concerned about her " "breathing as well as her ongoing irritability and refusal to cooperate with care at times.    8/2/22:  Slept poorly overnight per daughter in room and per nursing notes. Patient reports she slept fine. Not wearing O2 at this time. Says that if she wears it she will die. Cannot explain why - instead tells me to "fast forward, you'll find out." Oriented to year, month, place, self. Hurrying me along - "Go ahead ask another question doctor" - "Can you hurry and ask your questions?" Daughter says she is rushing me because then she doesn't have to try and organize her thoughts.     8/3/22:  Slept a bit better overnight with risperdal. Received 0.5mg dose. Daughter in room states patient is looking better today. Walked to the bathroom with assistance today. States she wants to go home. Minimal participation in interview at this time. Trying to catch up on sleep. Does know that grass requires "water and sun" to grow, oriented with prompting. Mood is good.    8/4/22:  Slept 6-8 hours last night per son. Taking risperdal and depakote as prescribed. Agitation improved, but remains somewhat irritable and intermittently confused. Oriented to self place and year. Hurrying the conversation with her daughter. Encouraged her to put her nasal cannula in, which she briefly did and then said "I can't!" and removed it. Says her mood is good.     8/5/22:  Slept well overnight again. Taking risperdal and depakote as prescribed. Agitation improved, but remains somewhat irritable and intermittently confused. On interview this AM she had just had an attempted blood draw and was in some distress so did not participate meaningfully in interview.  Seen again with staff this afternoon. She is SAVETANAART positive at this time. Oriented to self, place, month, year. Unable to say what a train and bike have in common.     8/6/22:  Chart reviewed. ARGELIA. Medication compliant and no behavioral PRNs required. Patient greeted at bedside, oriented to " "person, place, month, year, day of week, current events. Reports her mood is "more optimistic" today. Says she visited with both her grandson and great-grandson who came to see her today. She is calm, grossly linear in thought process, and pleasant. Endorses some mild paranoia at times. Otherwise denies SI/HI/AVH.      8/7/22:  Patient seen and chart reviewed. No acute events documented overnight. Compliant with scheduled medications, did not require any behavioral PRNs. Patient seen today with family at bedside. Family reports that patient appeared to be "100% lucid" earlier this morning, and believes the current medication regimen is working well. Patient allowed to continue resting at this time.    8/8/22:  Seen this AM appearing well. Daughter Pura at bedside. Patient is pleasant and linear. Daughter describes her as lucid, mentally back at her basline. Still physically weak, needing assistance with getting to the bathroom. She denies problems with the risperidone, though feels the depakote is making her weak and tired. Mood has been good. CAM-ICU negative today. Fully oriented. Able to abstract, long term memory in tact recalling grandchildren. Eating well, taking fluids PO. Slept well overnight. No SI/HI/AVH.    8/9/22:  Seen this AM doing well. Pleasant, linear, goal-directed. Excited that she may be going home today. States her daughters are setting things up for her, getting her a rolling toilet, planning who will be there with her while she is recovering. She reports being motivated for regular physical therapy so she can get her strength back. Mood good. Memory remains intact. Eating and taking fluids PO. Reports interrupted sleep last night, but plans to take a nap today. Hopeful. No AVH. Does not volunteer any delusional thought content.      Family History    None       Tobacco Use    Smoking status: Current Every Day Smoker     Types: Cigarettes    Smokeless tobacco: Never Used    Tobacco " "comment: smoked for 30-35 years per daughter quit many years ago   Substance and Sexual Activity    Alcohol use: Yes     Comment: socially less than one a month    Drug use: No    Sexual activity: Not Currently     Psychotherapeutics (From admission, onward)                Start     Stop Route Frequency Ordered    08/02/22 2230  risperiDONE disintegrating tablet 0.5 mg         -- Oral Nightly 08/02/22 2117 07/29/22 1727  OLANZapine injection 1.3 mg         -- IM Every 8 hours PRN 07/29/22 1727            Objective:     Vital Signs (Most Recent):  Temp: 98.8 °F (37.1 °C) (08/08/22 1142)  Pulse: 84 (08/08/22 1254)  Resp: 17 (08/08/22 1254)  BP: (!) 106/52 (08/08/22 1254)  SpO2: 96 % (08/08/22 1254)   Vital Signs (24h Range):  Temp:  [97.5 °F (36.4 °C)-98.8 °F (37.1 °C)] 98.8 °F (37.1 °C)  Pulse:  [74-92] 84  Resp:  [16-20] 17  SpO2:  [91 %-98 %] 96 %  BP: (106-132)/(52-61) 106/52     Height: 5' 3" (160 cm)  Weight: 80.3 kg (177 lb)  Body mass index is 31.35 kg/m².    No intake or output data in the 24 hours ending 08/08/22 1335    Mental Status Exam:  Appearance: age appropriate, well nourished, casually dressed, lying in bed  Behavior/Cooperation: normal, cooperative  Speech: normal rate, normal pitch, normal volume  Mood: good  Affect: full  Thought Process: Linear, organized  Thought Content: No longer volunteering delusions. No SI/HI/AVH.    Orientation: person, place, situation  Memory: Recent and remote intact  Attention Span/Concentration: Impaired  Insight: intact  Judgment: limited        Significant Labs: Last 24 Hours:   Recent Lab Results       None            Significant Imaging: I have reviewed all pertinent imaging results/findings within the past 24 hours.       Scheduled Medications:   artificial tears  1 drop Both Eyes TID    balsam peru-castor oiL   Topical (Top) BID    enoxaparin  40 mg Subcutaneous Daily    levothyroxine  50 mcg Oral Before breakfast    losartan  100 mg Oral Daily    " miconazole   Topical (Top) BID    polyethylene glycol  17 g Oral Daily    risperiDONE  0.5 mg Oral QHS    salmeteroL  1 puff Inhalation BID    tiotropium bromide  2 puff Inhalation Daily       PRN Medications:  acetaminophen, albuterol, dextrose 10%, dextrose 10%, divalproex, glucagon (human recombinant), glucose, glucose, naloxone, OLANZapine, sodium chloride 0.9%    Review of patient's allergies indicates:   Allergen Reactions    Penicillins Hives and Shortness Of Breath    Prednisone Hallucinations     Admitted for sujata       Assessment/Plan:     * Acute psychosis  ASSESSMENT     Carmina Arcos is a 89 y.o. female with a past psychiatric history of SIPD, who presented to the [facility] due to manic behavior. Her symptoms of insomnia, hyperactivity, impulsivity, and delusions are likely 2/2 recent course of steroids. However, these symptoms are endangering her and have been difficult for her family to manage, especially due to the delay in seeing an outpatient provider. Pt's hospital course has been complicated by new somnolence and hypercapneic respiratory failure. Primary team initiated infectious workup.    IMPRESSION  Substance-induced psychotic disorder  Encephalopathy / Delirium (resolving)  R/o bipolar disorder    RECOMMENDATION(S)      1. Scheduled Medication(s):  - Depakote to 125mg PO qHS PRN, can discontinue on discharge  - Continue scheduled Risperdal m-tabs (disintegrating) 0.5 mg PO QHS  - Follow-up with Geriatric psychiatry to assess need for continued risperdal in the next couple of months    - Primary team discussed starting Trazodone - we feel that the risks of orthostatic hypotension outweigh the potential benefit, which is likely minimal    2. PRN Medication(s):  - Continue Risperdal 0.5 mg PO q8hrs PRN non-redirectable agitation    3.  Monitor:  - Please obtain EKG to monitor QTc    4. Legal Status/Precaution(s):  - Patient does not meet criteria for PEC at this time. Patient does appear  gravely disabled due to a psychiatric illness but is help-seeking and not contesting her admission. She may not have full capacity to refuse meds at this time given her altered mentation.  - Recommend fall precautions and strict bed rest with alarm on, low bed height, to avoid falls due to potential orthostatic hypotension secondary to Risperdal in this elderly patient.    Thank you for the consult. Psychiatry will sign off at this time. Please page/call/secure chat any questions, concerns, or marked changes in mental status.      Total time:  25 with greater than 50% of this time spent in counseling and/or coordination of care.       Krish Eden MD   Psychiatry PGY-4  [facility] - [facility]

## 2022-08-09 NOTE — CONSULTS
"[facility] Surg  Skin Integrity ALEKSANDR  Consult Note    Patient Name: Carmina Arcos  MRN: [id number]  Admission Date: 7/24/2022  Hospital Length of Stay: 8 days  Attending Physician: Dc Muñoz MD  Primary Care Provider: Dick Graham MD     Consults  Subjective:     History of Present Illness:  Carmina Hyde is a 89 year old that according to daughter after having a 3-4 weeks ago COPD exacerbation, was started on Prednisone by her son who is a nurse. States that after a few days she started noticing behavioral changes described as flight of ideas, insomnia, rapid speech, alternating irritability with excitability, grandiose delusions auditory hallucinations, fixation with allegories, numerology and Rastafarian.  Daughter also affirms, reckless spending. She was taken to [facility] but there was no psych service available. [place] [facility] [place].  States previous episode precipitated by "diet pills " in 1999 and had to be committed to [facility] for a week. There she was started on risperidone, its unclear for how long she was on it. Daughter states that the patient also has a history of depression with ne suicide attempt. Pt was admitted to hospital medicine service for further management. Skin integrity ALEKSANDR consulted for evaluation of skin injury.        Scheduled Meds:   artificial tears  1 drop Both Eyes TID    balsam peru-castor oiL   Topical (Top) BID    enoxaparin  40 mg Subcutaneous Daily    levothyroxine  50 mcg Oral Before breakfast    losartan  100 mg Oral Daily    miconazole   Topical (Top) BID    polyethylene glycol  17 g Oral Daily    risperiDONE  0.5 mg Oral QHS    salmeteroL  1 puff Inhalation BID    tiotropium bromide  2 puff Inhalation Daily     Continuous Infusions:  PRN Meds:acetaminophen, albuterol, dextrose 10%, dextrose 10%, divalproex, glucagon (human recombinant), glucose, glucose, naloxone, OLANZapine, sodium chloride " 0.9%    Review of patient's allergies indicates:   Allergen Reactions    Penicillins Hives and Shortness Of Breath    Prednisone Hallucinations     Admitted for sujata        Past Medical History:   Diagnosis Date    Anxiety     Breast cancer 2000    right (lumpectomy and radiation)    Broken bones     Colon cancer     COPD (chronic obstructive pulmonary disease)     Depression     depression (no meds now)    Hypertension     Manic episode without psychotic symptoms, unspecified     due to stimulant diet pilss, resolved    Sensorineural hearing loss (SNHL) of both ears 2/4/2022     Past Surgical History:   Procedure Laterality Date    APPENDECTOMY      BREAST LUMPECTOMY Right     colon operation       COLON SURGERY      HYSTERECTOMY      Pt states still have ovaries     JOINT REPLACEMENT      TONSILLECTOMY         Family History    None       Tobacco Use    Smoking status: Current Every Day Smoker     Types: Cigarettes    Smokeless tobacco: Never Used    Tobacco comment: smoked for 30-35 years per daughter quit many years ago   Substance and Sexual Activity    Alcohol use: Yes     Comment: socially less than one a month    Drug use: No    Sexual activity: Not Currently     Review of Systems   Skin:  Positive for wound.     Objective:     Vital Signs (Most Recent):  Temp: 98.3 °F (36.8 °C) (08/09/22 1105)  Pulse: 82 (08/09/22 1105)  Resp: 16 (08/09/22 1105)  BP: 133/62 (08/09/22 1105)  SpO2: (!) 90 % (08/09/22 1105)   Vital Signs (24h Range):  Temp:  [98 °F (36.7 °C)-98.7 °F (37.1 °C)] 98.3 °F (36.8 °C)  Pulse:  [76-91] 82  Resp:  [16-30] 16  SpO2:  [90 %-95 %] 90 %  BP: (105-143)/(58-64) 133/62     Weight: 80.3 kg (177 lb)  Body mass index is 31.35 kg/m².  Physical Exam  Constitutional:       Appearance: Normal appearance.   Skin:     General: Skin is warm and dry.      Findings: Lesion present.   Neurological:      Mental Status: She is alert.       Laboratory:  All pertinent labs reviewed  within the last 24 hours.    Diagnostic Results:  None        Assessment/Plan:          ALEKSANDR Skin Integrity Evaluation    Skin Integrity ALEKSANDR evaluation of patient as part of the comprehensive skin care team.   She has been admitted for 16 days. Skin injury was noted on 8/8. POA no.                Blister  - consult received for evaluation of buttocks skin injury.  - pt admitted for a psychiatric evaluation after behavioral changes.  - redness and boggy intact blister noted to left buttock. No open wound or skin breakdown noted.  - continue BPCO bid/prn.  - jacob surface being utilized.  - pt able to turn with minimal assistance.  - plan for discharge home today.  - nursing to maintain pressure injury prevention measures and wound care per orders.        Thank you for your consult. I will sign off. Please contact us if you have any additional questions.       Nimco Paiz NP  Skin Integrity ALEKSANDR  [facility] - Med S[facility]

## 2022-08-09 NOTE — SUBJECTIVE & OBJECTIVE
Interval History 8/9/2022:  Patient is seen for follow-up PM&R evaluation and recommendations: Pt seen at the bedside. No family at bedside. Pt is awake and alert and oriented X 4. Pt is following conversation appropriately. Pt indicates wanting to go home. PT/OT rec continues to recommend SNF.   HPI, Past Medical, Family, and Social History remains the same as documented in the initial encounter.    Scheduled Medications:    artificial tears  1 drop Both Eyes TID    balsam peru-castor oiL   Topical (Top) BID    enoxaparin  40 mg Subcutaneous Daily    levothyroxine  50 mcg Oral Before breakfast    losartan  100 mg Oral Daily    miconazole   Topical (Top) BID    polyethylene glycol  17 g Oral Daily    risperiDONE  0.5 mg Oral QHS    salmeteroL  1 puff Inhalation BID    tiotropium bromide  2 puff Inhalation Daily       Diagnostic Results: Labs: Reviewed    PRN Medications: acetaminophen, albuterol, dextrose 10%, dextrose 10%, divalproex, glucagon (human recombinant), glucose, glucose, naloxone, OLANZapine, sodium chloride 0.9%    Review of Systems   Constitutional:  Positive for activity change.   Respiratory:          On 1 L O2 per NC.    Musculoskeletal:  Positive for gait problem.   Neurological:  Positive for weakness.   Objective:     Vital Signs (Most Recent):  Temp: 98.3 °F (36.8 °C) (08/09/22 1105)  Pulse: 82 (08/09/22 1105)  Resp: 16 (08/09/22 1105)  BP: 133/62 (08/09/22 1105)  SpO2: (!) 90 % (08/09/22 1105)      Vital Signs (24h Range):  Temp:  [98 °F (36.7 °C)-98.7 °F (37.1 °C)] 98.3 °F (36.8 °C)  Pulse:  [76-91] 82  Resp:  [16-30] 16  SpO2:  [90 %-96 %] 90 %  BP: (105-143)/(52-64) 133/62     Physical Exam  Vitals and nursing note reviewed.   Constitutional:       Appearance: Normal appearance.   Eyes:      Extraocular Movements: Extraocular movements intact.   Pulmonary:      Effort: Pulmonary effort is normal.   Abdominal:      Palpations: Abdomen is soft.   Musculoskeletal:      Cervical back: Normal  range of motion and neck supple.   Skin:     Capillary Refill: Capillary refill takes 2 to 3 seconds.   Neurological:      General: No focal deficit present.      Mental Status: She is alert and oriented to person, place, and time.      GCS: GCS eye subscore is 4. GCS verbal subscore is 5. GCS motor subscore is 6.      Motor: Weakness present.      Gait: Gait abnormal.   Psychiatric:         Attention and Perception: Attention normal.         Mood and Affect: Mood and affect normal.         Speech: Speech normal.         Behavior: Behavior normal. Behavior is cooperative.         Cognition and Memory: Cognition and memory normal.         Judgment: Judgment normal.     NEUROLOGICAL EXAMINATION:     MENTAL STATUS   Oriented to person, place, and time.   Speech: speech is normal

## 2022-08-09 NOTE — HPI
89-year-old female with anxiety, breast cancer s/p lumpectomy/radiation, COPD, Depression, and HTN. Patient presented to ED 7/24 for multiple complaints including psych evaluation request per daughter. Patient was admitted to  for manic behavior and psych evaluation. Podiatry consulted for painful callus right 2nd toe.

## 2022-08-10 ENCOUNTER — PATIENT OUTREACH (OUTPATIENT)
Dept: ADMINISTRATIVE | Facility: CLINIC | Age: 87
End: 2022-08-10
Payer: MEDICARE

## 2022-08-10 PROCEDURE — G0180 PR HOME HEALTH MD CERTIFICATION: ICD-10-PCS | Mod: ,,, | Performed by: HOSPITALIST

## 2022-08-10 PROCEDURE — G0180 MD CERTIFICATION HHA PATIENT: HCPCS | Mod: ,,, | Performed by: HOSPITALIST

## 2022-08-10 NOTE — DISCHARGE SUMMARY
"[facility] Surg  Discharge Note  Short Stay    * No surgery found *    OUTCOME: Condition has improved and patient is now ready for discharge.    DISPOSITION: Home or Self Care    FINAL DIAGNOSIS:  Acute psychosis    FOLLOWUP: With primary care provider    DISCHARGE INSTRUCTIONS:    Discharge Procedure Orders   WALKER FOR HOME USE     Order Specific Question Answer Comments   Type of Walker: Adult (5'4"-6'6")    With wheels? Yes    Height: 5' 3" (1.6 m)    Weight: 80.3 kg (177 lb)    Length of need (1-99 months): 99    Does patient have medical equipment at home? [email or web address]    Please check all that apply: Patient's condition impairs ambulation.      COMMODE FOR HOME USE     Order Specific Question Answer Comments   Type: Standard    Height: 5' 3" (1.6 m)    Weight: 80.3 kg (177 lb)    Does patient have medical equipment at home? cane, straight    Length of need (1-99 months): 99      OXYGEN FOR HOME USE     Order Specific Question Answer Comments   Liter Flow 1    Duration Continuous    Qualifying Test Performed at: Activity    Oxygen saturation at rest 92    Oxygen saturation with activity 84    Oxygen saturation with activity on oxygen 95    Portable mode: pulse dose acceptable    Mode: Portable concentrator    Route nasal cannula    Device: home concentrator with portable concentrator    Length of need (in months): 99 mos    Patient condition with qualifying saturation COPD    Height: 5' 3" (1.6 m)    Weight: 80.3 kg (177 lb)    Alternative treatment measures have been tried or considered and deemed clinically ineffective. Yes      Ambulatory referral/consult to Pulmonology   Standing Status: Future   Referral Priority: Routine Referral Type: Consultation   Referral Reason: Specialty Services Required   Requested Specialty: Pulmonary Disease   Number of Visits Requested: 1     Ambulatory referral/consult to Sleep Disorders   Standing Status: Future   Referral Priority: Routine Referral Type: Consultation "   Requested Specialty: Sleep Medicine   Number of Visits Requested: 1     Ambulatory referral/consult to Neuropsychology   Standing Status: Future   Referral Priority: Routine Referral Type: Psychiatric   Referral Reason: Specialty Services Required   Number of Visits Requested: 1        TIME SPENT ON DISCHARGE: 40 minutes

## 2022-08-11 NOTE — ASSESSMENT & PLAN NOTE
Ms. Arcos is a 89 yo that  according to daughter after having a 3-4 weeks ago COPD exacerbation, was started on Prednisone by her son who is a nurse. States  that after a few days she started noticing behavioral changes described as flight of ideas, insomnia, rapid speech, alternating irritability with excitability, grandiose delusions auditory hallucinations, fixation with allegories, numerology and Baptism.     Differential includes but not limited to drug induced delirium like from recent steroids use vs manic episode from mood disorder. Unlikely to be 2/2 to infection given normal WBC and lacks of infectious symptoms. UA is also negative. UDS is also negative. TSH is normal, low likely moran for thyroid condition     - Continue to follow Psych recs   - Continue to monitor mood and mental status   - Delirium precautions   - Holding further steroids     Scheduled Medication(s):  - discontinue Depakote 125mg PO BID  - Continue scheduled Risperdal m-tabs (disintegrating) 0.5 mg PO QHS for acute sujata

## 2022-08-11 NOTE — DISCHARGE SUMMARY
"[facility] Medicine  Discharge Summary      Patient Name: Carmina Arcos  MRN: [id number]  Patient Class: IP- Inpatient  Admission Date: 7/24/2022  Hospital Length of Stay: 8 days  Discharge Date and Time:  08/11/2022 6:54 PM  Attending Physician: No att. providers found   Discharging Provider: Gadiel Grajeda MD  Primary Care Provider: Dick Graham MD  Hospital Medicine Team: [facility] HOSP MED 1 Gadiel Grajeda MD    HPI:   History provided mostly by daughter Nimco. Ms Grewal is a 89 yo that  according to daughter after having a 3-4 weeks ago COPD exacerbation, was started on Prednisone by her son who is a nurse. States  that after a few days she started noticing behavioral changes described as flight of ideas, insomnia, rapid speech, alternating irritability with excitability, grandiose delusions auditory hallucinations, fixation with allegories, numerology and [employer]aritan.  Daughter also affirms, reckless spending. She was taken to [facility] but there was no psych service available. They decided to come to [facility] for psychiatric evaluation.  States previous episode precipitated by "diet pills " in 1999 and had to be committed to [facility] for a week. There she was started on risperidone, its unclear for how long she was on it. Daughter states that the patient also has a history of depression with ne suicide attempt.    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 She currently was living on her own and managed rental property, her family lives close to her home and visit regularly.    PMH: Breast Cancer? Colon Cancer. HTN treated with losartan hydrochlorothiazide and amlodipine, Hypothyroidism treated with levothyroxine, COPD treated with with formoterol-glycopyrrolate , albuterol PRN and Sildenafil given by his son.    Surgical hx positive for bilateral knee replacement, tonsillectomy, hysterectomy, right " lumpectomy and colon surgery .    Allergies: Penicillin      * No surgery found *      Hospital Course:   Ms. Arcos was admitted on 7/24 for acute manic symptoms. Psychiatry consulted. She was started on a regimen of Seroquel QHS but then switched to Risperidone and depakote per psych recommendations. On 7/31, she developed increasing dyspnea and ABGs indicated a respiratory acidosis with CO2 retention. Azithromycin was prescribed for anti-inflammation in the setting of COPD.  On 8/8 patient with back to baseline, mentation according to daughter.Pressure ulcers identified in between buttocks.Patient was on and off of oxygen, slowly weaning currently at 1L nc saturating 88-92.  Patient discharge with home health. Seroquel and depakote were discontinued , will continue on risperidal and psychiatry will follow as outpatient. Follow up with pulmonology outpatient.       Goals of Care Treatment Preferences:  Code Status: Full Code      Consults:   Consults (From admission, onward)        Status Ordering Provider     Inpatient consult to Physical Medicine Rehab  Once        Provider:  (Not yet assigned)    Completed KARISHMA CAMP     Inpatient consult to Midline team  Once        Provider:  (Not yet assigned)    Completed KENDALL QUIROZ     Inpatient consult to Physical Medicine Rehab  Once        Provider:  Kendall Quiroz MD    Completed KENDALL QUIROZ     Inpatient consult to Psychiatry  Once        Provider:  (Not yet assigned)    Completed MYLES BETANCUR          * Acute psychosis  Ms. Arcos is a 89 yo that  according to daughter after having a 3-4 weeks ago COPD exacerbation, was started on Prednisone by her son who is a nurse. States  that after a few days she started noticing behavioral changes described as flight of ideas, insomnia, rapid speech, alternating irritability with excitability, grandiose delusions auditory hallucinations, fixation with allegories, numerology and Christian.      Differential includes but not limited to drug induced delirium like from recent steroids use vs manic episode from mood disorder. Unlikely to be 2/2 to infection given normal WBC and lacks of infectious symptoms. UA is also negative. UDS is also negative. TSH is normal, low likely moran for thyroid condition     - Continue to follow Psych recs   - Continue to monitor mood and mental status   - Delirium precautions   - Holding further steroids     Scheduled Medication(s):  - discontinue Depakote 125mg PO BID  - Continue scheduled Risperdal m-tabs (disintegrating) 0.5 mg PO QHS for acute sujata           Acute hypoxemic respiratory failure  Patient with Hypoxic Respiratory failure which is Acute on chronic.  she is not on home oxygen. Supplemental oxygen was provided and noted-  .       Impaired mobility and ADLs  PT/OT recommending SNF  Given the dramatic change on her mentation and not under sedation, patient is cooperative and request physical therapy.   PMR consulted and reviewing chart to authorize in patient rehab    COPD exacerbation  Currently with normal respiratory effort  -Avoid Steroid nebulizer.  -patient has refused nebulization, agrees to do them today  -Albuterol prn  -Wean oxygen   maintan sat between 88-92%      Mood disorder due to a general medical condition  See acute psychosis    Hypertension   Losartan 100 mg QD           Final Active Diagnoses:    Diagnosis Date Noted POA    PRINCIPAL PROBLEM:  Acute psychosis [F23] 07/24/2022 Yes    Blister [T14.8XXA] 08/09/2022 No    Impaired mobility and ADLs [Z74.09, Z78.9] 08/04/2022 Yes    Acute hypoxemic respiratory failure [J96.01] 08/04/2022 Yes    Mood disorder due to a general medical condition [F06.30] 07/24/2022 Yes    COPD exacerbation [J44.1] 07/24/2022 Yes    Hypertension [I10]  Yes      Problems Resolved During this Admission:       Discharged Condition: good    Disposition: Home-Health Care [facility] [place] [place]    Follow Up:   Follow-up Information   "   Dick Graham MD Follow up on 8/11/2022.    Specialty: Internal Medicine  Why: you have a follow-up hospital visit on august 11 at 9:30am. thanks  Contact information:  [address]  [place] [address]  [phone]             Rnezo Jefferson MD. Call.    Specialty: Psychiatry  Why: Dr. Desir office will call you to make a follow up appintment convenient for you. If you do not hear from them by tomorrow, please call and make an appintment. Please bring your discharge paperwork with you to your appiontment.  Contact information:  [address]  [facility] [address]  [phone]             Dr. Gavin Francisco. Schedule an appointment as soon as possible for a visit in 2 day(s).    Why: Dr. Alonso's office will call you to make a follow up appintment convenient for you. If you do not hear from them by tomorrow, please call and make an appintment. Please bring your discharge paperwork with you to your appiontment.  Contact information:  [address] [place] [address]  [phone]                     Patient Instructions:      WALKER FOR HOME USE     Order Specific Question Answer Comments   Type of Walker: Adult (5'4"-6'6")    With wheels? Yes    Height: 5' 3" (1.6 m)    Weight: 80.3 kg (177 lb)    Length of need (1-99 months): 99    Does patient have medical equipment at home? cane, robinson    Please check all that apply: Patient's condition impairs ambulation.      COMMODE FOR HOME USE     Order Specific Question Answer Comments   Type: Standard    Height: 5' 3" (1.6 m)    Weight: 80.3 kg (177 lb)    Does patient have medical equipment at home? cane, straight    Length of need (1-99 months): 99      OXYGEN FOR HOME USE     Order Specific Question Answer Comments   Liter Flow 1    Duration Continuous    Qualifying Test Performed at: Activity    Oxygen saturation at rest 92    Oxygen saturation with activity 84    Oxygen saturation with activity on oxygen 95    Portable mode: pulse " "dose acceptable    Mode: Portable concentrator    Route nasal cannula    Device: home concentrator with portable concentrator    Length of need (in months): 99 mos    Patient condition with qualifying saturation COPD    Height: 5' 3" (1.6 m)    Weight: 80.3 kg (177 lb)    Alternative treatment measures have been tried or considered and deemed clinically ineffective. Yes      Ambulatory referral/consult to Pulmonology   Standing Status: Future   Referral Priority: Routine Referral Type: Consultation   Referral Reason: Specialty Services Required   Requested Specialty: Pulmonary Disease   Number of Visits Requested: 1     Ambulatory referral/consult to Sleep Disorders   Standing Status: Future   Referral Priority: Routine Referral Type: Consultation   Requested Specialty: Sleep Medicine   Number of Visits Requested: 1     Ambulatory referral/consult to Neuropsychology   Standing Status: Future   Referral Priority: Routine Referral Type: Psychiatric   Referral Reason: Specialty Services Required   Number of Visits Requested: 1       Significant Diagnostic Studies: Labs: All labs within the past 24 hours have been reviewed    Pending Diagnostic Studies:     None         Medications:  Reconciled Home Medications:      Medication List      START taking these medications    miconazole 2 % cream  Commonly known as: MICOTIN  Apply topically 2 (two) times daily.     risperiDONE 0.5 MG Tbdl  Commonly known as: RISPERDAL M-TABS  Take 1 tablet (0.5 mg total) by mouth every evening.        CONTINUE taking these medications    albuterol 90 mcg/actuation inhaler  Commonly known as: PROVENTIL/VENTOLIN HFA  Inhale 2 puffs into the lungs 2 (two) times daily as needed for Wheezing or Shortness of Breath.     BEVESPI AEROSPHERE 9-4.8 mcg Hfaa  Generic drug: glycopyrrolate-formoteroL  Inhale 2 puffs into the lungs 2 (two) times daily.     diclofenac sodium 1 % Gel  Commonly known as: VOLTAREN  Apply 2 g topically daily as needed " (Pain).     famotidine 10 MG tablet  Commonly known as: PEPCID  Take 10 mg by mouth 2 (two) times daily.     levothyroxine 50 MCG tablet  Commonly known as: SYNTHROID  Take 50 mcg by mouth before breakfast.     losartan 100 MG tablet  Commonly known as: COZAAR  Take 100 mg by mouth once daily.     magnesium oxide 400 mg (241.3 mg magnesium) tablet  Commonly known as: MAG-OX  Take 400 mg by mouth once daily.     VITAMIN D-3 ORAL  Take 1 capsule by mouth once daily.        STOP taking these medications    hydroCHLOROthiazide 12.5 mg capsule  Commonly known as: MICROZIDE     POTASSIUM ORAL     UNABLE TO FIND            Indwelling Lines/Drains at time of discharge:   Lines/Drains/Airways     None                 Time spent on the discharge of patient: 40 minutes         Gadiel Grajeda MD  Department of Hospital Medicine  [facility] Surg

## 2022-09-02 ENCOUNTER — EXTERNAL HOME HEALTH (OUTPATIENT)
Dept: HOME HEALTH SERVICES | Facility: HOSPITAL | Age: 87
End: 2022-09-02
Payer: MEDICARE

## 2022-11-11 ENCOUNTER — OFFICE VISIT (OUTPATIENT)
Dept: PSYCHIATRY | Facility: CLINIC | Age: 87
End: 2022-11-11
Payer: MEDICARE

## 2022-11-11 VITALS
SYSTOLIC BLOOD PRESSURE: 178 MMHG | DIASTOLIC BLOOD PRESSURE: 95 MMHG | WEIGHT: 171.94 LBS | BODY MASS INDEX: 30.46 KG/M2 | HEART RATE: 87 BPM

## 2022-11-11 DIAGNOSIS — F30.10 MANIC BEHAVIOR: ICD-10-CM

## 2022-11-11 DIAGNOSIS — F06.30 MOOD DISORDER DUE TO A GENERAL MEDICAL CONDITION: Primary | ICD-10-CM

## 2022-11-11 DIAGNOSIS — F41.9 ANXIETY DISORDER, UNSPECIFIED TYPE: ICD-10-CM

## 2022-11-11 PROBLEM — F19.959 STEROID-INDUCED PSYCHOSIS WITH COMPLICATION: Status: RESOLVED | Noted: 2022-07-24 | Resolved: 2022-08-09

## 2022-11-11 PROCEDURE — 99214 OFFICE O/P EST MOD 30 MIN: CPT | Mod: S$GLB,,, | Performed by: PSYCHIATRY & NEUROLOGY

## 2022-11-11 PROCEDURE — 99214 PR OFFICE/OUTPT VISIT, EST, LEVL IV, 30-39 MIN: ICD-10-PCS | Mod: S$GLB,,, | Performed by: PSYCHIATRY & NEUROLOGY

## 2022-11-11 PROCEDURE — 99999 PR PBB SHADOW E&M-EST. PATIENT-LVL II: CPT | Mod: PBBFAC,,, | Performed by: PSYCHIATRY & NEUROLOGY

## 2022-11-11 PROCEDURE — 99999 PR PBB SHADOW E&M-EST. PATIENT-LVL II: ICD-10-PCS | Mod: PBBFAC,,, | Performed by: PSYCHIATRY & NEUROLOGY

## 2022-11-11 RX ORDER — SERTRALINE HYDROCHLORIDE 25 MG/1
25 TABLET, FILM COATED ORAL DAILY
Qty: 90 TABLET | Refills: 2 | Status: SHIPPED | OUTPATIENT
Start: 2022-11-11 | End: 2023-02-08
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
## 2023-01-12 ENCOUNTER — OFFICE VISIT (OUTPATIENT)
Dept: PSYCHIATRY | Facility: CLINIC | Age: 88
End: 2023-01-12
Payer: MEDICARE

## 2023-01-12 VITALS
BODY MASS INDEX: 28.41 KG/M2 | HEART RATE: 111 BPM | DIASTOLIC BLOOD PRESSURE: 65 MMHG | SYSTOLIC BLOOD PRESSURE: 131 MMHG | WEIGHT: 160.38 LBS

## 2023-01-12 DIAGNOSIS — F41.9 ANXIETY DISORDER, UNSPECIFIED TYPE: ICD-10-CM

## 2023-01-12 DIAGNOSIS — F32.1 MAJOR DEPRESSIVE DISORDER, SINGLE EPISODE, MODERATE: Primary | ICD-10-CM

## 2023-01-12 PROCEDURE — 1160F RVW MEDS BY RX/DR IN RCRD: CPT | Mod: CPTII,S$GLB,, | Performed by: PSYCHIATRY & NEUROLOGY

## 2023-01-12 PROCEDURE — 99999 PR PBB SHADOW E&M-EST. PATIENT-LVL III: ICD-10-PCS | Mod: PBBFAC,,, | Performed by: PSYCHIATRY & NEUROLOGY

## 2023-01-12 PROCEDURE — 90833 PR PSYCHOTHERAPY W/PATIENT W/E&M, 30 MIN (ADD ON): ICD-10-PCS | Mod: S$GLB,,, | Performed by: PSYCHIATRY & NEUROLOGY

## 2023-01-12 PROCEDURE — 90833 PSYTX W PT W E/M 30 MIN: CPT | Mod: S$GLB,,, | Performed by: PSYCHIATRY & NEUROLOGY

## 2023-01-12 PROCEDURE — 99999 PR PBB SHADOW E&M-EST. PATIENT-LVL III: CPT | Mod: PBBFAC,,, | Performed by: PSYCHIATRY & NEUROLOGY

## 2023-01-12 PROCEDURE — 1160F PR REVIEW ALL MEDS BY PRESCRIBER/CLIN PHARMACIST DOCUMENTED: ICD-10-PCS | Mod: CPTII,S$GLB,, | Performed by: PSYCHIATRY & NEUROLOGY

## 2023-01-12 PROCEDURE — 1159F MED LIST DOCD IN RCRD: CPT | Mod: CPTII,S$GLB,, | Performed by: PSYCHIATRY & NEUROLOGY

## 2023-01-12 PROCEDURE — 1159F PR MEDICATION LIST DOCUMENTED IN MEDICAL RECORD: ICD-10-PCS | Mod: CPTII,S$GLB,, | Performed by: PSYCHIATRY & NEUROLOGY

## 2023-01-12 PROCEDURE — 99214 OFFICE O/P EST MOD 30 MIN: CPT | Mod: S$GLB,,, | Performed by: PSYCHIATRY & NEUROLOGY

## 2023-01-12 PROCEDURE — 99214 PR OFFICE/OUTPT VISIT, EST, LEVL IV, 30-39 MIN: ICD-10-PCS | Mod: S$GLB,,, | Performed by: PSYCHIATRY & NEUROLOGY
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
## 2023-02-08 ENCOUNTER — DOCUMENTATION ONLY (OUTPATIENT)
Dept: PSYCHIATRY | Facility: CLINIC | Age: 88
End: 2023-02-08
Payer: MEDICARE

## 2023-02-08 RX ORDER — METHYLPHENIDATE HYDROCHLORIDE 10 MG/1
10 TABLET ORAL 2 TIMES DAILY
Qty: 60 TABLET | Refills: 0 | Status: SHIPPED | OUTPATIENT
Start: 2023-02-08 | End: 2023-03-08 | Stop reason: SDUPTHER

## 2023-02-08 NOTE — PROGRESS NOTES
Spoke to dtr Nimco and son in law pharmacist Felecia Francois .   Pt has not repsonded to Zoloft for depression . Spends most time in bed , little ambulation and poor oral intake .    After discussing options of remeron , periactin and methylphenidate , Felecia and I agree with trial of methylphenidate 10 mg bid starting with one dosed today.  Stop sertraline as  ineffective.     KATYA Miller MD

## 2023-02-22 ENCOUNTER — PATIENT MESSAGE (OUTPATIENT)
Dept: PSYCHIATRY | Facility: CLINIC | Age: 88
End: 2023-02-22
Payer: MEDICARE

## 2023-03-01 ENCOUNTER — PATIENT MESSAGE (OUTPATIENT)
Dept: PSYCHIATRY | Facility: CLINIC | Age: 88
End: 2023-03-01
Payer: MEDICARE

## 2023-03-01 ENCOUNTER — OFFICE VISIT (OUTPATIENT)
Dept: PSYCHIATRY | Facility: CLINIC | Age: 88
End: 2023-03-01
Payer: MEDICARE

## 2023-03-01 DIAGNOSIS — F32.1 MAJOR DEPRESSIVE DISORDER, SINGLE EPISODE, MODERATE: Primary | ICD-10-CM

## 2023-03-01 DIAGNOSIS — F41.9 ANXIETY DISORDER, UNSPECIFIED TYPE: ICD-10-CM

## 2023-03-01 PROCEDURE — 99214 PR OFFICE/OUTPT VISIT, EST, LEVL IV, 30-39 MIN: ICD-10-PCS | Mod: 95,,, | Performed by: PSYCHIATRY & NEUROLOGY

## 2023-03-01 PROCEDURE — 90833 PSYTX W PT W E/M 30 MIN: CPT | Mod: 95,,, | Performed by: PSYCHIATRY & NEUROLOGY

## 2023-03-01 PROCEDURE — 90833 PR PSYCHOTHERAPY W/PATIENT W/E&M, 30 MIN (ADD ON): ICD-10-PCS | Mod: 95,,, | Performed by: PSYCHIATRY & NEUROLOGY

## 2023-03-01 PROCEDURE — 99214 OFFICE O/P EST MOD 30 MIN: CPT | Mod: 95,,, | Performed by: PSYCHIATRY & NEUROLOGY
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
## 2023-03-08 RX ORDER — METHYLPHENIDATE HYDROCHLORIDE 10 MG/1
10 TABLET ORAL
Qty: 90 TABLET | Refills: 0 | Status: SHIPPED | OUTPATIENT
Start: 2023-03-08